# Patient Record
Sex: FEMALE | Race: WHITE | NOT HISPANIC OR LATINO | URBAN - METROPOLITAN AREA
[De-identification: names, ages, dates, MRNs, and addresses within clinical notes are randomized per-mention and may not be internally consistent; named-entity substitution may affect disease eponyms.]

---

## 2017-02-10 ENCOUNTER — TRANSCRIBE ORDERS (OUTPATIENT)
Dept: MRI IMAGING | Facility: CLINIC | Age: 67
End: 2017-02-10

## 2017-02-10 DIAGNOSIS — G35 MULTIPLE SCLEROSIS (HCC): Primary | ICD-10-CM

## 2017-02-21 ENCOUNTER — HOSPITAL ENCOUNTER (OUTPATIENT)
Dept: MRI IMAGING | Facility: CLINIC | Age: 67
Discharge: HOME | End: 2017-02-21
Payer: COMMERCIAL

## 2017-02-21 ENCOUNTER — APPOINTMENT (OUTPATIENT)
Dept: MRI IMAGING | Facility: CLINIC | Age: 67
End: 2017-02-21
Payer: COMMERCIAL

## 2017-02-21 DIAGNOSIS — G35 MULTIPLE SCLEROSIS (HCC): ICD-10-CM

## 2017-02-21 PROCEDURE — A9585 GADOBUTROL INJECTION: HCPCS | Performed by: DENTIST

## 2017-02-21 PROCEDURE — 70553 MRI BRAIN STEM W/O & W/DYE: CPT

## 2017-02-21 PROCEDURE — 72156 MRI NECK SPINE W/O & W/DYE: CPT

## 2017-02-21 RX ADMIN — GADOBUTROL 6 ML: 604.72 INJECTION INTRAVENOUS at 11:02

## 2017-03-20 ENCOUNTER — HOSPITAL ENCOUNTER (OUTPATIENT)
Dept: MRI IMAGING | Facility: CLINIC | Age: 67
Discharge: HOME | End: 2017-03-20
Payer: COMMERCIAL

## 2017-03-20 DIAGNOSIS — G35 MULTIPLE SCLEROSIS (HCC): ICD-10-CM

## 2017-03-20 PROCEDURE — A9585 GADOBUTROL INJECTION: HCPCS | Performed by: DENTIST

## 2017-03-20 PROCEDURE — 72157 MRI CHEST SPINE W/O & W/DYE: CPT

## 2017-03-20 RX ADMIN — GADOBUTROL 6 ML: 604.72 INJECTION INTRAVENOUS at 10:23

## 2018-02-28 LAB — HBA1C MFR BLD HPLC: 7.6 %

## 2019-01-25 ENCOUNTER — OFFICE VISIT (OUTPATIENT)
Dept: INTERNAL MEDICINE CLINIC | Facility: CLINIC | Age: 69
End: 2019-01-25
Payer: COMMERCIAL

## 2019-01-25 VITALS
OXYGEN SATURATION: 98 % | HEIGHT: 60 IN | BODY MASS INDEX: 26.31 KG/M2 | WEIGHT: 134 LBS | SYSTOLIC BLOOD PRESSURE: 178 MMHG | RESPIRATION RATE: 18 BRPM | DIASTOLIC BLOOD PRESSURE: 68 MMHG | HEART RATE: 88 BPM

## 2019-01-25 DIAGNOSIS — M81.0 AGE RELATED OSTEOPOROSIS, UNSPECIFIED PATHOLOGICAL FRACTURE PRESENCE: ICD-10-CM

## 2019-01-25 DIAGNOSIS — G35 MULTIPLE SCLEROSIS (HCC): ICD-10-CM

## 2019-01-25 DIAGNOSIS — E03.9 HYPOTHYROIDISM, ACQUIRED: Primary | ICD-10-CM

## 2019-01-25 DIAGNOSIS — E78.2 MIXED HYPERLIPIDEMIA: ICD-10-CM

## 2019-01-25 DIAGNOSIS — F41.9 ANXIETY: ICD-10-CM

## 2019-01-25 DIAGNOSIS — M65.341 TRIGGER RING FINGER OF RIGHT HAND: ICD-10-CM

## 2019-01-25 DIAGNOSIS — Z23 NEED FOR VACCINATION: ICD-10-CM

## 2019-01-25 DIAGNOSIS — Z11.59 NEED FOR HEPATITIS C SCREENING TEST: ICD-10-CM

## 2019-01-25 DIAGNOSIS — E11.9 TYPE 2 DIABETES MELLITUS WITHOUT COMPLICATION, WITHOUT LONG-TERM CURRENT USE OF INSULIN (HCC): ICD-10-CM

## 2019-01-25 PROCEDURE — 1101F PT FALLS ASSESS-DOCD LE1/YR: CPT | Performed by: INTERNAL MEDICINE

## 2019-01-25 PROCEDURE — 99204 OFFICE O/P NEW MOD 45 MIN: CPT | Performed by: INTERNAL MEDICINE

## 2019-01-25 PROCEDURE — 3725F SCREEN DEPRESSION PERFORMED: CPT | Performed by: INTERNAL MEDICINE

## 2019-01-25 PROCEDURE — G0008 ADMIN INFLUENZA VIRUS VAC: HCPCS | Performed by: INTERNAL MEDICINE

## 2019-01-25 PROCEDURE — 90662 IIV NO PRSV INCREASED AG IM: CPT | Performed by: INTERNAL MEDICINE

## 2019-01-25 RX ORDER — LEVOTHYROXINE SODIUM 0.03 MG/1
25 TABLET ORAL DAILY
Refills: 2 | COMMUNITY
Start: 2019-01-15 | End: 2019-04-15 | Stop reason: SDUPTHER

## 2019-01-25 RX ORDER — BUSPIRONE HYDROCHLORIDE 10 MG/1
10 TABLET ORAL 2 TIMES DAILY PRN
Qty: 60 TABLET | Refills: 3 | Status: SHIPPED | OUTPATIENT
Start: 2019-01-25 | End: 2020-08-31 | Stop reason: SDUPTHER

## 2019-01-25 RX ORDER — IBANDRONATE SODIUM 150 MG/1
TABLET, FILM COATED ORAL
COMMUNITY
Start: 2019-01-25 | End: 2019-03-08 | Stop reason: SDUPTHER

## 2019-01-25 RX ORDER — ROSUVASTATIN CALCIUM 10 MG/1
10 TABLET, COATED ORAL
Refills: 1 | COMMUNITY
Start: 2018-12-08 | End: 2019-07-17 | Stop reason: SDUPTHER

## 2019-01-25 RX ORDER — BUSPIRONE HYDROCHLORIDE 10 MG/1
TABLET ORAL
COMMUNITY
End: 2019-01-25 | Stop reason: SDUPTHER

## 2019-01-25 RX ORDER — METHOCARBAMOL 500 MG/1
500 TABLET, FILM COATED ORAL 3 TIMES DAILY PRN
COMMUNITY
End: 2021-09-22 | Stop reason: ALTCHOICE

## 2019-01-25 NOTE — PROGRESS NOTES
Assessment/Plan:     Chronic problems appear stable at this point  She does need blood work to make sure  Ordered those tests  For her trigger finger, we can refer her to Ortho  Continue follow-up with her other specialists  BMI Counseling: Body mass index is 26 17 kg/m²  Discussed the patient's BMI with her  The BMI is above average  BMI counseling and education was provided to the patient  Nutrition recommendations include moderation in carbohydrate intake  Return in about 2 weeks (around 2/8/2019)  No problem-specific Assessment & Plan notes found for this encounter  Diagnoses and all orders for this visit:    Hypothyroidism, acquired  -     TSH, 3rd generation; Future  -     T4, free; Future    Type 2 diabetes mellitus without complication, without long-term current use of insulin (HCC)  -     CBC and differential; Future  -     Comprehensive metabolic panel; Future  -     Hemoglobin A1C; Future  -     Lipid panel; Future  -     Microalbumin / creatinine urine ratio; Future    Mixed hyperlipidemia    Multiple sclerosis (HCC)    Age related osteoporosis, unspecified pathological fracture presence    Need for hepatitis C screening test  -     Hepatitis C antibody; Future    Trigger ring finger of right hand  -     Ambulatory referral to Orthopedic Surgery; Future    Anxiety  -     busPIRone (BUSPAR) 10 mg tablet; Take 1 tablet (10 mg total) by mouth 2 (two) times a day as needed (anxiety)    Need for vaccination  -     influenza vaccine, 3847-1305, high-dose, PF 0 5 mL, for patients 65 yr+ (FLUZONE HIGH-DOSE)    Other orders  -     metFORMIN (GLUCOPHAGE) 1000 MG tablet;   -     levothyroxine 25 mcg tablet; Take 25 mcg by mouth daily  -     ibandronate (BONIVA) 150 MG tablet;   -     rosuvastatin (CRESTOR) 10 MG tablet; Take 10 mg by mouth daily at bedtime  -     methocarbamol (ROBAXIN) 500 mg tablet;  Take 500 mg by mouth Three times daily as needed  -     Discontinue: busPIRone (BUSPAR) 10 mg tablet; Take by mouth  -     DOCOSAHEXAENOIC ACID PO; Take 1 tablet by mouth          Subjective:      Patient ID: Tiburcio Owens is a 79 y o  female  Patient is a 56-year-old white female who comes in today for 1st time visit  She used to see me several years ago at the old office  Then there were some insurance issues  She does have multiple medical problems which are fairly well controlled at this point  She does have multiple sclerosis but is not on any medicine at this point  She reports she had been on steroids for a long time for this  And because of that, she has osteoporosis and is on medication for that  She is diabetic and recently was started on metformin because diet was no longer controlling it  She brought in some labs which show fairly good control but she is due for repeat labs  She also has hypothyroidism and is on medication for that  She also has high cholesterol which appears to be controlled on the blood work  She is taking the medicines as directed  She also reports history of anxiety for which she will actually only take an occasional BuSpar  That has worked for her for many years but she does need a refill  More recently she has been having worsening pain and discomfort from trigger fingers, 1 in each hand  She tried conservative measures but it is not helping and she would like to see orthopedics at this point to consider a steroid injection          ALLERGIES:  Allergies   Allergen Reactions    Bee Venom Shortness Of Breath and Swelling    Latex Rash       CURRENT MEDICATIONS:    Current Outpatient Prescriptions:     busPIRone (BUSPAR) 10 mg tablet, Take 1 tablet (10 mg total) by mouth 2 (two) times a day as needed (anxiety), Disp: 60 tablet, Rfl: 3    ibandronate (BONIVA) 150 MG tablet, , Disp: , Rfl:     levothyroxine 25 mcg tablet, Take 25 mcg by mouth daily, Disp: , Rfl: 2    metFORMIN (GLUCOPHAGE) 1000 MG tablet, , Disp: , Rfl:     methocarbamol (ROBAXIN) 500 mg tablet, Take 500 mg by mouth Three times daily as needed, Disp: , Rfl:     rosuvastatin (CRESTOR) 10 MG tablet, Take 10 mg by mouth daily at bedtime, Disp: , Rfl: 1    DOCOSAHEXAENOIC ACID PO, Take 1 tablet by mouth, Disp: , Rfl:     ACTIVE PROBLEM LIST:  Patient Active Problem List   Diagnosis    Basal cell carcinoma of back    Multiple sclerosis ([address] )    Hypothyroidism, acquired    Mixed hyperlipidemia    Age related osteoporosis       PAST MEDICAL HISTORY:  No past medical history on file  PAST SURGICAL HISTORY:  Past Surgical History:   Procedure Laterality Date    MYRINGOTOMY W/ TUBES         FAMILY HISTORY:  Family History   Problem Relation Age of Onset    Thyroid cancer Mother     Prostate cancer Father        SOCIAL HISTORY:  Social History     Social History    Marital status: /Civil Union     Spouse name: N/A    Number of children: N/A    Years of education: N/A     Occupational History    Not on file  Social History Main Topics    Smoking status: Never Smoker    Smokeless tobacco: Never Used    Alcohol use Not on file    Drug use: Unknown    Sexual activity: Not on file     Other Topics Concern    Not on file     Social History Narrative    No narrative on file       Review of Systems   Constitutional: Negative for fever  HENT: Negative for congestion and sore throat  Eyes: Negative for visual disturbance  Respiratory: Negative for shortness of breath  Cardiovascular: Negative for chest pain  Gastrointestinal: Negative for abdominal pain  Genitourinary: Negative for difficulty urinating  Musculoskeletal: Negative for back pain  Skin: Negative for rash  Neurological: Negative for headaches  Hematological: Does not bruise/bleed easily  Psychiatric/Behavioral: Negative for dysphoric mood           Objective:  Vitals:    01/25/19 1434   BP: (!) 178/68   BP Location: Left arm   Patient Position: Sitting   Cuff Size: Adult   Pulse: 88   Resp: 18 SpO2: 98%   Weight: 60 8 kg (134 lb)   Height: 5' (1 524 m)     Body mass index is 26 17 kg/m²  Physical Exam   Constitutional: She is oriented to person, place, and time  She appears well-developed and well-nourished  HENT:   Right Ear: External ear normal    Left Ear: External ear normal    Nose: Nose normal    Mouth/Throat: Oropharynx is clear and moist    Eyes: Pupils are equal, round, and reactive to light  Conjunctivae and EOM are normal    Neck: Normal range of motion  Neck supple  Carotid bruit is not present  Cardiovascular: Normal rate, regular rhythm, normal heart sounds and intact distal pulses  Pulmonary/Chest: Effort normal and breath sounds normal    Abdominal: Soft  Bowel sounds are normal    Musculoskeletal: Normal range of motion  She exhibits no edema  Neurological: She is alert and oriented to person, place, and time  She has normal reflexes  Skin: Skin is warm and dry  No rash noted  Psychiatric: She has a normal mood and affect  Nursing note and vitals reviewed  RESULTS:    No results found for this or any previous visit (from the past 1008 hour(s))  This note was created with voice recognition software  Phonic, grammatical and spelling errors may be present within the note as a result

## 2019-01-28 LAB — HBA1C MFR BLD HPLC: 7.1 %

## 2019-02-22 ENCOUNTER — OFFICE VISIT (OUTPATIENT)
Dept: INTERNAL MEDICINE CLINIC | Facility: CLINIC | Age: 69
End: 2019-02-22
Payer: COMMERCIAL

## 2019-02-22 VITALS
HEART RATE: 84 BPM | OXYGEN SATURATION: 97 % | HEIGHT: 60 IN | SYSTOLIC BLOOD PRESSURE: 146 MMHG | BODY MASS INDEX: 26.5 KG/M2 | DIASTOLIC BLOOD PRESSURE: 70 MMHG | RESPIRATION RATE: 18 BRPM | WEIGHT: 135 LBS

## 2019-02-22 DIAGNOSIS — E03.9 HYPOTHYROIDISM, ACQUIRED: ICD-10-CM

## 2019-02-22 DIAGNOSIS — E11.9 TYPE 2 DIABETES MELLITUS WITHOUT COMPLICATION, WITHOUT LONG-TERM CURRENT USE OF INSULIN (HCC): Primary | ICD-10-CM

## 2019-02-22 DIAGNOSIS — Z12.11 SCREENING FOR MALIGNANT NEOPLASM OF COLON: ICD-10-CM

## 2019-02-22 DIAGNOSIS — E78.2 MIXED HYPERLIPIDEMIA: ICD-10-CM

## 2019-02-22 PROCEDURE — 3008F BODY MASS INDEX DOCD: CPT | Performed by: INTERNAL MEDICINE

## 2019-02-22 PROCEDURE — 1160F RVW MEDS BY RX/DR IN RCRD: CPT | Performed by: INTERNAL MEDICINE

## 2019-02-22 PROCEDURE — 99214 OFFICE O/P EST MOD 30 MIN: CPT | Performed by: INTERNAL MEDICINE

## 2019-02-22 PROCEDURE — 1036F TOBACCO NON-USER: CPT | Performed by: INTERNAL MEDICINE

## 2019-02-22 NOTE — PROGRESS NOTES
Assessment/Plan:     Chronic problems are stable  Continue present medications  Continue diet and exercise  Ordered labs for next visit  Return in about 4 months (around 6/22/2019)  No problem-specific Assessment & Plan notes found for this encounter  Diagnoses and all orders for this visit:    Type 2 diabetes mellitus without complication, without long-term current use of insulin (HCC)  -     Comprehensive metabolic panel; Future  -     CBC and differential; Future  -     Hemoglobin A1C; Future  -     Lipid panel; Future  -     Microalbumin / creatinine urine ratio; Future    Hypothyroidism, acquired  -     T4, free; Future  -     TSH, 3rd generation; Future    Mixed hyperlipidemia    Screening for malignant neoplasm of colon  -     Cologuard; Future          Subjective:      Patient ID: Sosa Palmer is a 79 y o  female  Patient comes in today for follow-up  Her blood work shows her sugars are better controlled  Slightly improved from the summer  Cholesterol is controlled  TSH is slightly high but appears better than prior readings recently  She has no changes in her medicines  Reports no new complaints        ALLERGIES:  Allergies   Allergen Reactions    Bee Venom Shortness Of Breath and Swelling    Latex Rash       CURRENT MEDICATIONS:    Current Outpatient Medications:     busPIRone (BUSPAR) 10 mg tablet, Take 1 tablet (10 mg total) by mouth 2 (two) times a day as needed (anxiety), Disp: 60 tablet, Rfl: 3    DOCOSAHEXAENOIC ACID PO, Take 1 tablet by mouth, Disp: , Rfl:     ibandronate (BONIVA) 150 MG tablet, , Disp: , Rfl:     levothyroxine 25 mcg tablet, Take 25 mcg by mouth daily, Disp: , Rfl: 2    metFORMIN (GLUCOPHAGE) 1000 MG tablet, , Disp: , Rfl:     methocarbamol (ROBAXIN) 500 mg tablet, Take 500 mg by mouth Three times daily as needed, Disp: , Rfl:     rosuvastatin (CRESTOR) 10 MG tablet, Take 10 mg by mouth daily at bedtime, Disp: , Rfl: 1    ACTIVE PROBLEM LIST:  Patient Active Problem List   Diagnosis    Basal cell carcinoma of back    Multiple sclerosis ([facility] [address]    Hypothyroidism, acquired    Mixed hyperlipidemia    Age related osteoporosis       PAST MEDICAL HISTORY:  History reviewed  No pertinent past medical history      PAST SURGICAL HISTORY:  Past Surgical History:   Procedure Laterality Date    MYRINGOTOMY W/ TUBES         FAMILY HISTORY:  Family History   Problem Relation Age of Onset    Thyroid cancer Mother     Prostate cancer Father        SOCIAL HISTORY:  Social History     Socioeconomic History    Marital status: /Civil Union     Spouse name: Not on file    Number of children: Not on file    Years of education: Not on file    Highest education level: Not on file   Occupational History    Not on file   Social Needs    Financial resource strain: Not on file    Food insecurity:     Worry: Not on file     Inability: Not on file    Transportation needs:     Medical: Not on file     Non-medical: Not on file   Tobacco Use    Smoking status: Never Smoker    Smokeless tobacco: Never Used   Substance and Sexual Activity    Alcohol use: Not on file    Drug use: Not on file    Sexual activity: Not on file   Lifestyle    Physical activity:     Days per week: Not on file     Minutes per session: Not on file    Stress: Not on file   Relationships    Social connections:     Talks on phone: Not on file     Gets together: Not on file     Attends Restoration service: Not on file     Active member of club or organization: Not on file     Attends meetings of clubs or organizations: Not on file     Relationship status: Not on file    Intimate partner violence:     Fear of current or ex partner: Not on file     Emotionally abused: Not on file     Physically abused: Not on file     Forced sexual activity: Not on file   Other Topics Concern    Not on file   Social History Narrative    Not on file       Review of Systems   Respiratory: Negative for shortness of breath  Cardiovascular: Negative for chest pain  Gastrointestinal: Negative for abdominal pain  Objective:  Vitals:    02/22/19 1606   BP: 146/70   BP Location: Left arm   Patient Position: Sitting   Cuff Size: Adult   Pulse: 84   Resp: 18   SpO2: 97%   Weight: 61 2 kg (135 lb)   Height: 5' (1 524 m)     Body mass index is 26 37 kg/m²  Physical Exam   Constitutional: She is oriented to person, place, and time  She appears well-developed and well-nourished  Cardiovascular: Normal rate, regular rhythm and normal heart sounds  Pulmonary/Chest: Effort normal and breath sounds normal    Abdominal: Soft  There is no tenderness  Neurological: She is alert and oriented to person, place, and time  Nursing note and vitals reviewed  RESULTS:    Recent Results (from the past 1008 hour(s))   Hemoglobin A1C    Collection Time: 01/28/19 12:00 AM   Result Value Ref Range    Hemoglobin A1C 7 1        This note was created with voice recognition software  Phonic, grammatical and spelling errors may be present within the note as a result

## 2019-02-25 DIAGNOSIS — E11.9 TYPE 2 DIABETES MELLITUS WITHOUT COMPLICATION, WITHOUT LONG-TERM CURRENT USE OF INSULIN (HCC): Primary | ICD-10-CM

## 2019-02-25 NOTE — TELEPHONE ENCOUNTER
I can but screening for Vitamin D is not covered by Medicare and test is expensive  This is why we just tell people to take 2000 units of Vitamin D3 OTC daily

## 2019-03-08 ENCOUNTER — TELEPHONE (OUTPATIENT)
Dept: INTERNAL MEDICINE CLINIC | Facility: CLINIC | Age: 69
End: 2019-03-08

## 2019-03-08 DIAGNOSIS — M81.0 AGE-RELATED OSTEOPOROSIS WITHOUT CURRENT PATHOLOGICAL FRACTURE: Primary | ICD-10-CM

## 2019-03-08 RX ORDER — IBANDRONATE SODIUM 150 MG/1
150 TABLET, FILM COATED ORAL
Qty: 4 TABLET | Refills: 3 | Status: SHIPPED | OUTPATIENT
Start: 2019-03-08 | End: 2020-01-20 | Stop reason: SDUPTHER

## 2019-03-08 NOTE — TELEPHONE ENCOUNTER
Ibandronate (BONIVA) 150 MG tablet    Per note from Dr Heather Walker this Medication has to be filled by patient's PCP or Rheumatologist     Please advise    Andrew Luz

## 2019-04-03 ENCOUNTER — TELEPHONE (OUTPATIENT)
Dept: INTERNAL MEDICINE CLINIC | Facility: CLINIC | Age: 69
End: 2019-04-03

## 2019-04-15 DIAGNOSIS — E03.9 HYPOTHYROIDISM, ACQUIRED: Primary | ICD-10-CM

## 2019-04-15 RX ORDER — LEVOTHYROXINE SODIUM 0.03 MG/1
25 TABLET ORAL DAILY
Qty: 30 TABLET | Refills: 5 | Status: SHIPPED | OUTPATIENT
Start: 2019-04-15 | End: 2019-10-03 | Stop reason: SDUPTHER

## 2019-04-22 DIAGNOSIS — E55.9 VITAMIN D DEFICIENCY: Primary | ICD-10-CM

## 2019-04-30 ENCOUNTER — TELEPHONE (OUTPATIENT)
Dept: INTERNAL MEDICINE CLINIC | Facility: CLINIC | Age: 69
End: 2019-04-30

## 2019-06-19 LAB
CREAT ?TM UR-SCNC: 59.1 UMOL/L
EXT MICROALBUMIN URINE RANDOM: 2.2
HBA1C MFR BLD HPLC: 7.3 %
MICROALBUMIN/CREAT UR: 37.2 MG/G{CREAT}

## 2019-06-19 PROCEDURE — 3060F POS MICROALBUMINURIA REV: CPT | Performed by: INTERNAL MEDICINE

## 2019-06-25 ENCOUNTER — TRANSCRIBE ORDERS (OUTPATIENT)
Dept: ADMINISTRATIVE | Facility: HOSPITAL | Age: 69
End: 2019-06-25

## 2019-06-25 DIAGNOSIS — G35 MS (MULTIPLE SCLEROSIS) (HCC): Primary | ICD-10-CM

## 2019-07-01 ENCOUNTER — OFFICE VISIT (OUTPATIENT)
Dept: INTERNAL MEDICINE CLINIC | Facility: CLINIC | Age: 69
End: 2019-07-01
Payer: COMMERCIAL

## 2019-07-01 VITALS
OXYGEN SATURATION: 98 % | BODY MASS INDEX: 28.07 KG/M2 | HEIGHT: 60 IN | WEIGHT: 143 LBS | RESPIRATION RATE: 16 BRPM | SYSTOLIC BLOOD PRESSURE: 158 MMHG | HEART RATE: 87 BPM | DIASTOLIC BLOOD PRESSURE: 80 MMHG

## 2019-07-01 DIAGNOSIS — E78.2 MIXED HYPERLIPIDEMIA: ICD-10-CM

## 2019-07-01 DIAGNOSIS — E11.9 TYPE 2 DIABETES MELLITUS WITHOUT COMPLICATION, WITHOUT LONG-TERM CURRENT USE OF INSULIN (HCC): Primary | ICD-10-CM

## 2019-07-01 DIAGNOSIS — Z23 NEED FOR PNEUMOCOCCAL VACCINATION: ICD-10-CM

## 2019-07-01 DIAGNOSIS — E03.9 HYPOTHYROIDISM, ACQUIRED: ICD-10-CM

## 2019-07-01 PROCEDURE — 4040F PNEUMOC VAC/ADMIN/RCVD: CPT | Performed by: INTERNAL MEDICINE

## 2019-07-01 PROCEDURE — 3008F BODY MASS INDEX DOCD: CPT | Performed by: INTERNAL MEDICINE

## 2019-07-01 PROCEDURE — 1036F TOBACCO NON-USER: CPT | Performed by: INTERNAL MEDICINE

## 2019-07-01 PROCEDURE — 90732 PPSV23 VACC 2 YRS+ SUBQ/IM: CPT | Performed by: INTERNAL MEDICINE

## 2019-07-01 PROCEDURE — G0009 ADMIN PNEUMOCOCCAL VACCINE: HCPCS | Performed by: INTERNAL MEDICINE

## 2019-07-01 PROCEDURE — 1160F RVW MEDS BY RX/DR IN RCRD: CPT | Performed by: INTERNAL MEDICINE

## 2019-07-01 PROCEDURE — 99214 OFFICE O/P EST MOD 30 MIN: CPT | Performed by: INTERNAL MEDICINE

## 2019-07-01 NOTE — PROGRESS NOTES
Assessment/Plan:     Chronic problems are stable  Suggested she start taking her cholesterol medicine in the morning because she keeps forgetting it  Reinforced diet and exercise because of the increase in sugars  No medication changes otherwise  Ordered blood work for next visit  BMI Counseling: Body mass index is 27 93 kg/m²  Discussed the patient's BMI with her  The BMI is above average  BMI counseling and education was provided to the patient  Nutrition recommendations include moderation in carbohydrate intake  Return in about 4 months (around 11/1/2019) for Regular visit and Medicare Wellness  No problem-specific Assessment & Plan notes found for this encounter  Diagnoses and all orders for this visit:    Type 2 diabetes mellitus without complication, without long-term current use of insulin (HCC)  -     Comprehensive metabolic panel; Future  -     CBC and differential; Future  -     Hemoglobin A1C; Future  -     Lipid panel; Future    Hypothyroidism, acquired  -     T4, free; Future  -     TSH, 3rd generation; Future    Mixed hyperlipidemia    Need for pneumococcal vaccination  -     PNEUMOCOCCAL POLYSACCHARIDE VACCINE 23-VALENT =>3YO SQ IM          Subjective:      Patient ID: Ned Mckenzie is a 79 y o  female  Patient comes in today for routine follow-up  Her blood work shows her sugars have gotten a little worse  She has been taking her medicine but admits she could do better with her diet  Thyroid levels are controlled  Blood pressure is controlled  Cholesterol was also markedly elevated  She admits she frequently forgets to take the medicine at night  Denies any new complaints today  No further additions to her history        ALLERGIES:  Allergies   Allergen Reactions    Bee Venom Shortness Of Breath and Swelling    Latex Rash       CURRENT MEDICATIONS:    Current Outpatient Medications:     busPIRone (BUSPAR) 10 mg tablet, Take 1 tablet (10 mg total) by mouth 2 (two) times a day as needed (anxiety), Disp: 60 tablet, Rfl: 3    DOCOSAHEXAENOIC ACID PO, Take 1 tablet by mouth, Disp: , Rfl:     ibandronate (BONIVA) 150 MG tablet, Take 1 tablet (150 mg total) by mouth every 30 (thirty) days, Disp: 4 tablet, Rfl: 3    levothyroxine 25 mcg tablet, Take 1 tablet (25 mcg total) by mouth daily, Disp: 30 tablet, Rfl: 5    metFORMIN (GLUCOPHAGE) 1000 MG tablet, Take 1 tablet (1,000 mg total) by mouth daily with breakfast, Disp: 30 tablet, Rfl: 5    methocarbamol (ROBAXIN) 500 mg tablet, Take 500 mg by mouth Three times daily as needed, Disp: , Rfl:     rosuvastatin (CRESTOR) 10 MG tablet, Take 10 mg by mouth daily at bedtime, Disp: , Rfl: 1    ACTIVE PROBLEM LIST:  Patient Active Problem List   Diagnosis    Basal cell carcinoma of back    Multiple sclerosis ([facility] [address]    Hypothyroidism, acquired    Mixed hyperlipidemia    Age related osteoporosis    Type 2 diabetes mellitus without complication, without long-term current use of insulin (HCC)       PAST MEDICAL HISTORY:  History reviewed  No pertinent past medical history      PAST SURGICAL HISTORY:  Past Surgical History:   Procedure Laterality Date    MYRINGOTOMY W/ TUBES         FAMILY HISTORY:  Family History   Problem Relation Age of Onset    Thyroid cancer Mother     Prostate cancer Father        SOCIAL HISTORY:  Social History     Socioeconomic History    Marital status: /Civil Union     Spouse name: Not on file    Number of children: Not on file    Years of education: Not on file    Highest education level: Not on file   Occupational History    Not on file   Social Needs    Financial resource strain: Not on file    Food insecurity:     Worry: Not on file     Inability: Not on file    Transportation needs:     Medical: Not on file     Non-medical: Not on file   Tobacco Use    Smoking status: Never Smoker    Smokeless tobacco: Never Used   Substance and Sexual Activity    Alcohol use: Not on file    Drug use: Not on file    Sexual activity: Not on file   Lifestyle    Physical activity:     Days per week: Not on file     Minutes per session: Not on file    Stress: Not on file   Relationships    Social connections:     Talks on phone: Not on file     Gets together: Not on file     Attends Yarsanism service: Not on file     Active member of club or organization: Not on file     Attends meetings of clubs or organizations: Not on file     Relationship status: Not on file    Intimate partner violence:     Fear of current or ex partner: Not on file     Emotionally abused: Not on file     Physically abused: Not on file     Forced sexual activity: Not on file   Other Topics Concern    Not on file   Social History Narrative    Not on file       Review of Systems   Respiratory: Negative for shortness of breath  Cardiovascular: Negative for chest pain  Gastrointestinal: Negative for abdominal pain  Objective:  Vitals:    07/01/19 1538   BP: 158/80   BP Location: Left arm   Patient Position: Sitting   Cuff Size: Standard   Pulse: 87   Resp: 16   SpO2: 98%   Weight: 64 9 kg (143 lb)   Height: 5' (1 524 m)     Body mass index is 27 93 kg/m²  Physical Exam   Constitutional: She is oriented to person, place, and time  She appears well-developed and well-nourished  Cardiovascular: Normal rate, regular rhythm and normal heart sounds  Pulmonary/Chest: Effort normal and breath sounds normal    Abdominal: Soft  There is no tenderness  Neurological: She is alert and oriented to person, place, and time  Nursing note and vitals reviewed  RESULTS:    Recent Results (from the past 1008 hour(s))   Hemoglobin A1C    Collection Time: 06/19/19 12:00 AM   Result Value Ref Range    Hemoglobin A1C 7 3    Microalbumin / creatinine urine ratio    Collection Time: 06/19/19 12:00 AM   Result Value Ref Range    EXT Creatinine Urine 59 1     Microalbum  ,U,Random 2 2     EXTERNAL Microalb/Creat Ratio 37 2        This note was created with voice recognition software  Phonic, grammatical and spelling errors may be present within the note as a result

## 2019-07-01 NOTE — PROGRESS NOTES
Patient's shoes and socks removed  Right Foot/Ankle   Right Foot Inspection  Skin Exam: skin normal and skin intact no dry skin, no warmth, no callus, no erythema, no maceration, no abnormal color, no pre-ulcer, no ulcer and no callus                          Toe Exam: ROM and strength within normal limits  Sensory   Vibration: intact  Proprioception: intact   Monofilament testing: intact  Vascular    The right DP pulse is 2+  Right Toe  - Comprehensive Exam  Ecchymosis: none  Arch: normal  Swelling: none   Tenderness: none         Left Foot/Ankle  Left Foot Inspection  Skin Exam: skin normal and skin intactno dry skin, no warmth, no erythema, no maceration, normal color, no pre-ulcer, no ulcer and no callus                         Toe Exam: ROM and strength within normal limits                   Sensory   Vibration: intact  Proprioception: intact  Monofilament: intact  Vascular    The left DP pulse is 2+  Left Toe  - Comprehensive Exam  Ecchymosis: none  Arch: normal  Swelling: none   Tenderness: none       Assign Risk Category:  No deformity present; No loss of protective sensation;  No weak pulses       Risk: 0

## 2019-07-17 DIAGNOSIS — E78.2 MIXED HYPERLIPIDEMIA: Primary | ICD-10-CM

## 2019-07-17 RX ORDER — ROSUVASTATIN CALCIUM 10 MG/1
10 TABLET, COATED ORAL
Qty: 30 TABLET | Refills: 5 | Status: SHIPPED | OUTPATIENT
Start: 2019-07-17 | End: 2020-01-20 | Stop reason: SDUPTHER

## 2019-08-19 DIAGNOSIS — E11.9 TYPE 2 DIABETES MELLITUS WITHOUT COMPLICATION, WITHOUT LONG-TERM CURRENT USE OF INSULIN (HCC): ICD-10-CM

## 2019-08-26 ENCOUNTER — APPOINTMENT (OUTPATIENT)
Dept: LAB | Facility: CLINIC | Age: 69
End: 2019-08-26
Payer: COMMERCIAL

## 2019-08-26 ENCOUNTER — TRANSCRIBE ORDERS (OUTPATIENT)
Dept: ADMINISTRATIVE | Facility: HOSPITAL | Age: 69
End: 2019-08-26

## 2019-08-26 DIAGNOSIS — G35 MULTIPLE SCLEROSIS (HCC): Primary | ICD-10-CM

## 2019-08-26 DIAGNOSIS — G35 MULTIPLE SCLEROSIS (HCC): ICD-10-CM

## 2019-08-26 LAB
BUN SERPL-MCNC: 13 MG/DL (ref 5–25)
CREAT SERPL-MCNC: 0.76 MG/DL (ref 0.6–1.3)
GFR SERPL CREATININE-BSD FRML MDRD: 82 ML/MIN/1.73SQ M

## 2019-08-26 PROCEDURE — 36415 COLL VENOUS BLD VENIPUNCTURE: CPT

## 2019-08-26 PROCEDURE — 82565 ASSAY OF CREATININE: CPT

## 2019-08-26 PROCEDURE — 84520 ASSAY OF UREA NITROGEN: CPT

## 2019-09-11 ENCOUNTER — HOSPITAL ENCOUNTER (OUTPATIENT)
Dept: MRI IMAGING | Facility: CLINIC | Age: 69
Discharge: HOME | End: 2019-09-11
Payer: COMMERCIAL

## 2019-09-11 ENCOUNTER — APPOINTMENT (OUTPATIENT)
Dept: MRI IMAGING | Facility: CLINIC | Age: 69
End: 2019-09-11
Payer: COMMERCIAL

## 2019-09-11 DIAGNOSIS — G35 MS (MULTIPLE SCLEROSIS) (HCC): ICD-10-CM

## 2019-09-11 PROCEDURE — 70553 MRI BRAIN STEM W/O & W/DYE: CPT

## 2019-09-11 PROCEDURE — A9585 GADOBUTROL INJECTION: HCPCS | Performed by: PSYCHIATRY & NEUROLOGY

## 2019-09-11 RX ADMIN — GADOBUTROL 7 ML: 604.72 INJECTION INTRAVENOUS at 10:32

## 2019-10-03 DIAGNOSIS — E03.9 HYPOTHYROIDISM, ACQUIRED: ICD-10-CM

## 2019-10-03 RX ORDER — LEVOTHYROXINE SODIUM 0.03 MG/1
TABLET ORAL
Qty: 30 TABLET | Refills: 2 | Status: SHIPPED | OUTPATIENT
Start: 2019-10-03 | End: 2019-12-28 | Stop reason: SDUPTHER

## 2019-10-15 ENCOUNTER — OFFICE VISIT (OUTPATIENT)
Dept: INTERNAL MEDICINE CLINIC | Facility: CLINIC | Age: 69
End: 2019-10-15
Payer: COMMERCIAL

## 2019-10-15 VITALS
SYSTOLIC BLOOD PRESSURE: 136 MMHG | DIASTOLIC BLOOD PRESSURE: 72 MMHG | HEIGHT: 60 IN | WEIGHT: 243 LBS | BODY MASS INDEX: 47.71 KG/M2 | OXYGEN SATURATION: 98 % | HEART RATE: 79 BPM

## 2019-10-15 DIAGNOSIS — H66.3X3 CHRONIC SUPPURATIVE OTITIS MEDIA OF BOTH EARS, UNSPECIFIED OTITIS MEDIA LOCATION: ICD-10-CM

## 2019-10-15 DIAGNOSIS — E11.9 TYPE 2 DIABETES MELLITUS WITHOUT COMPLICATION, WITHOUT LONG-TERM CURRENT USE OF INSULIN (HCC): ICD-10-CM

## 2019-10-15 DIAGNOSIS — Z01.818 PRE-OP EXAMINATION: Primary | ICD-10-CM

## 2019-10-15 PROCEDURE — 3008F BODY MASS INDEX DOCD: CPT | Performed by: INTERNAL MEDICINE

## 2019-10-15 PROCEDURE — 99214 OFFICE O/P EST MOD 30 MIN: CPT | Performed by: INTERNAL MEDICINE

## 2019-10-15 PROCEDURE — 93000 ELECTROCARDIOGRAM COMPLETE: CPT | Performed by: INTERNAL MEDICINE

## 2019-10-15 PROCEDURE — 1101F PT FALLS ASSESS-DOCD LE1/YR: CPT | Performed by: INTERNAL MEDICINE

## 2019-10-15 RX ORDER — FLUTICASONE PROPIONATE 50 MCG
SPRAY, SUSPENSION (ML) NASAL
COMMUNITY
Start: 2019-10-08

## 2019-10-15 RX ORDER — MELATONIN
1000 DAILY
COMMUNITY

## 2019-10-15 RX ORDER — MELOXICAM 5 MG/1
5 CAPSULE ORAL
COMMUNITY
End: 2021-05-20 | Stop reason: SDUPTHER

## 2019-10-15 NOTE — PROGRESS NOTES
Assessment/Plan:     No significant abnormalities noted  Patient is considered cleared for the planned procedure with no further workup indicated  Her EKG was within acceptable limits  She will take her medications as per anesthesia protocol  Encouraged her to maintain tight control of her sugars perioperatively to reduce the risk of infection  Quality Measures:       Return for Next scheduled follow up, please add wellness to that appt  No problem-specific Assessment & Plan notes found for this encounter  Diagnoses and all orders for this visit:    Pre-op examination  -     POCT ECG    Chronic suppurative otitis media of both ears, unspecified otitis media location    Type 2 diabetes mellitus without complication, without long-term current use of insulin ([facility])  -     Cancel: POCT hemoglobin A1c    Other orders  -     fluticasone (FLONASE) 50 mcg/act nasal spray  -     Meloxicam 5 MG CAPS; Take 5 mg by mouth  -     cholecalciferol (VITAMIN D3) 1,000 units tablet; Take 1,000 Units by mouth daily          Subjective:      Patient ID: Brianna Rdz is a 79 y o  female  Patient comes in today for preop evaluation for placement of myringotomy tubes for recurrence chronic otitis media  She reports she noticed ear symptoms again and decreased hearing that just would not improve  Her chronic medical problems are stable  Sugars have been under control  She has no history of heart disease  No lung problems        ALLERGIES:  Allergies   Allergen Reactions    Bee Venom Shortness Of Breath and Swelling    Latex Rash       CURRENT MEDICATIONS:    Current Outpatient Medications:     busPIRone (BUSPAR) 10 mg tablet, Take 1 tablet (10 mg total) by mouth 2 (two) times a day as needed (anxiety), Disp: 60 tablet, Rfl: 3    cholecalciferol (VITAMIN D3) 1,000 units tablet, Take 1,000 Units by mouth daily, Disp: , Rfl:     fluticasone (FLONASE) 50 mcg/act nasal spray, , Disp: , Rfl:     ibandronate (BONIVA) 150 MG tablet, Take 1 tablet (150 mg total) by mouth every 30 (thirty) days, Disp: 4 tablet, Rfl: 3    levothyroxine 25 mcg tablet, TAKE 1 TABLET BY MOUTH EVERY DAY, Disp: 30 tablet, Rfl: 2    Meloxicam 5 MG CAPS, Take 5 mg by mouth, Disp: , Rfl:     metFORMIN (GLUCOPHAGE) 1000 MG tablet, TAKE 1 TABLET BY MOUTH EVERY DAY WITH BREAKFAST, Disp: 30 tablet, Rfl: 5    rosuvastatin (CRESTOR) 10 MG tablet, Take 1 tablet (10 mg total) by mouth daily at bedtime, Disp: 30 tablet, Rfl: 5    DOCOSAHEXAENOIC ACID PO, Take 1 tablet by mouth, Disp: , Rfl:     methocarbamol (ROBAXIN) 500 mg tablet, Take 500 mg by mouth Three times daily as needed, Disp: , Rfl:     ACTIVE PROBLEM LIST:  Patient Active Problem List   Diagnosis    Basal cell carcinoma of back    Multiple sclerosis (HCC)    Hypothyroidism, acquired    Mixed hyperlipidemia    Age related osteoporosis    Type 2 diabetes mellitus without complication, without long-term current use of insulin (HCC)       PAST MEDICAL HISTORY:  No past medical history on file      PAST SURGICAL HISTORY:  Past Surgical History:   Procedure Laterality Date    MYRINGOTOMY W/ TUBES         FAMILY HISTORY:  Family History   Problem Relation Age of Onset    Thyroid cancer Mother     Prostate cancer Father        SOCIAL HISTORY:  Social History     Socioeconomic History    Marital status: /Civil Union     Spouse name: Not on file    Number of children: Not on file    Years of education: Not on file    Highest education level: Not on file   Occupational History    Not on file   Social Needs    Financial resource strain: Not on file    Food insecurity:     Worry: Not on file     Inability: Not on file    Transportation needs:     Medical: Not on file     Non-medical: Not on file   Tobacco Use    Smoking status: Never Smoker    Smokeless tobacco: Never Used   Substance and Sexual Activity    Alcohol use: Not on file    Drug use: Not on file    Sexual activity: Not on file   Lifestyle    Physical activity:     Days per week: Not on file     Minutes per session: Not on file    Stress: Not on file   Relationships    Social connections:     Talks on phone: Not on file     Gets together: Not on file     Attends Caodaism service: Not on file     Active member of club or organization: Not on file     Attends meetings of clubs or organizations: Not on file     Relationship status: Not on file    Intimate partner violence:     Fear of current or ex partner: Not on file     Emotionally abused: Not on file     Physically abused: Not on file     Forced sexual activity: Not on file   Other Topics Concern    Not on file   Social History Narrative    Not on file       Review of Systems   Constitutional: Negative for fever  Respiratory: Negative for cough, shortness of breath and wheezing  Cardiovascular: Negative for chest pain and palpitations  Gastrointestinal: Negative for abdominal pain  Skin: Negative for rash  Hematological: Does not bruise/bleed easily  Objective:  Vitals:    10/15/19 0927   BP: 136/72   BP Location: Left arm   Patient Position: Sitting   Cuff Size: Adult   Pulse: 79   SpO2: 98%   Weight: 110 kg (243 lb)   Height: 5' (1 524 m)     Body mass index is 47 46 kg/m²  Physical Exam   Constitutional: She is oriented to person, place, and time  She appears well-developed and well-nourished  HENT:   Right Ear: External ear normal    Left Ear: External ear normal    Nose: Nose normal    Mouth/Throat: Oropharynx is clear and moist    Eyes: Pupils are equal, round, and reactive to light  Conjunctivae and EOM are normal    Neck: Neck supple  Carotid bruit is not present  Cardiovascular: Normal rate, regular rhythm and normal heart sounds  Pulmonary/Chest: Effort normal and breath sounds normal    Abdominal: Soft  Bowel sounds are normal    Musculoskeletal: She exhibits no edema  Neurological: She is alert and oriented to person, place, and time  Skin: No rash noted  Nursing note and vitals reviewed  RESULTS:    No results found for this or any previous visit (from the past 1008 hour(s))  This note was created with voice recognition software  Phonic, grammatical and spelling errors may be present within the note as a result

## 2019-10-30 ENCOUNTER — APPOINTMENT (OUTPATIENT)
Dept: LAB | Facility: CLINIC | Age: 69
End: 2019-10-30
Payer: COMMERCIAL

## 2019-10-30 DIAGNOSIS — E11.9 TYPE 2 DIABETES MELLITUS WITHOUT COMPLICATION, WITHOUT LONG-TERM CURRENT USE OF INSULIN (HCC): ICD-10-CM

## 2019-10-30 DIAGNOSIS — E03.9 HYPOTHYROIDISM, ACQUIRED: ICD-10-CM

## 2019-10-30 DIAGNOSIS — E55.9 VITAMIN D DEFICIENCY: ICD-10-CM

## 2019-10-30 LAB
25(OH)D3 SERPL-MCNC: 35.5 NG/ML (ref 30–100)
ALBUMIN SERPL BCP-MCNC: 4.7 G/DL (ref 3.5–5)
ALP SERPL-CCNC: 90 U/L (ref 46–116)
ALT SERPL W P-5'-P-CCNC: 50 U/L (ref 12–78)
ANION GAP SERPL CALCULATED.3IONS-SCNC: 6 MMOL/L (ref 4–13)
AST SERPL W P-5'-P-CCNC: 24 U/L (ref 5–45)
BASOPHILS # BLD AUTO: 0.04 THOUSANDS/ΜL (ref 0–0.1)
BASOPHILS NFR BLD AUTO: 1 % (ref 0–1)
BILIRUB SERPL-MCNC: 0.42 MG/DL (ref 0.2–1)
BUN SERPL-MCNC: 13 MG/DL (ref 5–25)
CALCIUM SERPL-MCNC: 9.7 MG/DL (ref 8.3–10.1)
CHLORIDE SERPL-SCNC: 105 MMOL/L (ref 100–108)
CHOLEST SERPL-MCNC: 154 MG/DL (ref 50–200)
CO2 SERPL-SCNC: 27 MMOL/L (ref 21–32)
CREAT SERPL-MCNC: 0.77 MG/DL (ref 0.6–1.3)
CREAT UR-MCNC: 227 MG/DL
EOSINOPHIL # BLD AUTO: 0.14 THOUSAND/ΜL (ref 0–0.61)
EOSINOPHIL NFR BLD AUTO: 2 % (ref 0–6)
ERYTHROCYTE [DISTWIDTH] IN BLOOD BY AUTOMATED COUNT: 13.7 % (ref 11.6–15.1)
EST. AVERAGE GLUCOSE BLD GHB EST-MCNC: 174 MG/DL
GFR SERPL CREATININE-BSD FRML MDRD: 81 ML/MIN/1.73SQ M
GLUCOSE P FAST SERPL-MCNC: 175 MG/DL (ref 65–99)
HBA1C MFR BLD: 7.7 % (ref 4.2–6.3)
HCT VFR BLD AUTO: 41.6 % (ref 34.8–46.1)
HDLC SERPL-MCNC: 41 MG/DL
HGB BLD-MCNC: 12.9 G/DL (ref 11.5–15.4)
IMM GRANULOCYTES # BLD AUTO: 0.02 THOUSAND/UL (ref 0–0.2)
IMM GRANULOCYTES NFR BLD AUTO: 0 % (ref 0–2)
LDLC SERPL CALC-MCNC: 76 MG/DL (ref 0–100)
LYMPHOCYTES # BLD AUTO: 2.07 THOUSANDS/ΜL (ref 0.6–4.47)
LYMPHOCYTES NFR BLD AUTO: 31 % (ref 14–44)
MCH RBC QN AUTO: 27.9 PG (ref 26.8–34.3)
MCHC RBC AUTO-ENTMCNC: 31 G/DL (ref 31.4–37.4)
MCV RBC AUTO: 90 FL (ref 82–98)
MICROALBUMIN UR-MCNC: 82.5 MG/L (ref 0–20)
MICROALBUMIN/CREAT 24H UR: 36 MG/G CREATININE (ref 0–30)
MONOCYTES # BLD AUTO: 0.54 THOUSAND/ΜL (ref 0.17–1.22)
MONOCYTES NFR BLD AUTO: 8 % (ref 4–12)
NEUTROPHILS # BLD AUTO: 3.94 THOUSANDS/ΜL (ref 1.85–7.62)
NEUTS SEG NFR BLD AUTO: 58 % (ref 43–75)
NONHDLC SERPL-MCNC: 113 MG/DL
NRBC BLD AUTO-RTO: 0 /100 WBCS
PLATELET # BLD AUTO: 252 THOUSANDS/UL (ref 149–390)
PMV BLD AUTO: 11.6 FL (ref 8.9–12.7)
POTASSIUM SERPL-SCNC: 3.9 MMOL/L (ref 3.5–5.3)
PROT SERPL-MCNC: 8.2 G/DL (ref 6.4–8.2)
RBC # BLD AUTO: 4.63 MILLION/UL (ref 3.81–5.12)
SODIUM SERPL-SCNC: 138 MMOL/L (ref 136–145)
T4 FREE SERPL-MCNC: 1.02 NG/DL (ref 0.76–1.46)
TRIGL SERPL-MCNC: 183 MG/DL
TSH SERPL DL<=0.05 MIU/L-ACNC: 2.6 UIU/ML (ref 0.36–3.74)
WBC # BLD AUTO: 6.75 THOUSAND/UL (ref 4.31–10.16)

## 2019-10-30 PROCEDURE — 36415 COLL VENOUS BLD VENIPUNCTURE: CPT

## 2019-10-30 PROCEDURE — 82043 UR ALBUMIN QUANTITATIVE: CPT

## 2019-10-30 PROCEDURE — 85025 COMPLETE CBC W/AUTO DIFF WBC: CPT

## 2019-10-30 PROCEDURE — 84443 ASSAY THYROID STIM HORMONE: CPT

## 2019-10-30 PROCEDURE — 80061 LIPID PANEL: CPT

## 2019-10-30 PROCEDURE — 82306 VITAMIN D 25 HYDROXY: CPT

## 2019-10-30 PROCEDURE — 83036 HEMOGLOBIN GLYCOSYLATED A1C: CPT

## 2019-10-30 PROCEDURE — 80053 COMPREHEN METABOLIC PANEL: CPT

## 2019-10-30 PROCEDURE — 84439 ASSAY OF FREE THYROXINE: CPT

## 2019-10-30 PROCEDURE — 82570 ASSAY OF URINE CREATININE: CPT

## 2019-11-13 ENCOUNTER — OFFICE VISIT (OUTPATIENT)
Dept: INTERNAL MEDICINE CLINIC | Facility: CLINIC | Age: 69
End: 2019-11-13
Payer: COMMERCIAL

## 2019-11-13 VITALS
RESPIRATION RATE: 16 BRPM | WEIGHT: 142 LBS | OXYGEN SATURATION: 99 % | SYSTOLIC BLOOD PRESSURE: 128 MMHG | DIASTOLIC BLOOD PRESSURE: 80 MMHG | HEART RATE: 100 BPM | HEIGHT: 60 IN | BODY MASS INDEX: 27.88 KG/M2

## 2019-11-13 DIAGNOSIS — E03.9 HYPOTHYROIDISM, ACQUIRED: ICD-10-CM

## 2019-11-13 DIAGNOSIS — Z00.00 MEDICARE ANNUAL WELLNESS VISIT, INITIAL: Primary | ICD-10-CM

## 2019-11-13 DIAGNOSIS — Z23 NEED FOR INFLUENZA VACCINATION: ICD-10-CM

## 2019-11-13 DIAGNOSIS — E11.9 TYPE 2 DIABETES MELLITUS WITHOUT COMPLICATION, WITHOUT LONG-TERM CURRENT USE OF INSULIN (HCC): ICD-10-CM

## 2019-11-13 DIAGNOSIS — Z12.39 SCREENING FOR BREAST CANCER: ICD-10-CM

## 2019-11-13 DIAGNOSIS — E78.2 MIXED HYPERLIPIDEMIA: ICD-10-CM

## 2019-11-13 PROCEDURE — G0008 ADMIN INFLUENZA VIRUS VAC: HCPCS | Performed by: INTERNAL MEDICINE

## 2019-11-13 PROCEDURE — 99214 OFFICE O/P EST MOD 30 MIN: CPT | Performed by: INTERNAL MEDICINE

## 2019-11-13 PROCEDURE — 90662 IIV NO PRSV INCREASED AG IM: CPT | Performed by: INTERNAL MEDICINE

## 2019-11-13 PROCEDURE — 1160F RVW MEDS BY RX/DR IN RCRD: CPT | Performed by: INTERNAL MEDICINE

## 2019-11-13 PROCEDURE — 3725F SCREEN DEPRESSION PERFORMED: CPT | Performed by: INTERNAL MEDICINE

## 2019-11-13 PROCEDURE — 1036F TOBACCO NON-USER: CPT | Performed by: INTERNAL MEDICINE

## 2019-11-13 PROCEDURE — G0438 PPPS, INITIAL VISIT: HCPCS | Performed by: INTERNAL MEDICINE

## 2019-11-13 NOTE — PROGRESS NOTES
Assessment/Plan:     Because of the increasing A1c, will double her metformin  Strongly encouraged diet and exercise  Continue other medications  Ordered labs for next visit  Quality Measures:       Return in about 4 months (around 3/13/2020)  No problem-specific Assessment & Plan notes found for this encounter  Diagnoses and all orders for this visit:    Medicare annual wellness visit, initial    Type 2 diabetes mellitus without complication, without long-term current use of insulin ([address] )  -     metFORMIN (GLUCOPHAGE) 1000 MG tablet; Take 1 tablet (1,000 mg total) by mouth 2 (two) times a day with meals  -     Comprehensive metabolic panel; Future  -     CBC and differential; Future  -     Hemoglobin A1C; Future  -     Lipid panel; Future  -     Microalbumin / creatinine urine ratio; Future    Hypothyroidism, acquired  -     T4, free; Future  -     TSH, 3rd generation; Future    Mixed hyperlipidemia    Need for influenza vaccination  -     influenza vaccine, [id number], high-dose, PF 0 5 mL (FLUZONE HIGH-DOSE)    Screening for breast cancer  -     Mammo screening bilateral w 3d & cad; Future          Subjective:      Patient ID: Alcon Christian is a 79 y o  female  Patient comes in today for routine follow-up and Medicare wellness  She states she is doing okay  She did have her ear surgery and had tubes placed  However, she states now her hearing is worse  She is still following with ENT  Her blood work shows her sugars have come up again  She is taking the medicine as directed  She knows she could exercise some more  Could do better with the diet  Thyroid levels are controlled  Cholesterol is doing okay  Vitamin-D is doing better  Other than her ears, she reports no other complaints  No further additions to her history        ALLERGIES:  Allergies   Allergen Reactions    Bee Venom Shortness Of Breath and Swelling    Latex Rash       CURRENT MEDICATIONS:    Current Outpatient Medications:     busPIRone (BUSPAR) 10 mg tablet, Take 1 tablet (10 mg total) by mouth 2 (two) times a day as needed (anxiety), Disp: 60 tablet, Rfl: 3    cholecalciferol (VITAMIN D3) 1,000 units tablet, Take 1,000 Units by mouth daily, Disp: , Rfl:     DOCOSAHEXAENOIC ACID PO, Take 1 tablet by mouth, Disp: , Rfl:     fluticasone (FLONASE) 50 mcg/act nasal spray, , Disp: , Rfl:     ibandronate (BONIVA) 150 MG tablet, Take 1 tablet (150 mg total) by mouth every 30 (thirty) days, Disp: 4 tablet, Rfl: 3    levothyroxine 25 mcg tablet, TAKE 1 TABLET BY MOUTH EVERY DAY, Disp: 30 tablet, Rfl: 2    Meloxicam 5 MG CAPS, Take 5 mg by mouth, Disp: , Rfl:     metFORMIN (GLUCOPHAGE) 1000 MG tablet, Take 1 tablet (1,000 mg total) by mouth 2 (two) times a day with meals, Disp: 60 tablet, Rfl: 5    methocarbamol (ROBAXIN) 500 mg tablet, Take 500 mg by mouth Three times daily as needed, Disp: , Rfl:     rosuvastatin (CRESTOR) 10 MG tablet, Take 1 tablet (10 mg total) by mouth daily at bedtime, Disp: 30 tablet, Rfl: 5    ACTIVE PROBLEM LIST:  Patient Active Problem List   Diagnosis    Basal cell carcinoma of back    Multiple sclerosis (HCC)    Hypothyroidism, acquired    Mixed hyperlipidemia    Age related osteoporosis    Type 2 diabetes mellitus without complication, without long-term current use of insulin (HCC)       PAST MEDICAL HISTORY:  No past medical history on file      PAST SURGICAL HISTORY:  Past Surgical History:   Procedure Laterality Date    MYRINGOTOMY W/ TUBES         FAMILY HISTORY:  Family History   Problem Relation Age of Onset    Thyroid cancer Mother     Prostate cancer Father        SOCIAL HISTORY:  Social History     Socioeconomic History    Marital status: /Civil Union     Spouse name: Not on file    Number of children: Not on file    Years of education: Not on file    Highest education level: Not on file   Occupational History    Not on file   Social Needs    Financial resource strain: Not on file    Food insecurity:     Worry: Not on file     Inability: Not on file    Transportation needs:     Medical: Not on file     Non-medical: Not on file   Tobacco Use    Smoking status: Never Smoker    Smokeless tobacco: Never Used   Substance and Sexual Activity    Alcohol use: Not on file    Drug use: Not on file    Sexual activity: Not on file   Lifestyle    Physical activity:     Days per week: Not on file     Minutes per session: Not on file    Stress: Not on file   Relationships    Social connections:     Talks on phone: Not on file     Gets together: Not on file     Attends [employer] service: Not on file     Active member of club or organization: Not on file     Attends meetings of clubs or organizations: Not on file     Relationship status: Not on file    Intimate partner violence:     Fear of current or ex partner: Not on file     Emotionally abused: Not on file     Physically abused: Not on file     Forced sexual activity: Not on file   Other Topics Concern    Not on file   Social History Narrative    Not on file       Review of Systems   Respiratory: Negative for shortness of breath  Cardiovascular: Negative for chest pain  Gastrointestinal: Negative for abdominal pain  Objective:  Vitals:    11/13/19 1108   BP: 128/80   BP Location: Left arm   Patient Position: Sitting   Cuff Size: Standard   Pulse: 100   Resp: 16   SpO2: 99%   Weight: 64 4 kg (142 lb)   Height: 5' (1 524 m)     Body mass index is 27 73 kg/m²  Physical Exam   Constitutional: She is oriented to person, place, and time  She appears well-developed and well-nourished  Cardiovascular: Normal rate, regular rhythm and normal heart sounds  Pulmonary/Chest: Effort normal and breath sounds normal    Abdominal: Soft  There is no tenderness  Neurological: She is alert and oriented to person, place, and time  Nursing note and vitals reviewed          RESULTS:    Recent Results (from the past 1008 hour(s))   CBC and differential    Collection Time: 10/30/19  7:49 AM   Result Value Ref Range    WBC 6 75 4 31 - 10 16 Thousand/uL    RBC 4 63 3 81 - 5 12 Million/uL    Hemoglobin 12 9 11 5 - 15 4 g/dL    Hematocrit 41 6 34 8 - 46 1 %    MCV 90 82 - 98 fL    MCH 27 9 26 8 - 34 3 pg    MCHC 31 0 (L) 31 4 - 37 4 g/dL    RDW 13 7 11 6 - 15 1 %    MPV 11 6 8 9 - 12 7 fL    Platelets 775 036 - 568 Thousands/uL    nRBC 0 /100 WBCs    Neutrophils Relative 58 43 - 75 %    Immat GRANS % 0 0 - 2 %    Lymphocytes Relative 31 14 - 44 %    Monocytes Relative 8 4 - 12 %    Eosinophils Relative 2 0 - 6 %    Basophils Relative 1 0 - 1 %    Neutrophils Absolute 3 94 1 85 - 7 62 Thousands/µL    Immature Grans Absolute 0 02 0 00 - 0 20 Thousand/uL    Lymphocytes Absolute 2 07 0 60 - 4 47 Thousands/µL    Monocytes Absolute 0 54 0 17 - 1 22 Thousand/µL    Eosinophils Absolute 0 14 0 00 - 0 61 Thousand/µL    Basophils Absolute 0 04 0 00 - 0 10 Thousands/µL   Comprehensive metabolic panel    Collection Time: 10/30/19  7:49 AM   Result Value Ref Range    Sodium 138 136 - 145 mmol/L    Potassium 3 9 3 5 - 5 3 mmol/L    Chloride 105 100 - 108 mmol/L    CO2 27 21 - 32 mmol/L    ANION GAP 6 4 - 13 mmol/L    BUN 13 5 - 25 mg/dL    Creatinine 0 77 0 60 - 1 30 mg/dL    Glucose, Fasting 175 (H) 65 - 99 mg/dL    Calcium 9 7 8 3 - 10 1 mg/dL    AST 24 5 - 45 U/L    ALT 50 12 - 78 U/L    Alkaline Phosphatase 90 46 - 116 U/L    Total Protein 8 2 6 4 - 8 2 g/dL    Albumin 4 7 3 5 - 5 0 g/dL    Total Bilirubin 0 42 0 20 - 1 00 mg/dL    eGFR 81 ml/min/1 73sq m   Hemoglobin A1C    Collection Time: 10/30/19  7:49 AM   Result Value Ref Range    Hemoglobin A1C 7 7 (H) 4 2 - 6 3 %     mg/dl   Lipid panel    Collection Time: 10/30/19  7:49 AM   Result Value Ref Range    Cholesterol 154 50 - 200 mg/dL    Triglycerides 183 (H) <=150 mg/dL    HDL, Direct 41 >=40 mg/dL    LDL Calculated 76 0 - 100 mg/dL    Non-HDL-Chol (CHOL-HDL) 113 mg/dl   Microalbumin / creatinine urine ratio    Collection Time: 10/30/19  7:49 AM   Result Value Ref Range    Creatinine, Ur 227 0 mg/dL    Microalbum  ,U,Random 82 5 (H) 0 0 - 20 0 mg/L    Microalb Creat Ratio 36 (H) 0 - 30 mg/g creatinine   TSH, 3rd generation    Collection Time: 10/30/19  7:49 AM   Result Value Ref Range    TSH 3RD GENERATON 2 600 0 358 - 3 740 uIU/mL   T4, free    Collection Time: 10/30/19  7:49 AM   Result Value Ref Range    Free T4 1 02 0 76 - 1 46 ng/dL   Vitamin D 25 hydroxy    Collection Time: 10/30/19  7:49 AM   Result Value Ref Range    Vit D, 25-Hydroxy 35 5 30 0 - 100 0 ng/mL       This note was created with voice recognition software  Phonic, grammatical and spelling errors may be present within the note as a result

## 2019-11-13 NOTE — PROGRESS NOTES
Assessment and Plan:     Problem List Items Addressed This Visit     None           Preventive health issues were discussed with patient, and age appropriate screening tests were ordered as noted in patient's After Visit Summary  Personalized health advice and appropriate referrals for health education or preventive services given if needed, as noted in patient's After Visit Summary  History of Present Illness:     Patient presents for Welcome to Medicare visit  Patient Care Team:  Clara Dick MD as PCP - General (Internal Medicine)  Dimitri Workman MD     Review of Systems:     Review of Systems   Respiratory: Negative for shortness of breath  Cardiovascular: Negative for chest pain  Gastrointestinal: Negative for abdominal pain  Problem List:     Patient Active Problem List   Diagnosis    Basal cell carcinoma of back    Multiple sclerosis ([facility] )    Hypothyroidism, acquired    Mixed hyperlipidemia    Age related osteoporosis    Type 2 diabetes mellitus without complication, without long-term current use of insulin ([facility] )      Past Medical and Surgical History:     No past medical history on file    Past Surgical History:   Procedure Laterality Date    MYRINGOTOMY W/ TUBES        Family History:     Family History   Problem Relation Age of Onset    Thyroid cancer Mother     Prostate cancer Father       Social History:     Social History     Socioeconomic History    Marital status: /Civil Union     Spouse name: Not on file    Number of children: Not on file    Years of education: Not on file    Highest education level: Not on file   Occupational History    Not on file   Social Needs    Financial resource strain: Not on file    Food insecurity:     Worry: Not on file     Inability: Not on file    Transportation needs:     Medical: Not on file     Non-medical: Not on file   Tobacco Use    Smoking status: Never Smoker    Smokeless tobacco: Never Used   Substance and Sexual Activity    Alcohol use: Not on file    Drug use: Not on file    Sexual activity: Not on file   Lifestyle    Physical activity:     Days per week: Not on file     Minutes per session: Not on file    Stress: Not on file   Relationships    Social connections:     Talks on phone: Not on file     Gets together: Not on file     Attends Hinduism service: Not on file     Active member of club or organization: Not on file     Attends meetings of clubs or organizations: Not on file     Relationship status: Not on file    Intimate partner violence:     Fear of current or ex partner: Not on file     Emotionally abused: Not on file     Physically abused: Not on file     Forced sexual activity: Not on file   Other Topics Concern    Not on file   Social History Narrative    Not on file      Medications and Allergies:     Current Outpatient Medications   Medication Sig Dispense Refill    busPIRone (BUSPAR) 10 mg tablet Take 1 tablet (10 mg total) by mouth 2 (two) times a day as needed (anxiety) 60 tablet 3    cholecalciferol (VITAMIN D3) 1,000 units tablet Take 1,000 Units by mouth daily      DOCOSAHEXAENOIC ACID PO Take 1 tablet by mouth      fluticasone (FLONASE) 50 mcg/act nasal spray       ibandronate (BONIVA) 150 MG tablet Take 1 tablet (150 mg total) by mouth every 30 (thirty) days 4 tablet 3    levothyroxine 25 mcg tablet TAKE 1 TABLET BY MOUTH EVERY DAY 30 tablet 2    Meloxicam 5 MG CAPS Take 5 mg by mouth      metFORMIN (GLUCOPHAGE) 1000 MG tablet TAKE 1 TABLET BY MOUTH EVERY DAY WITH BREAKFAST 30 tablet 5    methocarbamol (ROBAXIN) 500 mg tablet Take 500 mg by mouth Three times daily as needed      rosuvastatin (CRESTOR) 10 MG tablet Take 1 tablet (10 mg total) by mouth daily at bedtime 30 tablet 5     No current facility-administered medications for this visit        Allergies   Allergen Reactions    Bee Venom Shortness Of Breath and Swelling    Latex Rash      Immunizations:     Immunization History   Administered Date(s) Administered    INFLUENZA 09/20/2016, 01/21/2018    Influenza, high dose seasonal 0 5 mL 01/25/2019    Pneumococcal Conjugate 13-Valent 12/11/2015, 06/27/2018    Pneumococcal Polysaccharide PPV23 07/01/2019    Tdap 02/02/2018    Tuberculin Skin Test-PPD Intradermal 05/17/2017      Health Maintenance:         Topic Date Due    CRC Screening: Colonoscopy  1950    MAMMOGRAM  10/20/2019    CRC Screening: Cologuard  04/10/2022    Hepatitis C Screening  Completed         Topic Date Due    HEPATITIS B VACCINES (1 of 3 - Risk 3-dose series) 11/14/1971    INFLUENZA VACCINE  07/01/2019      Medicare Screening Tests and Risk Assessments:     Bety is here for her Initial Wellness visit  Health Risk Assessment:   Patient rates overall health as good  Patient feels that their physical health rating is same  Eyesight was rated as slightly worse  Hearing was rated as much worse  Patient feels that their emotional and mental health rating is same  Pain experienced in the last 7 days has been some  Patient's pain rating has been 5/10  Patient states that she has experienced no weight loss or gain in last 6 months  Depression Screening:   PHQ-2 Score: 0      Fall Risk Screening: In the past year, patient has experienced: no history of falling in past year      Urinary Incontinence Screening:   Patient has leaked urine accidently in the last six months  Home Safety:  Patient does not have trouble with stairs inside or outside of their home  Patient has working smoke alarms and has working carbon monoxide detector  Home safety hazards include: none  Nutrition:   Current diet is Diabetic and Low Carb  Medications:   Patient is currently taking over-the-counter supplements  OTC medications include: see medication list  Patient is able to manage medications       Activities of Daily Living (ADLs)/Instrumental Activities of Daily Living (IADLs):   Walk and transfer into and out of bed and chair?: Yes  Dress and groom yourself?: Yes    Bathe or shower yourself?: Yes    Feed yourself? Yes  Do your laundry/housekeeping?: Yes  Manage your money, pay your bills and track your expenses?: Yes  Make your own meals?: Yes    Do your own shopping?: Yes    Previous Hospitalizations:   Any hospitalizations or ED visits within the last 12 months?: Yes    How many hospitalizations have you had in the last year?: 1-2    Advance Care Planning:   Living will: Yes    Durable POA for healthcare: Yes    Advanced directive: Yes    Advanced directive counseling given: Yes      Cognitive Screening:   Provider or family/friend/caregiver concerned regarding cognition?: No    PREVENTIVE SCREENINGS      Cardiovascular Screening:    General: Screening Not Indicated and History Lipid Disorder      Diabetes Screening:     General: Screening Not Indicated and History Diabetes      Colorectal Cancer Screening:     General: Screening Current      Breast Cancer Screening:     General: Screening Current      Cervical Cancer Screening:    General: Screening Not Indicated      Osteoporosis Screening:    General: Screening Not Indicated and History Osteoporosis      Abdominal Aortic Aneurysm (AAA) Screening:        General: Screening Not Indicated      Lung Cancer Screening:     General: Screening Not Indicated      Hepatitis C Screening:    General: Screening Current    No exam data present     Physical Exam:     There were no vitals taken for this visit  Physical Exam   Psychiatric: She has a normal mood and affect   Her behavior is normal  Judgment and thought content normal        Yonatan Hair MD

## 2019-11-13 NOTE — PATIENT INSTRUCTIONS
Medicare Preventive Visit Patient Instructions  Thank you for completing your Welcome to Medicare Visit or Medicare Annual Wellness Visit today  Your next wellness visit will be due in one year (11/13/2020)  The screening/preventive services that you may require over the next 5-10 years are detailed below  Some tests may not apply to you based off risk factors and/or age  Screening tests ordered at today's visit but not completed yet may show as past due  Also, please note that scanned in results may not display below  Preventive Screenings:  Service Recommendations Previous Testing/Comments   Colorectal Cancer Screening  * Colonoscopy    * Fecal Occult Blood Test (FOBT)/Fecal Immunochemical Test (FIT)  * Fecal DNA/Cologuard Test  * Flexible Sigmoidoscopy Age: 54-65 years old   Colonoscopy: every 10 years (may be performed more frequently if at higher risk)  OR  FOBT/FIT: every 1 year  OR  Cologuard: every 3 years  OR  Sigmoidoscopy: every 5 years  Screening may be recommended earlier than age 48 if at higher risk for colorectal cancer  Also, an individualized decision between you and your healthcare provider will decide whether screening between the ages of 74-80 would be appropriate  Colonoscopy: Not on file  FOBT/FIT: Not on file  Cologuard: 04/10/2019  Sigmoidoscopy: Not on file    Screening Current     Breast Cancer Screening Age: 36 years old  Frequency: every 1-2 years  Not required if history of left and right mastectomy Mammogram: 10/20/2018    Screening Current   Cervical Cancer Screening Between the ages of 21-29, pap smear recommended once every 3 years  Between the ages of 33-67, can perform pap smear with HPV co-testing every 5 years     Recommendations may differ for women with a history of total hysterectomy, cervical cancer, or abnormal pap smears in past  Pap Smear: Not on file    Screening Not Indicated   Hepatitis C Screening Once for adults born between 1945 and 1965  More frequently in patients at high risk for Hepatitis C Hep C Antibody: 01/28/2019    Screening Current   Diabetes Screening 1-2 times per year if you're at risk for diabetes or have pre-diabetes Fasting glucose: 175 mg/dL   A1C: 7 7 %    Screening Not Indicated  History Diabetes   Cholesterol Screening Once every 5 years if you don't have a lipid disorder  May order more often based on risk factors  Lipid panel: 10/30/2019    Screening Not Indicated  History Lipid Disorder     Other Preventive Screenings Covered by Medicare:  1  Abdominal Aortic Aneurysm (AAA) Screening: covered once if your at risk  You're considered to be at risk if you have a family history of AAA  2  Lung Cancer Screening: covers low dose CT scan once per year if you meet all of the following conditions: (1) Age 50-69; (2) No signs or symptoms of lung cancer; (3) Current smoker or have quit smoking within the last 15 years; (4) You have a tobacco smoking history of at least 30 pack years (packs per day multiplied by number of years you smoked); (5) You get a written order from a healthcare provider  3  Glaucoma Screening: covered annually if you're considered high risk: (1) You have diabetes OR (2) Family history of glaucoma OR (3)  aged 48 and older OR (3)  American aged 72 and older  3  Osteoporosis Screening: covered every 2 years if you meet one of the following conditions: (1) You're estrogen deficient and at risk for osteoporosis based off medical history and other findings; (2) Have a vertebral abnormality; (3) On glucocorticoid therapy for more than 3 months; (4) Have primary hyperparathyroidism; (5) On osteoporosis medications and need to assess response to drug therapy  · Last bone density test (DXA Scan): 12/10/2018  5  HIV Screening: covered annually if you're between the age of 12-76  Also covered annually if you are younger than 13 and older than 72 with risk factors for HIV infection   For pregnant patients, it is covered up to 3 times per pregnancy  Immunizations:  Immunization Recommendations   Influenza Vaccine Annual influenza vaccination during flu season is recommended for all persons aged >= 6 months who do not have contraindications   Pneumococcal Vaccine (Prevnar and Pneumovax)  * Prevnar = PCV13  * Pneumovax = PPSV23   Adults 25-60 years old: 1-3 doses may be recommended based on certain risk factors  Adults 72 years old: Prevnar (PCV13) vaccine recommended followed by Pneumovax (PPSV23) vaccine  If already received PPSV23 since turning 65, then PCV13 recommended at least one year after PPSV23 dose  Hepatitis B Vaccine 3 dose series if at intermediate or high risk (ex: diabetes, end stage renal disease, liver disease)   Tetanus (Td) Vaccine - COST NOT COVERED BY MEDICARE PART B Following completion of primary series, a booster dose should be given every 10 years to maintain immunity against tetanus  Td may also be given as tetanus wound prophylaxis  Tdap Vaccine - COST NOT COVERED BY MEDICARE PART B Recommended at least once for all adults  For pregnant patients, recommended with each pregnancy  Shingles Vaccine (Shingrix) - COST NOT COVERED BY MEDICARE PART B  2 shot series recommended in those aged 48 and above     Health Maintenance Due:      Topic Date Due    CRC Screening: Colonoscopy  1950    MAMMOGRAM  10/20/2019    CRC Screening: Cologuard  04/10/2022    Hepatitis C Screening  Completed     Immunizations Due:      Topic Date Due    HEPATITIS B VACCINES (1 of 3 - Risk 3-dose series) 11/14/1971    INFLUENZA VACCINE  07/01/2019     Advance Directives   What are advance directives? Advance directives are legal documents that state your wishes and plans for medical care  These plans are made ahead of time in case you lose your ability to make decisions for yourself  Advance directives can apply to any medical decision, such as the treatments you want, and if you want to donate organs  What are the types of advance directives? There are many types of advance directives, and each state has rules about how to use them  You may choose a combination of any of the following:  · Living will: This is a written record of the treatment you want  You can also choose which treatments you do not want, which to limit, and which to stop at a certain time  This includes surgery, medicine, IV fluid, and tube feedings  · Durable power of  for healthcare [facility]): This is a written record that states who you want to make healthcare choices for you when you are unable to make them for yourself  This person, called a proxy, is usually a family member or a friend  You may choose more than 1 proxy  · Do not resuscitate (DNR) order:  A DNR order is used in case your heart stops beating or you stop breathing  It is a request not to have certain forms of treatment, such as CPR  A DNR order may be included in other types of advance directives  · Medical directive: This covers the care that you want if you are in a coma, near death, or unable to make decisions for yourself  You can list the treatments you want for each condition  Treatment may include pain medicine, surgery, blood transfusions, dialysis, IV or tube feedings, and a ventilator (breathing machine)  · Values history: This document has questions about your views, beliefs, and how you feel and think about life  This information can help others choose the care that you would choose  Why are advance directives important? An advance directive helps you control your care  Although spoken wishes may be used, it is better to have your wishes written down  Spoken wishes can be misunderstood, or not followed  Treatments may be given even if you do not want them  An advance directive may make it easier for your family to make difficult choices about your care     Urinary Incontinence   Urinary incontinence (UI)  is when you lose control of your bladder  UI develops because your bladder cannot store or empty urine properly  The 3 most common types of UI are stress incontinence, urge incontinence, or both  Medicines:   · May be given to help strengthen your bladder control  Report any side effects of medication to your healthcare provider  Do pelvic muscle exercises often:  Your pelvic muscles help you stop urinating  Squeeze these muscles tight for 5 seconds, then relax for 5 seconds  Gradually work up to squeezing for 10 seconds  Do 3 sets of 15 repetitions a day, or as directed  This will help strengthen your pelvic muscles and improve bladder control  Train your bladder:  Go to the bathroom at set times, such as every 2 hours, even if you do not feel the urge to go  You can also try to hold your urine when you feel the urge to go  For example, hold your urine for 5 minutes when you feel the urge to go  As that becomes easier, hold your urine for 10 minutes  Self-care:   · Keep a UI record  Write down how often you leak urine and how much you leak  Make a note of what you were doing when you leaked urine  · Drink liquids as directed  You may need to limit the amount of liquid you drink to help control your urine leakage  Do not drink any liquid right before you go to bed  Limit or do not have drinks that contain caffeine or alcohol  · Prevent constipation  Eat a variety of high-fiber foods  Good examples are high-fiber cereals, beans, vegetables, and whole-grain breads  Walking is the best way to trigger your intestines to have a bowel movement  · Exercise regularly and maintain a healthy weight  Weight loss and exercise will decrease pressure on your bladder and help you control your leakage  · Use a catheter as directed  to help empty your bladder  A catheter is a tiny, plastic tube that is put into your bladder to drain your urine  · Go to behavior therapy as directed    Behavior therapy may be used to help you learn to control your urge to urinate  Weight Management   Why it is important to manage your weight:  Being overweight increases your risk of health conditions such as heart disease, high blood pressure, type 2 diabetes, and certain types of cancer  It can also increase your risk for osteoarthritis, sleep apnea, and other respiratory problems  Aim for a slow, steady weight loss  Even a small amount of weight loss can lower your risk of health problems  How to lose weight safely:  A safe and healthy way to lose weight is to eat fewer calories and get regular exercise  You can lose up about 1 pound a week by decreasing the number of calories you eat by 500 calories each day  Healthy meal plan for weight management:  A healthy meal plan includes a variety of foods, contains fewer calories, and helps you stay healthy  A healthy meal plan includes the following:  · Eat whole-grain foods more often  A healthy meal plan should contain fiber  Fiber is the part of grains, fruits, and vegetables that is not broken down by your body  Whole-grain foods are healthy and provide extra fiber in your diet  Some examples of whole-grain foods are whole-wheat breads and pastas, oatmeal, brown rice, and bulgur  · Eat a variety of vegetables every day  Include dark, leafy greens such as spinach, kale, keli greens, and mustard greens  Eat yellow and orange vegetables such as carrots, sweet potatoes, and winter squash  · Eat a variety of fruits every day  Choose fresh or canned fruit (canned in its own juice or light syrup) instead of juice  Fruit juice has very little or no fiber  · Eat low-fat dairy foods  Drink fat-free (skim) milk or 1% milk  Eat fat-free yogurt and low-fat cottage cheese  Try low-fat cheeses such as mozzarella and other reduced-fat cheeses  · Choose meat and other protein foods that are low in fat  Choose beans or other legumes such as split peas or lentils   Choose fish, skinless poultry (chicken or turkey), or lean cuts of red meat (beef or pork)  Before you cook meat or poultry, cut off any visible fat  · Use less fat and oil  Try baking foods instead of frying them  Add less fat, such as margarine, sour cream, regular salad dressing and mayonnaise to foods  Eat fewer high-fat foods  Some examples of high-fat foods include french fries, doughnuts, ice cream, and cakes  · Eat fewer sweets  Limit foods and drinks that are high in sugar  This includes candy, cookies, regular soda, and sweetened drinks  Exercise:  Exercise at least 30 minutes per day on most days of the week  Some examples of exercise include walking, biking, dancing, and swimming  You can also fit in more physical activity by taking the stairs instead of the elevator or parking farther away from stores  Ask your healthcare provider about the best exercise plan for you  © Copyright [employer] 2018 Information is for End User's use only and may not be sold, redistributed or otherwise used for commercial purposes  All illustrations and images included in CareNotes® are the copyrighted property of [employer]  or [facility] Preventive Visit Patient Instructions  Thank you for completing your Welcome to Medicare Visit or Medicare Annual Wellness Visit today  Your next wellness visit will be due in one year (11/13/2020)  The screening/preventive services that you may require over the next 5-10 years are detailed below  Some tests may not apply to you based off risk factors and/or age  Screening tests ordered at today's visit but not completed yet may show as past due  Also, please note that scanned in results may not display below    Preventive Screenings:  Service Recommendations Previous Testing/Comments   Colorectal Cancer Screening  * Colonoscopy    * Fecal Occult Blood Test (FOBT)/Fecal Immunochemical Test (FIT)  * Fecal DNA/Cologuard Test  * Flexible Sigmoidoscopy Age: 54-65 years old   Colonoscopy: every 10 years (may be performed more frequently if at higher risk)  OR  FOBT/FIT: every 1 year  OR  Cologuard: every 3 years  OR  Sigmoidoscopy: every 5 years  Screening may be recommended earlier than age 48 if at higher risk for colorectal cancer  Also, an individualized decision between you and your healthcare provider will decide whether screening between the ages of 74-80 would be appropriate  Colonoscopy: Not on file  FOBT/FIT: Not on file  Cologuard: 04/10/2019  Sigmoidoscopy: Not on file    Screening Current     Breast Cancer Screening Age: 36 years old  Frequency: every 1-2 years  Not required if history of left and right mastectomy Mammogram: 10/20/2018    Screening Current   Cervical Cancer Screening Between the ages of 21-29, pap smear recommended once every 3 years  Between the ages of 33-67, can perform pap smear with HPV co-testing every 5 years  Recommendations may differ for women with a history of total hysterectomy, cervical cancer, or abnormal pap smears in past  Pap Smear: Not on file    Screening Not Indicated   Hepatitis C Screening Once for adults born between 1945 and 1965  More frequently in patients at high risk for Hepatitis C Hep C Antibody: 01/28/2019    Screening Current   Diabetes Screening 1-2 times per year if you're at risk for diabetes or have pre-diabetes Fasting glucose: 175 mg/dL   A1C: 7 7 %    Screening Not Indicated  History Diabetes   Cholesterol Screening Once every 5 years if you don't have a lipid disorder  May order more often based on risk factors  Lipid panel: 10/30/2019    Screening Not Indicated  History Lipid Disorder     Other Preventive Screenings Covered by Medicare:  6  Abdominal Aortic Aneurysm (AAA) Screening: covered once if your at risk  You're considered to be at risk if you have a family history of AAA    7  Lung Cancer Screening: covers low dose CT scan once per year if you meet all of the following conditions: (1) Age 50-69; (2) No signs or symptoms of lung cancer; (3) Current smoker or have quit smoking within the last 15 years; (4) You have a tobacco smoking history of at least 30 pack years (packs per day multiplied by number of years you smoked); (5) You get a written order from a healthcare provider  8  Glaucoma Screening: covered annually if you're considered high risk: (1) You have diabetes OR (2) Family history of glaucoma OR (3)  aged 48 and older OR (3)  American aged 72 and older  5  Osteoporosis Screening: covered every 2 years if you meet one of the following conditions: (1) You're estrogen deficient and at risk for osteoporosis based off medical history and other findings; (2) Have a vertebral abnormality; (3) On glucocorticoid therapy for more than 3 months; (4) Have primary hyperparathyroidism; (5) On osteoporosis medications and need to assess response to drug therapy  · Last bone density test (DXA Scan): 12/10/2018  10  HIV Screening: covered annually if you're between the age of 12-76  Also covered annually if you are younger than 13 and older than 72 with risk factors for HIV infection  For pregnant patients, it is covered up to 3 times per pregnancy  Immunizations:  Immunization Recommendations   Influenza Vaccine Annual influenza vaccination during flu season is recommended for all persons aged >= 6 months who do not have contraindications   Pneumococcal Vaccine (Prevnar and Pneumovax)  * Prevnar = PCV13  * Pneumovax = PPSV23   Adults 25-60 years old: 1-3 doses may be recommended based on certain risk factors  Adults 72 years old: Prevnar (PCV13) vaccine recommended followed by Pneumovax (PPSV23) vaccine  If already received PPSV23 since turning 65, then PCV13 recommended at least one year after PPSV23 dose     Hepatitis B Vaccine 3 dose series if at intermediate or high risk (ex: diabetes, end stage renal disease, liver disease)   Tetanus (Td) Vaccine - COST NOT COVERED BY MEDICARE PART B Following completion of primary series, a booster dose should be given every 10 years to maintain immunity against tetanus  Td may also be given as tetanus wound prophylaxis  Tdap Vaccine - COST NOT COVERED BY MEDICARE PART B Recommended at least once for all adults  For pregnant patients, recommended with each pregnancy  Shingles Vaccine (Shingrix) - COST NOT COVERED BY MEDICARE PART B  2 shot series recommended in those aged 48 and above     Health Maintenance Due:      Topic Date Due    CRC Screening: Colonoscopy  1950    MAMMOGRAM  10/20/2019    CRC Screening: Cologuard  04/10/2022    Hepatitis C Screening  Completed     Immunizations Due:      Topic Date Due    HEPATITIS B VACCINES (1 of 3 - Risk 3-dose series) 11/14/1971    INFLUENZA VACCINE  07/01/2019     Advance Directives   What are advance directives? Advance directives are legal documents that state your wishes and plans for medical care  These plans are made ahead of time in case you lose your ability to make decisions for yourself  Advance directives can apply to any medical decision, such as the treatments you want, and if you want to donate organs  What are the types of advance directives? There are many types of advance directives, and each state has rules about how to use them  You may choose a combination of any of the following:  · Living will: This is a written record of the treatment you want  You can also choose which treatments you do not want, which to limit, and which to stop at a certain time  This includes surgery, medicine, IV fluid, and tube feedings  · Durable power of  for healthcare [facility]): This is a written record that states who you want to make healthcare choices for you when you are unable to make them for yourself  This person, called a proxy, is usually a family member or a friend  You may choose more than 1 proxy  · Do not resuscitate (DNR) order:  A DNR order is used in case your heart stops beating or you stop breathing   It is a request not to have certain forms of treatment, such as CPR  A DNR order may be included in other types of advance directives  · Medical directive: This covers the care that you want if you are in a coma, near death, or unable to make decisions for yourself  You can list the treatments you want for each condition  Treatment may include pain medicine, surgery, blood transfusions, dialysis, IV or tube feedings, and a ventilator (breathing machine)  · Values history: This document has questions about your views, beliefs, and how you feel and think about life  This information can help others choose the care that you would choose  Why are advance directives important? An advance directive helps you control your care  Although spoken wishes may be used, it is better to have your wishes written down  Spoken wishes can be misunderstood, or not followed  Treatments may be given even if you do not want them  An advance directive may make it easier for your family to make difficult choices about your care  Urinary Incontinence   Urinary incontinence (UI)  is when you lose control of your bladder  UI develops because your bladder cannot store or empty urine properly  The 3 most common types of UI are stress incontinence, urge incontinence, or both  Medicines:   · May be given to help strengthen your bladder control  Report any side effects of medication to your healthcare provider  Do pelvic muscle exercises often:  Your pelvic muscles help you stop urinating  Squeeze these muscles tight for 5 seconds, then relax for 5 seconds  Gradually work up to squeezing for 10 seconds  Do 3 sets of 15 repetitions a day, or as directed  This will help strengthen your pelvic muscles and improve bladder control  Train your bladder:  Go to the bathroom at set times, such as every 2 hours, even if you do not feel the urge to go  You can also try to hold your urine when you feel the urge to go   For example, hold your urine for 5 minutes when you feel the urge to go  As that becomes easier, hold your urine for 10 minutes  Self-care:   · Keep a UI record  Write down how often you leak urine and how much you leak  Make a note of what you were doing when you leaked urine  · Drink liquids as directed  You may need to limit the amount of liquid you drink to help control your urine leakage  Do not drink any liquid right before you go to bed  Limit or do not have drinks that contain caffeine or alcohol  · Prevent constipation  Eat a variety of high-fiber foods  Good examples are high-fiber cereals, beans, vegetables, and whole-grain breads  Walking is the best way to trigger your intestines to have a bowel movement  · Exercise regularly and maintain a healthy weight  Weight loss and exercise will decrease pressure on your bladder and help you control your leakage  · Use a catheter as directed  to help empty your bladder  A catheter is a tiny, plastic tube that is put into your bladder to drain your urine  · Go to behavior therapy as directed  Behavior therapy may be used to help you learn to control your urge to urinate  Weight Management   Why it is important to manage your weight:  Being overweight increases your risk of health conditions such as heart disease, high blood pressure, type 2 diabetes, and certain types of cancer  It can also increase your risk for osteoarthritis, sleep apnea, and other respiratory problems  Aim for a slow, steady weight loss  Even a small amount of weight loss can lower your risk of health problems  How to lose weight safely:  A safe and healthy way to lose weight is to eat fewer calories and get regular exercise  You can lose up about 1 pound a week by decreasing the number of calories you eat by 500 calories each day  Healthy meal plan for weight management:  A healthy meal plan includes a variety of foods, contains fewer calories, and helps you stay healthy   A healthy meal plan includes the following:  · Eat whole-grain foods more often  A healthy meal plan should contain fiber  Fiber is the part of grains, fruits, and vegetables that is not broken down by your body  Whole-grain foods are healthy and provide extra fiber in your diet  Some examples of whole-grain foods are whole-wheat breads and pastas, oatmeal, brown rice, and bulgur  · Eat a variety of vegetables every day  Include dark, leafy greens such as spinach, kale, keli greens, and mustard greens  Eat yellow and orange vegetables such as carrots, sweet potatoes, and winter squash  · Eat a variety of fruits every day  Choose fresh or canned fruit (canned in its own juice or light syrup) instead of juice  Fruit juice has very little or no fiber  · Eat low-fat dairy foods  Drink fat-free (skim) milk or 1% milk  Eat fat-free yogurt and low-fat cottage cheese  Try low-fat cheeses such as mozzarella and other reduced-fat cheeses  · Choose meat and other protein foods that are low in fat  Choose beans or other legumes such as split peas or lentils  Choose fish, skinless poultry (chicken or turkey), or lean cuts of red meat (beef or pork)  Before you cook meat or poultry, cut off any visible fat  · Use less fat and oil  Try baking foods instead of frying them  Add less fat, such as margarine, sour cream, regular salad dressing and mayonnaise to foods  Eat fewer high-fat foods  Some examples of high-fat foods include french fries, doughnuts, ice cream, and cakes  · Eat fewer sweets  Limit foods and drinks that are high in sugar  This includes candy, cookies, regular soda, and sweetened drinks  Exercise:  Exercise at least 30 minutes per day on most days of the week  Some examples of exercise include walking, biking, dancing, and swimming  You can also fit in more physical activity by taking the stairs instead of the elevator or parking farther away from stores  Ask your healthcare provider about the best exercise plan for you        © Copyright [employer] 2018 Information is for [employer] use only and may not be sold, redistributed or otherwise used for commercial purposes   All illustrations and images included in CareNotes® are the copyrighted property of A D A M , Inc  or Formerly named [facility] Emery Simon

## 2019-12-04 NOTE — PROGRESS NOTES
Chart updated per office request  Discrete reportable data documented  Updated Mammogram DOS 11-27-19

## 2019-12-28 DIAGNOSIS — E03.9 HYPOTHYROIDISM, ACQUIRED: ICD-10-CM

## 2019-12-30 RX ORDER — LEVOTHYROXINE SODIUM 0.03 MG/1
TABLET ORAL
Qty: 30 TABLET | Refills: 5 | Status: SHIPPED | OUTPATIENT
Start: 2019-12-30 | End: 2020-01-20 | Stop reason: SDUPTHER

## 2020-01-20 ENCOUNTER — TELEPHONE (OUTPATIENT)
Dept: INTERNAL MEDICINE CLINIC | Facility: CLINIC | Age: 70
End: 2020-01-20

## 2020-01-20 DIAGNOSIS — E03.9 HYPOTHYROIDISM, ACQUIRED: ICD-10-CM

## 2020-01-20 DIAGNOSIS — E78.2 MIXED HYPERLIPIDEMIA: ICD-10-CM

## 2020-01-20 DIAGNOSIS — M81.0 AGE-RELATED OSTEOPOROSIS WITHOUT CURRENT PATHOLOGICAL FRACTURE: ICD-10-CM

## 2020-01-20 DIAGNOSIS — E11.9 TYPE 2 DIABETES MELLITUS WITHOUT COMPLICATION, WITHOUT LONG-TERM CURRENT USE OF INSULIN (HCC): ICD-10-CM

## 2020-01-20 RX ORDER — ROSUVASTATIN CALCIUM 10 MG/1
10 TABLET, COATED ORAL
Qty: 90 TABLET | Refills: 2 | Status: SHIPPED | OUTPATIENT
Start: 2020-01-20 | End: 2020-01-27 | Stop reason: SDUPTHER

## 2020-01-20 RX ORDER — LEVOTHYROXINE SODIUM 0.03 MG/1
25 TABLET ORAL DAILY
Qty: 90 TABLET | Refills: 2 | Status: SHIPPED | OUTPATIENT
Start: 2020-01-20 | End: 2020-01-27 | Stop reason: SDUPTHER

## 2020-01-20 RX ORDER — IBANDRONATE SODIUM 150 MG/1
150 TABLET, FILM COATED ORAL
Qty: 3 TABLET | Refills: 2 | Status: SHIPPED | OUTPATIENT
Start: 2020-01-20 | End: 2020-01-27 | Stop reason: SDUPTHER

## 2020-01-20 NOTE — TELEPHONE ENCOUNTER
FYI: Patient called to let you know that her insurance has changed from [facility] to [facility]  Patient said that [facility] would be reaching out to order patient's medication

## 2020-01-27 ENCOUNTER — TELEPHONE (OUTPATIENT)
Dept: INTERNAL MEDICINE CLINIC | Facility: CLINIC | Age: 70
End: 2020-01-27

## 2020-01-27 DIAGNOSIS — M81.0 AGE-RELATED OSTEOPOROSIS WITHOUT CURRENT PATHOLOGICAL FRACTURE: ICD-10-CM

## 2020-01-27 DIAGNOSIS — E03.9 HYPOTHYROIDISM, ACQUIRED: ICD-10-CM

## 2020-01-27 DIAGNOSIS — E11.9 TYPE 2 DIABETES MELLITUS WITHOUT COMPLICATION, WITHOUT LONG-TERM CURRENT USE OF INSULIN (HCC): ICD-10-CM

## 2020-01-27 DIAGNOSIS — E78.2 MIXED HYPERLIPIDEMIA: ICD-10-CM

## 2020-01-27 RX ORDER — IBANDRONATE SODIUM 150 MG/1
150 TABLET, FILM COATED ORAL
Qty: 3 TABLET | Refills: 1 | Status: SHIPPED | OUTPATIENT
Start: 2020-01-27 | End: 2020-06-16 | Stop reason: SDUPTHER

## 2020-01-27 RX ORDER — ROSUVASTATIN CALCIUM 10 MG/1
10 TABLET, COATED ORAL
Qty: 90 TABLET | Refills: 1 | Status: SHIPPED | OUTPATIENT
Start: 2020-01-27 | End: 2020-07-13

## 2020-01-27 RX ORDER — LEVOTHYROXINE SODIUM 0.03 MG/1
25 TABLET ORAL DAILY
Qty: 90 TABLET | Refills: 1 | Status: SHIPPED | OUTPATIENT
Start: 2020-01-27 | End: 2020-07-13

## 2020-01-27 NOTE — TELEPHONE ENCOUNTER
[facility] prescription dept faxed a form for patients medication to be switched to mail order  The medication is levothyroxine 25 mcg tablet, take 1 tablet by mouth daily; rosuvastatin 10 mg tablet, take 1 tablet by mouth daily at bedtime; ibandronate 150 mg tablet, take 1 tablet by mouth every 30 days; metformin 1000 mg tablet, take 1 tablet by  Mouth 2 times a day with meals    90 day supply    Any questions call back Jannette Chu

## 2020-01-30 DIAGNOSIS — E78.2 MIXED HYPERLIPIDEMIA: ICD-10-CM

## 2020-01-30 RX ORDER — ROSUVASTATIN CALCIUM 10 MG/1
10 TABLET, COATED ORAL
Qty: 30 TABLET | Refills: 0 | OUTPATIENT
Start: 2020-01-30

## 2020-03-05 ENCOUNTER — APPOINTMENT (OUTPATIENT)
Dept: LAB | Facility: CLINIC | Age: 70
End: 2020-03-05
Payer: COMMERCIAL

## 2020-03-05 DIAGNOSIS — E03.9 HYPOTHYROIDISM, ACQUIRED: ICD-10-CM

## 2020-03-05 DIAGNOSIS — E11.9 TYPE 2 DIABETES MELLITUS WITHOUT COMPLICATION, WITHOUT LONG-TERM CURRENT USE OF INSULIN (HCC): ICD-10-CM

## 2020-03-05 LAB
ALBUMIN SERPL BCP-MCNC: 4.7 G/DL (ref 3.5–5)
ALP SERPL-CCNC: 81 U/L (ref 46–116)
ALT SERPL W P-5'-P-CCNC: 70 U/L (ref 12–78)
ANION GAP SERPL CALCULATED.3IONS-SCNC: 9 MMOL/L (ref 4–13)
AST SERPL W P-5'-P-CCNC: 38 U/L (ref 5–45)
BASOPHILS # BLD AUTO: 0.05 THOUSANDS/ΜL (ref 0–0.1)
BASOPHILS NFR BLD AUTO: 1 % (ref 0–1)
BILIRUB SERPL-MCNC: 0.41 MG/DL (ref 0.2–1)
BUN SERPL-MCNC: 15 MG/DL (ref 5–25)
CALCIUM SERPL-MCNC: 9.7 MG/DL (ref 8.3–10.1)
CHLORIDE SERPL-SCNC: 104 MMOL/L (ref 100–108)
CHOLEST SERPL-MCNC: 143 MG/DL (ref 50–200)
CO2 SERPL-SCNC: 26 MMOL/L (ref 21–32)
CREAT SERPL-MCNC: 0.77 MG/DL (ref 0.6–1.3)
CREAT UR-MCNC: 187 MG/DL
EOSINOPHIL # BLD AUTO: 0.27 THOUSAND/ΜL (ref 0–0.61)
EOSINOPHIL NFR BLD AUTO: 4 % (ref 0–6)
ERYTHROCYTE [DISTWIDTH] IN BLOOD BY AUTOMATED COUNT: 13.8 % (ref 11.6–15.1)
EST. AVERAGE GLUCOSE BLD GHB EST-MCNC: 166 MG/DL
GFR SERPL CREATININE-BSD FRML MDRD: 80 ML/MIN/1.73SQ M
GLUCOSE P FAST SERPL-MCNC: 172 MG/DL (ref 65–99)
HBA1C MFR BLD: 7.4 %
HCT VFR BLD AUTO: 40.7 % (ref 34.8–46.1)
HDLC SERPL-MCNC: 43 MG/DL
HGB BLD-MCNC: 12.5 G/DL (ref 11.5–15.4)
IMM GRANULOCYTES # BLD AUTO: 0.03 THOUSAND/UL (ref 0–0.2)
IMM GRANULOCYTES NFR BLD AUTO: 0 % (ref 0–2)
LDLC SERPL CALC-MCNC: 67 MG/DL (ref 0–100)
LYMPHOCYTES # BLD AUTO: 2.14 THOUSANDS/ΜL (ref 0.6–4.47)
LYMPHOCYTES NFR BLD AUTO: 29 % (ref 14–44)
MCH RBC QN AUTO: 27.9 PG (ref 26.8–34.3)
MCHC RBC AUTO-ENTMCNC: 30.7 G/DL (ref 31.4–37.4)
MCV RBC AUTO: 91 FL (ref 82–98)
MICROALBUMIN UR-MCNC: 64.5 MG/L (ref 0–20)
MICROALBUMIN/CREAT 24H UR: 34 MG/G CREATININE (ref 0–30)
MONOCYTES # BLD AUTO: 0.49 THOUSAND/ΜL (ref 0.17–1.22)
MONOCYTES NFR BLD AUTO: 7 % (ref 4–12)
NEUTROPHILS # BLD AUTO: 4.53 THOUSANDS/ΜL (ref 1.85–7.62)
NEUTS SEG NFR BLD AUTO: 59 % (ref 43–75)
NONHDLC SERPL-MCNC: 100 MG/DL
NRBC BLD AUTO-RTO: 0 /100 WBCS
PLATELET # BLD AUTO: 254 THOUSANDS/UL (ref 149–390)
PMV BLD AUTO: 11.9 FL (ref 8.9–12.7)
POTASSIUM SERPL-SCNC: 4.1 MMOL/L (ref 3.5–5.3)
PROT SERPL-MCNC: 8 G/DL (ref 6.4–8.2)
RBC # BLD AUTO: 4.48 MILLION/UL (ref 3.81–5.12)
SODIUM SERPL-SCNC: 139 MMOL/L (ref 136–145)
T4 FREE SERPL-MCNC: 0.9 NG/DL (ref 0.76–1.46)
TRIGL SERPL-MCNC: 165 MG/DL
TSH SERPL DL<=0.05 MIU/L-ACNC: 4.49 UIU/ML (ref 0.36–3.74)
WBC # BLD AUTO: 7.51 THOUSAND/UL (ref 4.31–10.16)

## 2020-03-05 PROCEDURE — 84439 ASSAY OF FREE THYROXINE: CPT

## 2020-03-05 PROCEDURE — 36415 COLL VENOUS BLD VENIPUNCTURE: CPT

## 2020-03-05 PROCEDURE — 84443 ASSAY THYROID STIM HORMONE: CPT

## 2020-03-05 PROCEDURE — 82043 UR ALBUMIN QUANTITATIVE: CPT

## 2020-03-05 PROCEDURE — 85025 COMPLETE CBC W/AUTO DIFF WBC: CPT

## 2020-03-05 PROCEDURE — 80061 LIPID PANEL: CPT

## 2020-03-05 PROCEDURE — 82570 ASSAY OF URINE CREATININE: CPT

## 2020-03-05 PROCEDURE — 80053 COMPREHEN METABOLIC PANEL: CPT

## 2020-03-05 PROCEDURE — 83036 HEMOGLOBIN GLYCOSYLATED A1C: CPT

## 2020-03-12 ENCOUNTER — TELEPHONE (OUTPATIENT)
Dept: INTERNAL MEDICINE CLINIC | Facility: CLINIC | Age: 70
End: 2020-03-12

## 2020-03-12 NOTE — TELEPHONE ENCOUNTER
She can do her labs if she wants and she can get a simple message that labs are OK or she needs appt to make adjustments

## 2020-03-12 NOTE — TELEPHONE ENCOUNTER
Patient has a 4 month fup w/labs scheduled for 3/18/20 @ 9:00 am and really didn't want to come to the doctor's office if she isn't  Sick and wanted to know if blood work results could be discussed over the phone? Please advise        Best contact # [phone]

## 2020-03-12 NOTE — TELEPHONE ENCOUNTER
Can these labs be discussed over the phone with this patient?   Or should she come in for her follow up

## 2020-06-16 ENCOUNTER — TELEPHONE (OUTPATIENT)
Dept: INTERNAL MEDICINE CLINIC | Facility: CLINIC | Age: 70
End: 2020-06-16

## 2020-06-16 DIAGNOSIS — M81.0 AGE-RELATED OSTEOPOROSIS WITHOUT CURRENT PATHOLOGICAL FRACTURE: ICD-10-CM

## 2020-06-16 RX ORDER — IBANDRONATE SODIUM 150 MG/1
150 TABLET, FILM COATED ORAL
Qty: 3 TABLET | Refills: 1 | Status: SHIPPED | OUTPATIENT
Start: 2020-06-16 | End: 2020-06-17 | Stop reason: SDUPTHER

## 2020-06-17 DIAGNOSIS — M81.0 AGE-RELATED OSTEOPOROSIS WITHOUT CURRENT PATHOLOGICAL FRACTURE: ICD-10-CM

## 2020-06-17 RX ORDER — IBANDRONATE SODIUM 150 MG/1
150 TABLET, FILM COATED ORAL
Qty: 3 TABLET | Refills: 1 | Status: SHIPPED | OUTPATIENT
Start: 2020-06-17 | End: 2020-11-22

## 2020-07-13 DIAGNOSIS — E78.2 MIXED HYPERLIPIDEMIA: ICD-10-CM

## 2020-07-13 DIAGNOSIS — E11.9 TYPE 2 DIABETES MELLITUS WITHOUT COMPLICATION, WITHOUT LONG-TERM CURRENT USE OF INSULIN (HCC): ICD-10-CM

## 2020-07-13 DIAGNOSIS — E03.9 HYPOTHYROIDISM, ACQUIRED: ICD-10-CM

## 2020-07-13 RX ORDER — LEVOTHYROXINE SODIUM 0.03 MG/1
25 TABLET ORAL DAILY
Qty: 90 TABLET | Refills: 0 | Status: SHIPPED | OUTPATIENT
Start: 2020-07-13 | End: 2020-10-15

## 2020-07-13 RX ORDER — ROSUVASTATIN CALCIUM 10 MG/1
10 TABLET, COATED ORAL
Qty: 90 TABLET | Refills: 0 | Status: SHIPPED | OUTPATIENT
Start: 2020-07-13 | End: 2020-10-15

## 2020-07-16 ENCOUNTER — TELEPHONE (OUTPATIENT)
Dept: INTERNAL MEDICINE CLINIC | Facility: CLINIC | Age: 70
End: 2020-07-16

## 2020-07-16 DIAGNOSIS — E11.9 TYPE 2 DIABETES MELLITUS WITHOUT COMPLICATION, WITHOUT LONG-TERM CURRENT USE OF INSULIN (HCC): ICD-10-CM

## 2020-07-16 DIAGNOSIS — E03.9 HYPOTHYROIDISM, ACQUIRED: Primary | ICD-10-CM

## 2020-07-16 NOTE — TELEPHONE ENCOUNTER
Patient has an appt on 08/31 and would like to know if you want any labs done for this visit?     Call back #[phone]  Patient would like them mailed to her

## 2020-07-21 ENCOUNTER — TELEPHONE (OUTPATIENT)
Dept: OTHER | Facility: OTHER | Age: 70
End: 2020-07-21

## 2020-07-21 NOTE — TELEPHONE ENCOUNTER
Pharmacy called saying they sent over a request for all the patients diabetic supplies  They are re-faxing a request tonight

## 2020-07-24 ENCOUNTER — APPOINTMENT (OUTPATIENT)
Dept: LAB | Facility: CLINIC | Age: 70
End: 2020-07-24
Payer: COMMERCIAL

## 2020-07-24 DIAGNOSIS — E03.9 HYPOTHYROIDISM, ACQUIRED: ICD-10-CM

## 2020-07-24 DIAGNOSIS — E11.9 TYPE 2 DIABETES MELLITUS WITHOUT COMPLICATION, WITHOUT LONG-TERM CURRENT USE OF INSULIN (HCC): ICD-10-CM

## 2020-07-24 LAB
ALBUMIN SERPL BCP-MCNC: 4.6 G/DL (ref 3.5–5)
ALP SERPL-CCNC: 71 U/L (ref 46–116)
ALT SERPL W P-5'-P-CCNC: 58 U/L (ref 12–78)
ANION GAP SERPL CALCULATED.3IONS-SCNC: 7 MMOL/L (ref 4–13)
AST SERPL W P-5'-P-CCNC: 34 U/L (ref 5–45)
BASOPHILS # BLD AUTO: 0.06 THOUSANDS/ΜL (ref 0–0.1)
BASOPHILS NFR BLD AUTO: 1 % (ref 0–1)
BILIRUB SERPL-MCNC: 0.39 MG/DL (ref 0.2–1)
BUN SERPL-MCNC: 16 MG/DL (ref 5–25)
CALCIUM SERPL-MCNC: 9.2 MG/DL (ref 8.3–10.1)
CHLORIDE SERPL-SCNC: 105 MMOL/L (ref 100–108)
CHOLEST SERPL-MCNC: 125 MG/DL (ref 50–200)
CO2 SERPL-SCNC: 28 MMOL/L (ref 21–32)
CREAT SERPL-MCNC: 0.7 MG/DL (ref 0.6–1.3)
CREAT UR-MCNC: 92.1 MG/DL
EOSINOPHIL # BLD AUTO: 0.17 THOUSAND/ΜL (ref 0–0.61)
EOSINOPHIL NFR BLD AUTO: 3 % (ref 0–6)
ERYTHROCYTE [DISTWIDTH] IN BLOOD BY AUTOMATED COUNT: 14 % (ref 11.6–15.1)
EST. AVERAGE GLUCOSE BLD GHB EST-MCNC: 160 MG/DL
GFR SERPL CREATININE-BSD FRML MDRD: 90 ML/MIN/1.73SQ M
GLUCOSE P FAST SERPL-MCNC: 123 MG/DL (ref 65–99)
HBA1C MFR BLD: 7.2 %
HCT VFR BLD AUTO: 40.3 % (ref 34.8–46.1)
HDLC SERPL-MCNC: 44 MG/DL
HGB BLD-MCNC: 12.4 G/DL (ref 11.5–15.4)
IMM GRANULOCYTES # BLD AUTO: 0.01 THOUSAND/UL (ref 0–0.2)
IMM GRANULOCYTES NFR BLD AUTO: 0 % (ref 0–2)
LDLC SERPL CALC-MCNC: 63 MG/DL (ref 0–100)
LYMPHOCYTES # BLD AUTO: 1.9 THOUSANDS/ΜL (ref 0.6–4.47)
LYMPHOCYTES NFR BLD AUTO: 33 % (ref 14–44)
MCH RBC QN AUTO: 28.1 PG (ref 26.8–34.3)
MCHC RBC AUTO-ENTMCNC: 30.8 G/DL (ref 31.4–37.4)
MCV RBC AUTO: 91 FL (ref 82–98)
MICROALBUMIN UR-MCNC: 96.2 MG/L (ref 0–20)
MICROALBUMIN/CREAT 24H UR: 104 MG/G CREATININE (ref 0–30)
MONOCYTES # BLD AUTO: 0.42 THOUSAND/ΜL (ref 0.17–1.22)
MONOCYTES NFR BLD AUTO: 7 % (ref 4–12)
NEUTROPHILS # BLD AUTO: 3.16 THOUSANDS/ΜL (ref 1.85–7.62)
NEUTS SEG NFR BLD AUTO: 56 % (ref 43–75)
NONHDLC SERPL-MCNC: 81 MG/DL
NRBC BLD AUTO-RTO: 0 /100 WBCS
PLATELET # BLD AUTO: 252 THOUSANDS/UL (ref 149–390)
PMV BLD AUTO: 12.8 FL (ref 8.9–12.7)
POTASSIUM SERPL-SCNC: 4 MMOL/L (ref 3.5–5.3)
PROT SERPL-MCNC: 7.9 G/DL (ref 6.4–8.2)
RBC # BLD AUTO: 4.42 MILLION/UL (ref 3.81–5.12)
SODIUM SERPL-SCNC: 140 MMOL/L (ref 136–145)
T4 FREE SERPL-MCNC: 0.95 NG/DL (ref 0.76–1.46)
TRIGL SERPL-MCNC: 91 MG/DL
TSH SERPL DL<=0.05 MIU/L-ACNC: 3.68 UIU/ML (ref 0.36–3.74)
WBC # BLD AUTO: 5.72 THOUSAND/UL (ref 4.31–10.16)

## 2020-07-24 PROCEDURE — 80053 COMPREHEN METABOLIC PANEL: CPT

## 2020-07-24 PROCEDURE — 82043 UR ALBUMIN QUANTITATIVE: CPT

## 2020-07-24 PROCEDURE — 85025 COMPLETE CBC W/AUTO DIFF WBC: CPT

## 2020-07-24 PROCEDURE — 84443 ASSAY THYROID STIM HORMONE: CPT

## 2020-07-24 PROCEDURE — 80061 LIPID PANEL: CPT

## 2020-07-24 PROCEDURE — 83036 HEMOGLOBIN GLYCOSYLATED A1C: CPT

## 2020-07-24 PROCEDURE — 84439 ASSAY OF FREE THYROXINE: CPT

## 2020-07-24 PROCEDURE — 36415 COLL VENOUS BLD VENIPUNCTURE: CPT

## 2020-07-24 PROCEDURE — 82570 ASSAY OF URINE CREATININE: CPT

## 2020-08-31 ENCOUNTER — OFFICE VISIT (OUTPATIENT)
Dept: INTERNAL MEDICINE CLINIC | Facility: CLINIC | Age: 70
End: 2020-08-31
Payer: COMMERCIAL

## 2020-08-31 VITALS
OXYGEN SATURATION: 98 % | SYSTOLIC BLOOD PRESSURE: 122 MMHG | HEIGHT: 60 IN | DIASTOLIC BLOOD PRESSURE: 80 MMHG | TEMPERATURE: 98.2 F | BODY MASS INDEX: 27.88 KG/M2 | HEART RATE: 77 BPM | WEIGHT: 142 LBS | RESPIRATION RATE: 16 BRPM

## 2020-08-31 DIAGNOSIS — E78.2 MIXED HYPERLIPIDEMIA: ICD-10-CM

## 2020-08-31 DIAGNOSIS — E11.9 TYPE 2 DIABETES MELLITUS WITHOUT COMPLICATION, WITHOUT LONG-TERM CURRENT USE OF INSULIN (HCC): Primary | ICD-10-CM

## 2020-08-31 DIAGNOSIS — F41.9 ANXIETY: ICD-10-CM

## 2020-08-31 DIAGNOSIS — E03.9 HYPOTHYROIDISM, ACQUIRED: ICD-10-CM

## 2020-08-31 PROCEDURE — 3008F BODY MASS INDEX DOCD: CPT | Performed by: INTERNAL MEDICINE

## 2020-08-31 PROCEDURE — 3060F POS MICROALBUMINURIA REV: CPT | Performed by: INTERNAL MEDICINE

## 2020-08-31 PROCEDURE — 3051F HG A1C>EQUAL 7.0%<8.0%: CPT | Performed by: INTERNAL MEDICINE

## 2020-08-31 PROCEDURE — 1160F RVW MEDS BY RX/DR IN RCRD: CPT | Performed by: INTERNAL MEDICINE

## 2020-08-31 PROCEDURE — 1036F TOBACCO NON-USER: CPT | Performed by: INTERNAL MEDICINE

## 2020-08-31 PROCEDURE — 4040F PNEUMOC VAC/ADMIN/RCVD: CPT | Performed by: INTERNAL MEDICINE

## 2020-08-31 PROCEDURE — 99214 OFFICE O/P EST MOD 30 MIN: CPT | Performed by: INTERNAL MEDICINE

## 2020-08-31 RX ORDER — BUSPIRONE HYDROCHLORIDE 10 MG/1
10 TABLET ORAL 2 TIMES DAILY PRN
Qty: 60 TABLET | Refills: 3 | Status: SHIPPED | OUTPATIENT
Start: 2020-08-31 | End: 2021-01-04 | Stop reason: SDUPTHER

## 2020-08-31 NOTE — PROGRESS NOTES
Assessment/Plan:     Chronic problems are stable  Continue present medications  Continue diet and exercise  Ordered labs for next visit  Quality Measures: BMI Counseling: Body mass index is 27 73 kg/m²  The BMI is above normal  Nutrition recommendations include encouraging healthy choices of fruits and vegetables  Return in about 4 months (around 12/31/2020) for Regular visit and Medicare Wellness  No problem-specific Assessment & Plan notes found for this encounter  Diagnoses and all orders for this visit:    Type 2 diabetes mellitus without complication, without long-term current use of insulin (HCC)  -     Comprehensive metabolic panel; Future  -     CBC and differential; Future  -     Hemoglobin A1C; Future  -     Lipid panel; Future    Hypothyroidism, acquired  -     T4, free; Future  -     TSH, 3rd generation; Future    Mixed hyperlipidemia    Anxiety  -     busPIRone (BUSPAR) 10 mg tablet; Take 1 tablet (10 mg total) by mouth 2 (two) times a day as needed (anxiety)          Subjective:      Patient ID: Devin Herrera is a 81 y o  female  Patient comes in today for routine follow-up  She states she has been doing well during the pandemic  She is not working  Chose not to because of her medical problems  Overall, her blood work has improved during the pandemic  Her thyroid levels are now controlled  Sugar is down  Cholesterol is down  Her weight is down  She is trying to work on her diet further  Taking her medicines as directed  Denies any new complaints today  No further additions to her history        ALLERGIES:  Allergies   Allergen Reactions    Bee Venom Shortness Of Breath and Swelling    Latex Rash       CURRENT MEDICATIONS:    Current Outpatient Medications:     busPIRone (BUSPAR) 10 mg tablet, Take 1 tablet (10 mg total) by mouth 2 (two) times a day as needed (anxiety), Disp: 60 tablet, Rfl: 3    cholecalciferol (VITAMIN D3) 1,000 units tablet, Take 1,000 Units by mouth daily, Disp: , Rfl:     fluticasone (FLONASE) 50 mcg/act nasal spray, , Disp: , Rfl:     ibandronate (BONIVA) 150 MG tablet, Take 1 tablet (150 mg total) by mouth every 30 (thirty) days, Disp: 3 tablet, Rfl: 1    levothyroxine 25 mcg tablet, TAKE 1 TABLET (25 MCG TOTAL) BY MOUTH DAILY, Disp: 90 tablet, Rfl: 0    Meloxicam 5 MG CAPS, Take 5 mg by mouth, Disp: , Rfl:     metFORMIN (GLUCOPHAGE) 1000 MG tablet, TAKE 1 TABLET TWICE DAILY WITH MEALS, Disp: 180 tablet, Rfl: 0    methocarbamol (ROBAXIN) 500 mg tablet, Take 500 mg by mouth Three times daily as needed, Disp: , Rfl:     rosuvastatin (CRESTOR) 10 MG tablet, TAKE 1 TABLET (10 MG TOTAL) BY MOUTH DAILY AT BEDTIME, Disp: 90 tablet, Rfl: 0    DOCOSAHEXAENOIC ACID PO, Take 1 tablet by mouth, Disp: , Rfl:     ACTIVE PROBLEM LIST:  Patient Active Problem List   Diagnosis    Basal cell carcinoma of back    Multiple sclerosis (HCC)    Hypothyroidism, acquired    Mixed hyperlipidemia    Age related osteoporosis    Type 2 diabetes mellitus without complication, without long-term current use of insulin (HCC)       PAST MEDICAL HISTORY:  No past medical history on file      PAST SURGICAL HISTORY:  Past Surgical History:   Procedure Laterality Date    MYRINGOTOMY W/ TUBES         FAMILY HISTORY:  Family History   Problem Relation Age of Onset    Thyroid cancer Mother     Prostate cancer Father        SOCIAL HISTORY:  Social History     Socioeconomic History    Marital status: /Civil Union     Spouse name: Not on file    Number of children: Not on file    Years of education: Not on file    Highest education level: Not on file   Occupational History    Not on file   Social Needs    Financial resource strain: Not on file    Food insecurity     Worry: Not on file     Inability: Not on file    Transportation needs     Medical: Not on file     Non-medical: Not on file   Tobacco Use    Smoking status: Never Smoker    Smokeless tobacco: Never Used   Substance and Sexual Activity    Alcohol use: Not on file    Drug use: Not on file    Sexual activity: Not on file   Lifestyle    Physical activity     Days per week: Not on file     Minutes per session: Not on file    Stress: Not on file   Relationships    Social connections     Talks on phone: Not on file     Gets together: Not on file     Attends Religion service: Not on file     Active member of club or organization: Not on file     Attends meetings of clubs or organizations: Not on file     Relationship status: Not on file    Intimate partner violence     Fear of current or ex partner: Not on file     Emotionally abused: Not on file     Physically abused: Not on file     Forced sexual activity: Not on file   Other Topics Concern    Not on file   Social History Narrative    Not on file       Review of Systems   Respiratory: Negative for shortness of breath  Cardiovascular: Negative for chest pain  Gastrointestinal: Negative for abdominal pain  Objective:  Vitals:    08/31/20 0843   BP: 122/80   BP Location: Left arm   Patient Position: Sitting   Cuff Size: Standard   Pulse: 77   Resp: 16   Temp: 98 2 °F (36 8 °C)   TempSrc: Tympanic   SpO2: 98%   Weight: 64 4 kg (142 lb)   Height: 5' (1 524 m)     Body mass index is 27 73 kg/m²  Physical Exam  Vitals signs and nursing note reviewed  Constitutional:       Appearance: She is well-developed  Cardiovascular:      Rate and Rhythm: Normal rate and regular rhythm  Heart sounds: Normal heart sounds  Pulmonary:      Effort: Pulmonary effort is normal       Breath sounds: Normal breath sounds  Abdominal:      Palpations: Abdomen is soft  Tenderness: There is no abdominal tenderness  Neurological:      Mental Status: She is alert and oriented to person, place, and time             RESULTS:    Recent Results (from the past 1008 hour(s))   Comprehensive metabolic panel    Collection Time: 07/24/20  8:24 AM   Result Value Ref Range    Sodium 140 136 - 145 mmol/L    Potassium 4 0 3 5 - 5 3 mmol/L    Chloride 105 100 - 108 mmol/L    CO2 28 21 - 32 mmol/L    ANION GAP 7 4 - 13 mmol/L    BUN 16 5 - 25 mg/dL    Creatinine 0 70 0 60 - 1 30 mg/dL    Glucose, Fasting 123 (H) 65 - 99 mg/dL    Calcium 9 2 8 3 - 10 1 mg/dL    AST 34 5 - 45 U/L    ALT 58 12 - 78 U/L    Alkaline Phosphatase 71 46 - 116 U/L    Total Protein 7 9 6 4 - 8 2 g/dL    Albumin 4 6 3 5 - 5 0 g/dL    Total Bilirubin 0 39 0 20 - 1 00 mg/dL    eGFR 90 ml/min/1 73sq m   CBC and differential    Collection Time: 07/24/20  8:24 AM   Result Value Ref Range    WBC 5 72 4 31 - 10 16 Thousand/uL    RBC 4 42 3 81 - 5 12 Million/uL    Hemoglobin 12 4 11 5 - 15 4 g/dL    Hematocrit 40 3 34 8 - 46 1 %    MCV 91 82 - 98 fL    MCH 28 1 26 8 - 34 3 pg    MCHC 30 8 (L) 31 4 - 37 4 g/dL    RDW 14 0 11 6 - 15 1 %    MPV 12 8 (H) 8 9 - 12 7 fL    Platelets 994 937 - 097 Thousands/uL    nRBC 0 /100 WBCs    Neutrophils Relative 56 43 - 75 %    Immat GRANS % 0 0 - 2 %    Lymphocytes Relative 33 14 - 44 %    Monocytes Relative 7 4 - 12 %    Eosinophils Relative 3 0 - 6 %    Basophils Relative 1 0 - 1 %    Neutrophils Absolute 3 16 1 85 - 7 62 Thousands/µL    Immature Grans Absolute 0 01 0 00 - 0 20 Thousand/uL    Lymphocytes Absolute 1 90 0 60 - 4 47 Thousands/µL    Monocytes Absolute 0 42 0 17 - 1 22 Thousand/µL    Eosinophils Absolute 0 17 0 00 - 0 61 Thousand/µL    Basophils Absolute 0 06 0 00 - 0 10 Thousands/µL   Hemoglobin A1C    Collection Time: 07/24/20  8:24 AM   Result Value Ref Range    Hemoglobin A1C 7 2 (H) Normal 3 8-5 6%; PreDiabetic 5 7-6 4%;  Diabetic >=6 5%; Glycemic control for adults with diabetes <7 0% %     mg/dl   Lipid panel    Collection Time: 07/24/20  8:24 AM   Result Value Ref Range    Cholesterol 125 50 - 200 mg/dL    Triglycerides 91 <=150 mg/dL    HDL, Direct 44 >=40 mg/dL    LDL Calculated 63 0 - 100 mg/dL    Non-HDL-Chol (CHOL-HDL) 81 mg/dl Microalbumin / creatinine urine ratio    Collection Time: 07/24/20  8:24 AM   Result Value Ref Range    Creatinine, Ur 92 1 mg/dL    Microalbum  ,U,Random 96 2 (H) 0 0 - 20 0 mg/L    Microalb Creat Ratio 104 (H) 0 - 30 mg/g creatinine   T4, free    Collection Time: 07/24/20  8:24 AM   Result Value Ref Range    Free T4 0 95 0 76 - 1 46 ng/dL   TSH, 3rd generation    Collection Time: 07/24/20  8:24 AM   Result Value Ref Range    TSH 3RD GENERATON 3 680 0 358 - 3 740 uIU/mL       This note was created with voice recognition software  Phonic, grammatical and spelling errors may be present within the note as a result

## 2020-08-31 NOTE — PROGRESS NOTES
Patient's shoes and socks removed  Right Foot/Ankle   Right Foot Inspection  Skin Exam: skin normal and skin intact no dry skin, no warmth, no callus, no erythema, no maceration, no abnormal color, no pre-ulcer, no ulcer and no callus                          Toe Exam: ROM and strength within normal limits  Sensory   Vibration: intact  Proprioception: intact   Monofilament testing: intact  Vascular    The right DP pulse is 2+  Right Toe  - Comprehensive Exam  Swelling: none   Tenderness: none         Left Foot/Ankle  Left Foot Inspection  Skin Exam: skin normal and skin intactno dry skin, no warmth, no erythema, no maceration, normal color, no pre-ulcer, no ulcer and no callus                         Toe Exam: ROM and strength within normal limits                   Sensory   Vibration: intact  Proprioception: intact  Monofilament: intact  Vascular    The left DP pulse is 2+  Left Toe  - Comprehensive Exam  Ecchymosis: none  Swelling: none   Tenderness: none       Assign Risk Category:  No deformity present; No loss of protective sensation;  No weak pulses       Risk: 0

## 2020-09-01 DIAGNOSIS — E11.9 TYPE 2 DIABETES MELLITUS WITHOUT COMPLICATION, WITHOUT LONG-TERM CURRENT USE OF INSULIN (HCC): Primary | ICD-10-CM

## 2020-09-17 ENCOUNTER — CLINICAL SUPPORT (OUTPATIENT)
Dept: INTERNAL MEDICINE CLINIC | Facility: CLINIC | Age: 70
End: 2020-09-17
Payer: COMMERCIAL

## 2020-09-17 DIAGNOSIS — Z23 NEED FOR INFLUENZA VACCINATION: Primary | ICD-10-CM

## 2020-09-17 PROCEDURE — 90662 IIV NO PRSV INCREASED AG IM: CPT

## 2020-09-17 PROCEDURE — G0008 ADMIN INFLUENZA VIRUS VAC: HCPCS

## 2020-10-15 DIAGNOSIS — E11.9 TYPE 2 DIABETES MELLITUS WITHOUT COMPLICATION, WITHOUT LONG-TERM CURRENT USE OF INSULIN (HCC): ICD-10-CM

## 2020-10-15 DIAGNOSIS — E03.9 HYPOTHYROIDISM, ACQUIRED: ICD-10-CM

## 2020-10-15 DIAGNOSIS — E78.2 MIXED HYPERLIPIDEMIA: ICD-10-CM

## 2020-10-15 RX ORDER — ROSUVASTATIN CALCIUM 10 MG/1
10 TABLET, COATED ORAL
Qty: 90 TABLET | Refills: 1 | Status: SHIPPED | OUTPATIENT
Start: 2020-10-15 | End: 2021-03-10

## 2020-10-15 RX ORDER — LEVOTHYROXINE SODIUM 0.03 MG/1
25 TABLET ORAL DAILY
Qty: 90 TABLET | Refills: 1 | Status: SHIPPED | OUTPATIENT
Start: 2020-10-15 | End: 2021-01-04 | Stop reason: SDUPTHER

## 2020-11-21 DIAGNOSIS — M81.0 AGE-RELATED OSTEOPOROSIS WITHOUT CURRENT PATHOLOGICAL FRACTURE: ICD-10-CM

## 2020-11-22 RX ORDER — IBANDRONATE SODIUM 150 MG/1
TABLET, FILM COATED ORAL
Qty: 3 TABLET | Refills: 1 | Status: SHIPPED | OUTPATIENT
Start: 2020-11-22 | End: 2020-12-07 | Stop reason: SDUPTHER

## 2020-12-07 ENCOUNTER — TRANSCRIBE ORDERS (OUTPATIENT)
Dept: INTERNAL MEDICINE CLINIC | Facility: CLINIC | Age: 70
End: 2020-12-07

## 2020-12-07 DIAGNOSIS — M81.0 AGE-RELATED OSTEOPOROSIS WITHOUT CURRENT PATHOLOGICAL FRACTURE: ICD-10-CM

## 2020-12-08 RX ORDER — IBANDRONATE SODIUM 150 MG/1
150 TABLET, FILM COATED ORAL
Qty: 3 TABLET | Refills: 3 | Status: SHIPPED | OUTPATIENT
Start: 2020-12-08

## 2020-12-21 ENCOUNTER — LAB (OUTPATIENT)
Dept: LAB | Facility: CLINIC | Age: 70
End: 2020-12-21
Payer: COMMERCIAL

## 2020-12-21 DIAGNOSIS — E03.9 HYPOTHYROIDISM, ACQUIRED: ICD-10-CM

## 2020-12-21 DIAGNOSIS — E11.9 TYPE 2 DIABETES MELLITUS WITHOUT COMPLICATION, WITHOUT LONG-TERM CURRENT USE OF INSULIN (HCC): ICD-10-CM

## 2020-12-21 LAB
ALBUMIN SERPL BCP-MCNC: 4.7 G/DL (ref 3.5–5)
ALP SERPL-CCNC: 76 U/L (ref 46–116)
ALT SERPL W P-5'-P-CCNC: 85 U/L (ref 12–78)
ANION GAP SERPL CALCULATED.3IONS-SCNC: 8 MMOL/L (ref 4–13)
AST SERPL W P-5'-P-CCNC: 54 U/L (ref 5–45)
BASOPHILS # BLD AUTO: 0.07 THOUSANDS/ΜL (ref 0–0.1)
BASOPHILS NFR BLD AUTO: 1 % (ref 0–1)
BILIRUB SERPL-MCNC: 0.5 MG/DL (ref 0.2–1)
BUN SERPL-MCNC: 13 MG/DL (ref 5–25)
CALCIUM SERPL-MCNC: 9.6 MG/DL (ref 8.3–10.1)
CHLORIDE SERPL-SCNC: 108 MMOL/L (ref 100–108)
CHOLEST SERPL-MCNC: 132 MG/DL (ref 50–200)
CO2 SERPL-SCNC: 27 MMOL/L (ref 21–32)
CREAT SERPL-MCNC: 0.76 MG/DL (ref 0.6–1.3)
EOSINOPHIL # BLD AUTO: 0.18 THOUSAND/ΜL (ref 0–0.61)
EOSINOPHIL NFR BLD AUTO: 3 % (ref 0–6)
ERYTHROCYTE [DISTWIDTH] IN BLOOD BY AUTOMATED COUNT: 13.9 % (ref 11.6–15.1)
EST. AVERAGE GLUCOSE BLD GHB EST-MCNC: 151 MG/DL
GFR SERPL CREATININE-BSD FRML MDRD: 81 ML/MIN/1.73SQ M
GLUCOSE P FAST SERPL-MCNC: 129 MG/DL (ref 65–99)
HBA1C MFR BLD: 6.9 %
HCT VFR BLD AUTO: 39.8 % (ref 34.8–46.1)
HDLC SERPL-MCNC: 41 MG/DL
HGB BLD-MCNC: 12.6 G/DL (ref 11.5–15.4)
IMM GRANULOCYTES # BLD AUTO: 0.02 THOUSAND/UL (ref 0–0.2)
IMM GRANULOCYTES NFR BLD AUTO: 0 % (ref 0–2)
LDLC SERPL CALC-MCNC: 65 MG/DL (ref 0–100)
LYMPHOCYTES # BLD AUTO: 2.2 THOUSANDS/ΜL (ref 0.6–4.47)
LYMPHOCYTES NFR BLD AUTO: 32 % (ref 14–44)
MCH RBC QN AUTO: 28.3 PG (ref 26.8–34.3)
MCHC RBC AUTO-ENTMCNC: 31.7 G/DL (ref 31.4–37.4)
MCV RBC AUTO: 89 FL (ref 82–98)
MONOCYTES # BLD AUTO: 0.52 THOUSAND/ΜL (ref 0.17–1.22)
MONOCYTES NFR BLD AUTO: 7 % (ref 4–12)
NEUTROPHILS # BLD AUTO: 4 THOUSANDS/ΜL (ref 1.85–7.62)
NEUTS SEG NFR BLD AUTO: 57 % (ref 43–75)
NONHDLC SERPL-MCNC: 91 MG/DL
NRBC BLD AUTO-RTO: 0 /100 WBCS
PLATELET # BLD AUTO: 215 THOUSANDS/UL (ref 149–390)
PMV BLD AUTO: 12.7 FL (ref 8.9–12.7)
POTASSIUM SERPL-SCNC: 4 MMOL/L (ref 3.5–5.3)
PROT SERPL-MCNC: 8.1 G/DL (ref 6.4–8.2)
RBC # BLD AUTO: 4.46 MILLION/UL (ref 3.81–5.12)
SODIUM SERPL-SCNC: 143 MMOL/L (ref 136–145)
T4 FREE SERPL-MCNC: 1.01 NG/DL (ref 0.76–1.46)
TRIGL SERPL-MCNC: 129 MG/DL
TSH SERPL DL<=0.05 MIU/L-ACNC: 4.45 UIU/ML (ref 0.36–3.74)
WBC # BLD AUTO: 6.99 THOUSAND/UL (ref 4.31–10.16)

## 2020-12-21 PROCEDURE — 80061 LIPID PANEL: CPT

## 2020-12-21 PROCEDURE — 36415 COLL VENOUS BLD VENIPUNCTURE: CPT

## 2020-12-21 PROCEDURE — 80053 COMPREHEN METABOLIC PANEL: CPT

## 2020-12-21 PROCEDURE — 85025 COMPLETE CBC W/AUTO DIFF WBC: CPT

## 2020-12-21 PROCEDURE — 83036 HEMOGLOBIN GLYCOSYLATED A1C: CPT

## 2020-12-21 PROCEDURE — 84443 ASSAY THYROID STIM HORMONE: CPT

## 2020-12-21 PROCEDURE — 84439 ASSAY OF FREE THYROXINE: CPT

## 2020-12-22 ENCOUNTER — TELEPHONE (OUTPATIENT)
Dept: INTERNAL MEDICINE CLINIC | Facility: CLINIC | Age: 70
End: 2020-12-22

## 2020-12-22 DIAGNOSIS — R74.8 ABNORMAL LIVER ENZYMES: Primary | ICD-10-CM

## 2020-12-23 ENCOUNTER — TELEPHONE (OUTPATIENT)
Dept: INTERNAL MEDICINE CLINIC | Facility: CLINIC | Age: 70
End: 2020-12-23

## 2020-12-28 ENCOUNTER — TELEPHONE (OUTPATIENT)
Dept: INTERNAL MEDICINE CLINIC | Facility: CLINIC | Age: 70
End: 2020-12-28

## 2020-12-29 ENCOUNTER — LAB (OUTPATIENT)
Dept: LAB | Facility: CLINIC | Age: 70
End: 2020-12-29
Payer: COMMERCIAL

## 2020-12-29 DIAGNOSIS — R74.8 ABNORMAL LIVER ENZYMES: ICD-10-CM

## 2020-12-29 LAB
ALT SERPL W P-5'-P-CCNC: 67 U/L (ref 12–78)
AST SERPL W P-5'-P-CCNC: 36 U/L (ref 5–45)

## 2020-12-29 PROCEDURE — 36415 COLL VENOUS BLD VENIPUNCTURE: CPT

## 2020-12-29 PROCEDURE — 84460 ALANINE AMINO (ALT) (SGPT): CPT

## 2020-12-29 PROCEDURE — 84450 TRANSFERASE (AST) (SGOT): CPT

## 2021-01-04 ENCOUNTER — OFFICE VISIT (OUTPATIENT)
Dept: INTERNAL MEDICINE CLINIC | Facility: CLINIC | Age: 71
End: 2021-01-04
Payer: COMMERCIAL

## 2021-01-04 VITALS
DIASTOLIC BLOOD PRESSURE: 78 MMHG | SYSTOLIC BLOOD PRESSURE: 120 MMHG | TEMPERATURE: 97.8 F | WEIGHT: 137 LBS | HEART RATE: 101 BPM | HEIGHT: 60 IN | OXYGEN SATURATION: 97 % | BODY MASS INDEX: 26.9 KG/M2

## 2021-01-04 DIAGNOSIS — E11.9 TYPE 2 DIABETES MELLITUS WITHOUT COMPLICATION, WITHOUT LONG-TERM CURRENT USE OF INSULIN (HCC): ICD-10-CM

## 2021-01-04 DIAGNOSIS — E78.2 MIXED HYPERLIPIDEMIA: ICD-10-CM

## 2021-01-04 DIAGNOSIS — Z00.00 MEDICARE ANNUAL WELLNESS VISIT, SUBSEQUENT: Primary | ICD-10-CM

## 2021-01-04 DIAGNOSIS — G35 MULTIPLE SCLEROSIS (HCC): ICD-10-CM

## 2021-01-04 DIAGNOSIS — E03.9 HYPOTHYROIDISM, ACQUIRED: ICD-10-CM

## 2021-01-04 DIAGNOSIS — F41.9 ANXIETY: ICD-10-CM

## 2021-01-04 PROCEDURE — 1160F RVW MEDS BY RX/DR IN RCRD: CPT | Performed by: INTERNAL MEDICINE

## 2021-01-04 PROCEDURE — 3725F SCREEN DEPRESSION PERFORMED: CPT | Performed by: INTERNAL MEDICINE

## 2021-01-04 PROCEDURE — 1125F AMNT PAIN NOTED PAIN PRSNT: CPT | Performed by: INTERNAL MEDICINE

## 2021-01-04 PROCEDURE — 1036F TOBACCO NON-USER: CPT | Performed by: INTERNAL MEDICINE

## 2021-01-04 PROCEDURE — 99214 OFFICE O/P EST MOD 30 MIN: CPT | Performed by: INTERNAL MEDICINE

## 2021-01-04 PROCEDURE — 1170F FXNL STATUS ASSESSED: CPT | Performed by: INTERNAL MEDICINE

## 2021-01-04 PROCEDURE — 3008F BODY MASS INDEX DOCD: CPT | Performed by: INTERNAL MEDICINE

## 2021-01-04 PROCEDURE — 3288F FALL RISK ASSESSMENT DOCD: CPT | Performed by: INTERNAL MEDICINE

## 2021-01-04 PROCEDURE — G0439 PPPS, SUBSEQ VISIT: HCPCS | Performed by: INTERNAL MEDICINE

## 2021-01-04 RX ORDER — BUSPIRONE HYDROCHLORIDE 10 MG/1
10 TABLET ORAL 2 TIMES DAILY PRN
Qty: 60 TABLET | Refills: 3 | Status: SHIPPED | OUTPATIENT
Start: 2021-01-04 | End: 2022-06-14 | Stop reason: SDUPTHER

## 2021-01-04 RX ORDER — LEVOTHYROXINE SODIUM 0.05 MG/1
50 TABLET ORAL DAILY
Qty: 90 TABLET | Refills: 1 | Status: SHIPPED | OUTPATIENT
Start: 2021-01-04 | End: 2021-01-15 | Stop reason: SDUPTHER

## 2021-01-04 NOTE — PROGRESS NOTES
Assessment and Plan:     Problem List Items Addressed This Visit     None           Preventive health issues were discussed with patient, and age appropriate screening tests were ordered as noted in patient's After Visit Summary  Personalized health advice and appropriate referrals for health education or preventive services given if needed, as noted in patient's After Visit Summary  History of Present Illness:     Patient presents for Welcome to Medicare visit  Patient Care Team:  Clara Dick MD as PCP - General (Internal Medicine)  Dimitri Workman MD     Review of Systems:     Review of Systems   Problem List:     Patient Active Problem List   Diagnosis    Basal cell carcinoma of back    Multiple sclerosis ([facility] [address] )    Hypothyroidism, acquired    Mixed hyperlipidemia    Age related osteoporosis    Type 2 diabetes mellitus without complication, without long-term current use of insulin ([facility] )      Past Medical and Surgical History:     No past medical history on file    Past Surgical History:   Procedure Laterality Date    MYRINGOTOMY W/ TUBES        Family History:     Family History   Problem Relation Age of Onset    Thyroid cancer Mother     Prostate cancer Father       Social History:        Social History     Socioeconomic History    Marital status: /Civil Union     Spouse name: Not on file    Number of children: Not on file    Years of education: Not on file    Highest education level: Not on file   Occupational History    Not on file   Social Needs    Financial resource strain: Not on file    Food insecurity     Worry: Not on file     Inability: Not on file   [employer] needs     Medical: Not on file     Non-medical: Not on file   Tobacco Use    Smoking status: Never Smoker    Smokeless tobacco: Never Used   Substance and Sexual Activity    Alcohol use: Not on file    Drug use: Not on file    Sexual activity: Not on file   Lifestyle    Physical activity     Days per week: Not on file     Minutes per session: Not on file    Stress: Not on file   Relationships    Social connections     Talks on phone: Not on file     Gets together: Not on file     Attends Evangelical service: Not on file     Active member of club or organization: Not on file     Attends meetings of clubs or organizations: Not on file     Relationship status: Not on file    Intimate partner violence     Fear of current or ex partner: Not on file     Emotionally abused: Not on file     Physically abused: Not on file     Forced sexual activity: Not on file   Other Topics Concern    Not on file   Social History Narrative    Not on file      Medications and Allergies:     Current Outpatient Medications   Medication Sig Dispense Refill    busPIRone (BUSPAR) 10 mg tablet Take 1 tablet (10 mg total) by mouth 2 (two) times a day as needed (anxiety) 60 tablet 3    cholecalciferol (VITAMIN D3) 1,000 units tablet Take 1,000 Units by mouth daily      DOCOSAHEXAENOIC ACID PO Take 1 tablet by mouth      fluticasone (FLONASE) 50 mcg/act nasal spray       ibandronate (BONIVA) 150 MG tablet Take 1 tablet (150 mg total) by mouth every 30 (thirty) days 3 tablet 3    levothyroxine 25 mcg tablet TAKE 1 TABLET (25 MCG TOTAL) BY MOUTH DAILY 90 tablet 1    Meloxicam 5 MG CAPS Take 5 mg by mouth      metFORMIN (GLUCOPHAGE) 1000 MG tablet TAKE 1 TABLET TWICE DAILY WITH MEALS 180 tablet 1    methocarbamol (ROBAXIN) 500 mg tablet Take 500 mg by mouth Three times daily as needed      rosuvastatin (CRESTOR) 10 MG tablet TAKE 1 TABLET (10 MG TOTAL) BY MOUTH DAILY AT BEDTIME 90 tablet 1     No current facility-administered medications for this visit        Allergies   Allergen Reactions    Bee Venom Shortness Of Breath and Swelling    Latex Rash      Immunizations:     Immunization History   Administered Date(s) Administered    INFLUENZA 09/20/2016, 01/21/2018    Influenza, high dose seasonal 0 7 mL 01/25/2019, 11/13/2019, 09/17/2020    Pneumococcal Conjugate 13-Valent 12/11/2015, 06/27/2018    Pneumococcal Polysaccharide PPV23 07/01/2019    Tdap 02/02/2018    Tuberculin Skin Test-PPD Intradermal 05/17/2017      Health Maintenance:         Topic Date Due    MAMMOGRAM  11/27/2020    Colorectal Cancer Screening  04/10/2022    Hepatitis C Screening  Completed     There are no preventive care reminders to display for this patient  Medicare Screening Tests and Risk Assessments:     Bety is here for her Subsequent Wellness visit  Health Risk Assessment:   Patient rates overall health as very good  Patient feels that their physical health rating is much better  Eyesight was rated as same  Hearing was rated as slightly worse  Patient feels that their emotional and mental health rating is much better  Pain experienced in the last 7 days has been some  Patient's pain rating has been 4/10  Patient states that she has experienced no weight loss or gain in last 6 months  Depression Screening:   PHQ-2 Score: 0      Fall Risk Screening: In the past year, patient has experienced: no history of falling in past year      Urinary Incontinence Screening:   Patient has not leaked urine accidently in the last six months  Home Safety:  Patient does not have trouble with stairs inside or outside of their home  Patient has working smoke alarms and has working carbon monoxide detector  Home safety hazards include: none  Nutrition:   Current diet is Diabetic and No Added Salt  Medications:   Patient is currently taking over-the-counter supplements  OTC medications include: see medication list  Patient is able to manage medications  Activities of Daily Living (ADLs)/Instrumental Activities of Daily Living (IADLs):   Walk and transfer into and out of bed and chair?: Yes  Dress and groom yourself?: Yes    Bathe or shower yourself?: Yes    Feed yourself?  Yes  Do your laundry/housekeeping?: Yes  Manage your money, pay your bills and track your expenses?: Yes  Make your own meals?: Yes    Do your own shopping?: Yes    Previous Hospitalizations:   Any hospitalizations or ED visits within the last 12 months?: No      Advance Care Planning:   Living will: Yes    Durable POA for healthcare: Yes    Advanced directive: No      PREVENTIVE SCREENINGS      Cardiovascular Screening:    General: Screening Not Indicated and History Lipid Disorder      Diabetes Screening:     General: Screening Not Indicated and History Diabetes      Colorectal Cancer Screening:     General: Screening Current      Breast Cancer Screening:     General: Screening Current      Cervical Cancer Screening:    General: Screening Not Indicated      Osteoporosis Screening:    General: Screening Not Indicated and History Osteoporosis      Lung Cancer Screening:     General: Screening Not Indicated      Hepatitis C Screening:    General: Screening Current    No exam data present     Physical Exam:     There were no vitals taken for this visit      Physical Exam     Santos Savage MD

## 2021-01-04 NOTE — PROGRESS NOTES
Assessment and Plan:     Problem List Items Addressed This Visit     None        BMI Counseling: Body mass index is 26 76 kg/m²  The BMI is above normal  Nutrition recommendations include encouraging healthy choices of fruits and vegetables  Preventive health issues were discussed with patient, and age appropriate screening tests were ordered as noted in patient's After Visit Summary  Personalized health advice and appropriate referrals for health education or preventive services given if needed, as noted in patient's After Visit Summary  History of Present Illness:     Patient presents for [place] to Medicare visit  Patient Care Team:  Alannah Berger MD as PCP - General (Internal Medicine)  Serafin Kumari MD     Review of Systems:     Review of Systems   Respiratory: Negative for shortness of breath  Cardiovascular: Negative for chest pain  Gastrointestinal: Negative for abdominal pain  Problem List:     Patient Active Problem List   Diagnosis    Basal cell carcinoma of back    Multiple sclerosis ([facility] [address] )    Hypothyroidism, acquired    Mixed hyperlipidemia    Age related osteoporosis    Type 2 diabetes mellitus without complication, without long-term [facility]      Past Medical and Surgical History:     No past medical history on file    Past Surgical History:   Procedure Laterality Date    MYRINGOTOMY W/ TUBES        Family History:     Family History   Problem Relation Age of Onset    Thyroid cancer Mother     Prostate cancer Father       Social History:        Social History     Socioeconomic History    Marital status: /Civil Union     Spouse name: None    Number of children: None    Years of education: None    Highest education level: None   Occupational History    None   Social Needs    Financial resource strain: None    Food insecurity     Worry: None     Inability: None    Transportation needs     Medical: None     Non-medical: None   Tobacco Use    Smoking status: Never Smoker    Smokeless tobacco: Never Used   Substance and Sexual Activity    Alcohol use: None    Drug use: None    Sexual activity: None   Lifestyle    Physical activity     Days per week: None     Minutes per session: None    Stress: None   Relationships    Social connections     Talks on phone: None     Gets together: None     Attends Druze service: None     Active member of club or organization: None     Attends meetings of clubs or organizations: None     Relationship status: None    Intimate partner violence     Fear of current or ex partner: None     Emotionally abused: None     Physically abused: None     Forced sexual activity: None   Other Topics Concern    None   Social History Narrative    None      Medications and Allergies:     Current Outpatient Medications   Medication Sig Dispense Refill    busPIRone (BUSPAR) 10 mg tablet Take 1 tablet (10 mg total) by mouth 2 (two) times a day as needed (anxiety) 60 tablet 3    cholecalciferol (VITAMIN D3) 1,000 units tablet Take 1,000 Units by mouth daily      DOCOSAHEXAENOIC ACID PO Take 1 tablet by mouth      fluticasone (FLONASE) 50 mcg/act nasal spray       ibandronate (BONIVA) 150 MG tablet Take 1 tablet (150 mg total) by mouth every 30 (thirty) days 3 tablet 3    levothyroxine 25 mcg tablet TAKE 1 TABLET (25 MCG TOTAL) BY MOUTH DAILY 90 tablet 1    Meloxicam 5 MG CAPS Take 5 mg by mouth      metFORMIN (GLUCOPHAGE) 1000 MG tablet TAKE 1 TABLET TWICE DAILY WITH MEALS 180 tablet 1    methocarbamol (ROBAXIN) 500 mg tablet Take 500 mg by mouth Three times daily as needed      rosuvastatin (CRESTOR) 10 MG tablet TAKE 1 TABLET (10 MG TOTAL) BY MOUTH DAILY AT BEDTIME 90 tablet 1     No current facility-administered medications for this visit        Allergies   Allergen Reactions    Bee Venom Shortness Of Breath and Swelling    Latex Rash      Immunizations:     Immunization History   Administered Date(s) Administered    INFLUENZA 09/20/2016, 01/21/2018    Influenza, high dose seasonal 0 7 mL 01/25/2019, 11/13/2019, 09/17/2020    Pneumococcal Conjugate 13-Valent 12/11/2015, 06/27/2018    Pneumococcal Polysaccharide PPV23 07/01/2019    Tdap 02/02/2018    Tuberculin Skin Test-PPD Intradermal 05/17/2017      Health Maintenance:         Topic Date Due    MAMMOGRAM  11/27/2020    Colorectal Cancer Screening  04/10/2022    Hepatitis C Screening  Completed     There are no preventive care reminders to display for this patient  Medicare Screening Tests and Risk Assessments:     Bety is here for her Subsequent Wellness visit  Health Risk Assessment:   Patient rates overall health as very good  Patient feels that their physical health rating is same  Eyesight was rated as same  Hearing was rated as same  Patient feels that their emotional and mental health rating is much better  Pain experienced in the last 7 days has been some  Patient's pain rating has been 5/10  Patient states that she has experienced no weight loss or gain in last 6 months  Depression Screening:   PHQ-2 Score: 0      Fall Risk Screening: In the past year, patient has experienced: no history of falling in past year      Urinary Incontinence Screening:   Patient has not leaked urine accidently in the last six months  Home Safety:  Patient does not have trouble with stairs inside or outside of their home  Patient has working smoke alarms and has working carbon monoxide detector  Home safety hazards include: none  Nutrition:   Current diet is Regular and Low Saturated Fat  Medications:   Patient is currently taking over-the-counter supplements  OTC medications include: see medication list  Patient is able to manage medications       Activities of Daily Living (ADLs)/Instrumental Activities of Daily Living (IADLs):   Walk and transfer into and out of bed and chair?: Yes  Dress and groom yourself?: Yes    Bathe or shower yourself?: Yes    Feed yourself? Yes  Do your laundry/housekeeping?: Yes  Manage your money, pay your bills and track your expenses?: Yes  Make your own meals?: Yes    Do your own shopping?: Yes    Previous Hospitalizations:   Any hospitalizations or ED visits within the last 12 months?: No      Advance Care Planning:   Living will: Yes    Durable POA for healthcare: Yes    Advanced directive: No    Advanced directive counseling given: Yes      Cognitive Screening:   Provider or family/friend/caregiver concerned regarding cognition?: No    PREVENTIVE SCREENINGS      Cardiovascular Screening:    General: Screening Not Indicated and History Lipid Disorder      Diabetes Screening:     General: Screening Not Indicated and History Diabetes      Colorectal Cancer Screening:     General: Screening Current      Breast Cancer Screening:     General: Screening Current      Cervical Cancer Screening:    General: Screening Not Indicated      Osteoporosis Screening:    General: Screening Not Indicated and History Osteoporosis      Abdominal Aortic Aneurysm (AAA) Screening:        General: Screening Not Indicated      Lung Cancer Screening:     General: Screening Not Indicated      Hepatitis C Screening:    General: Screening Current    No exam data present     Physical Exam:     /78 (BP Location: Left arm, Patient Position: Sitting, Cuff Size: Adult)   Pulse 101   Temp 97 8 °F (36 6 °C) (Temporal)   Ht 5' (1 524 m)   Wt 62 1 kg (137 lb)   SpO2 97%   BMI 26 76 kg/m²     Physical Exam  Vitals signs and nursing note reviewed  Constitutional:       Appearance: She is well-developed  Neurological:      Mental Status: She is alert and oriented to person, place, and time  Psychiatric:         Behavior: Behavior normal          Thought Content:  Thought content normal          Judgment: Judgment normal           Clara Dick MD

## 2021-01-04 NOTE — PATIENT INSTRUCTIONS
Medicare Preventive Visit Patient Instructions  Thank you for completing your Welcome to Medicare Visit or Medicare Annual Wellness Visit today  Your next wellness visit will be due in one year (1/4/2022)  The screening/preventive services that you may require over the next 5-10 years are detailed below  Some tests may not apply to you based off risk factors and/or age  Screening tests ordered at today's visit but not completed yet may show as past due  Also, please note that scanned in results may not display below  Preventive Screenings:  Service Recommendations Previous Testing/Comments   Colorectal Cancer Screening  * Colonoscopy    * Fecal Occult Blood Test (FOBT)/Fecal Immunochemical Test (FIT)  * Fecal DNA/Cologuard Test  * Flexible Sigmoidoscopy Age: 54-65 years old   Colonoscopy: every 10 years (may be performed more frequently if at higher risk)  OR  FOBT/FIT: every 1 year  OR  Cologuard: every 3 years  OR  Sigmoidoscopy: every 5 years  Screening may be recommended earlier than age 48 if at higher risk for colorectal cancer  Also, an individualized decision between you and your healthcare provider will decide whether screening between the ages of 74-80 would be appropriate  Colonoscopy: Not on file  FOBT/FIT: Not on file  Cologuard: 04/10/2019  Sigmoidoscopy: Not on file    Screening Current     Breast Cancer Screening Age: 36 years old  Frequency: every 1-2 years  Not required if history of left and right mastectomy Mammogram: 11/27/2019    Screening Current   Cervical Cancer Screening Between the ages of 21-29, pap smear recommended once every 3 years  Between the ages of 33-67, can perform pap smear with HPV co-testing every 5 years     Recommendations may differ for women with a history of total hysterectomy, cervical cancer, or abnormal pap smears in past  Pap Smear: Not on file    Screening Not Indicated   Hepatitis C Screening Once for adults born between 1945 and 1965  More frequently in patients at high risk for Hepatitis C Hep C Antibody: 01/28/2019    Screening Current   Diabetes Screening 1-2 times per year if you're at risk for diabetes or have pre-diabetes Fasting glucose: 129 mg/dL   A1C: 6 9 %    Screening Not Indicated  History Diabetes   Cholesterol Screening Once every 5 years if you don't have a lipid disorder  May order more often based on risk factors  Lipid panel: 12/21/2020    Screening Not Indicated  History Lipid Disorder     Other Preventive Screenings Covered by Medicare:  1  Abdominal Aortic Aneurysm (AAA) Screening: covered once if your at risk  You're considered to be at risk if you have a family history of AAA  2  Lung Cancer Screening: covers low dose CT scan once per year if you meet all of the following conditions: (1) Age 50-69; (2) No signs or symptoms of lung cancer; (3) Current smoker or have quit smoking within the last 15 years; (4) You have a tobacco smoking history of at least 30 pack years (packs per day multiplied by number of years you smoked); (5) You get a written order from a healthcare provider  3  Glaucoma Screening: covered annually if you're considered high risk: (1) You have diabetes OR (2) Family history of glaucoma OR (3)  aged 48 and older OR (3)  American aged 72 and older  3  Osteoporosis Screening: covered every 2 years if you meet one of the following conditions: (1) You're estrogen deficient and at risk for osteoporosis based off medical history and other findings; (2) Have a vertebral abnormality; (3) On glucocorticoid therapy for more than 3 months; (4) Have primary hyperparathyroidism; (5) On osteoporosis medications and need to assess response to drug therapy  · Last bone density test (DXA Scan): 12/10/2018  5  HIV Screening: covered annually if you're between the age of 12-76  Also covered annually if you are younger than 13 and older than 72 with risk factors for HIV infection   For pregnant patients, it is covered up to 3 times per pregnancy  Immunizations:  Immunization Recommendations   Influenza Vaccine Annual influenza vaccination during flu season is recommended for all persons aged >= 6 months who do not have contraindications   Pneumococcal Vaccine (Prevnar and Pneumovax)  * Prevnar = PCV13  * Pneumovax = PPSV23   Adults 25-60 years old: 1-3 doses may be recommended based on certain risk factors  Adults 72 years old: Prevnar (PCV13) vaccine recommended followed by Pneumovax (PPSV23) vaccine  If already received PPSV23 since turning 65, then PCV13 recommended at least one year after PPSV23 dose  Hepatitis B Vaccine 3 dose series if at intermediate or high risk (ex: diabetes, end stage renal disease, liver disease)   Tetanus (Td) Vaccine - COST NOT COVERED BY MEDICARE PART B Following completion of primary series, a booster dose should be given every 10 years to maintain immunity against tetanus  Td may also be given as tetanus wound prophylaxis  Tdap Vaccine - COST NOT COVERED BY MEDICARE PART B Recommended at least once for all adults  For pregnant patients, recommended with each pregnancy  Shingles Vaccine (Shingrix) - COST NOT COVERED BY MEDICARE PART B  2 shot series recommended in those aged 48 and above     Health Maintenance Due:      Topic Date Due    MAMMOGRAM  11/27/2020    Colorectal Cancer Screening  04/10/2022    Hepatitis C Screening  Completed     Immunizations Due:  There are no preventive care reminders to display for this patient  Advance Directives   What are advance directives? Advance directives are legal documents that state your wishes and plans for medical care  These plans are made ahead of time in case you lose your ability to make decisions for yourself  Advance directives can apply to any medical decision, such as the treatments you want, and if you want to donate organs  What are the types of advance directives?   There are many types of advance directives, and each state has rules about how to use them  You may choose a combination of any of the following:  · Living will: This is a written record of the treatment you want  You can also choose which treatments you do not want, which to limit, and which to stop at a certain time  This includes surgery, medicine, IV fluid, and tube feedings  · Durable power of  for healthcare [place] [facility]): This is a written record that states who you want to make healthcare choices for you when you are unable to make them for yourself  This person, called a proxy, is usually a family member or a friend  You may choose more than 1 proxy  · Do not resuscitate (DNR) order:  A DNR order is used in case your heart stops beating or you stop breathing  It is a request not to have certain forms of treatment, such as CPR  A DNR order may be included in other types of advance directives  · Medical directive: This covers the care that you want if you are in a coma, near death, or unable to make decisions for yourself  You can list the treatments you want for each condition  Treatment may include pain medicine, surgery, blood transfusions, dialysis, IV or tube feedings, and a ventilator (breathing machine)  · Values history: This document has questions about your views, beliefs, and how you feel and think about life  This information can help others choose the care that you would choose  Why are advance directives important? An advance directive helps you control your care  Although spoken wishes may be used, it is better to have your wishes written down  Spoken wishes can be misunderstood, or not followed  Treatments may be given even if you do not want them  An advance directive may make it easier for your family to make difficult choices about your care     Weight Management   Why it is important to manage your weight:  Being overweight increases your risk of health conditions such as heart disease, high blood pressure, type 2 diabetes, and certain types of cancer  It can also increase your risk for osteoarthritis, sleep apnea, and other respiratory problems  Aim for a slow, steady weight loss  Even a small amount of weight loss can lower your risk of health problems  How to lose weight safely:  A safe and healthy way to lose weight is to eat fewer calories and get regular exercise  You can lose up about 1 pound a week by decreasing the number of calories you eat by 500 calories each day  Healthy meal plan for weight management:  A healthy meal plan includes a variety of foods, contains fewer calories, and helps you stay healthy  A healthy meal plan includes the following:  · Eat whole-grain foods more often  A healthy meal plan should contain fiber  Fiber is the part of grains, fruits, and vegetables that is not broken down by your body  Whole-grain foods are healthy and provide extra fiber in your diet  Some examples of whole-grain foods are whole-wheat breads and pastas, oatmeal, brown rice, and bulgur  · Eat a variety of vegetables every day  Include dark, leafy greens such as spinach, kale, keli greens, and mustard greens  Eat yellow and orange vegetables such as carrots, sweet potatoes, and winter squash  · Eat a variety of fruits every day  Choose fresh or canned fruit (canned in its own juice or light syrup) instead of juice  Fruit juice has very little or no fiber  · Eat low-fat dairy foods  Drink fat-free (skim) milk or 1% milk  Eat fat-free yogurt and low-fat cottage cheese  Try low-fat cheeses such as mozzarella and other reduced-fat cheeses  · Choose meat and other protein foods that are low in fat  Choose beans or other legumes such as split peas or lentils  Choose fish, skinless poultry (chicken or turkey), or lean cuts of red meat (beef or pork)  Before you cook meat or poultry, cut off any visible fat  · Use less fat and oil  Try baking foods instead of frying them   Add less fat, such as margarine, sour cream, regular salad dressing and mayonnaise to foods  Eat fewer high-fat foods  Some examples of high-fat foods include french fries, doughnuts, ice cream, and cakes  · Eat fewer sweets  Limit foods and drinks that are high in sugar  This includes candy, cookies, regular soda, and sweetened drinks  Exercise:  Exercise at least 30 minutes per day on most days of the week  Some examples of exercise include walking, biking, dancing, and swimming  You can also fit in more physical activity by taking the stairs instead of the elevator or parking farther away from stores  Ask your healthcare provider about the best exercise plan for you  © Copyright [employer] 2018 Information is for End User's use only and may not be sold, redistributed or otherwise used for commercial purposes  All illustrations and images included in CareNotes® are the copyrighted property of [employer]  or [facility] Preventive Visit Patient Instructions  Thank you for completing your Welcome to Medicare Visit or Medicare Annual Wellness Visit today  Your next wellness visit will be due in one year (1/4/2022)  The screening/preventive services that you may require over the next 5-10 years are detailed below  Some tests may not apply to you based off risk factors and/or age  Screening tests ordered at today's visit but not completed yet may show as past due  Also, please note that scanned in results may not display below    Preventive Screenings:  Service Recommendations Previous Testing/Comments   Colorectal Cancer Screening  * Colonoscopy    * Fecal Occult Blood Test (FOBT)/Fecal Immunochemical Test (FIT)  * Fecal DNA/Cologuard Test  * Flexible Sigmoidoscopy Age: 54-65 years old   Colonoscopy: every 10 years (may be performed more frequently if at higher risk)  OR  FOBT/FIT: every 1 year  OR  Cologuard: every 3 years  OR  Sigmoidoscopy: every 5 years  Screening may be recommended earlier than age 48 if at higher risk for colorectal cancer  Also, an individualized decision between you and your healthcare provider will decide whether screening between the ages of 74-80 would be appropriate  Colonoscopy: Not on file  FOBT/FIT: Not on file  Cologuard: 04/10/2019  Sigmoidoscopy: Not on file    Screening Current     Breast Cancer Screening Age: 36 years old  Frequency: every 1-2 years  Not required if history of left and right mastectomy Mammogram: 11/27/2019    Screening Current   Cervical Cancer Screening Between the ages of 21-29, pap smear recommended once every 3 years  Between the ages of 33-67, can perform pap smear with HPV co-testing every 5 years  Recommendations may differ for women with a history of total hysterectomy, cervical cancer, or abnormal pap smears in past  Pap Smear: Not on file    Screening Not Indicated   Hepatitis C Screening Once for adults born between 1945 and 1965  More frequently in patients at high risk for Hepatitis C Hep C Antibody: 01/28/2019    Screening Current   Diabetes Screening 1-2 times per year if you're at risk for diabetes or have pre-diabetes Fasting glucose: 129 mg/dL   A1C: 6 9 %    Screening Not Indicated  History Diabetes   Cholesterol Screening Once every 5 years if you don't have a lipid disorder  May order more often based on risk factors  Lipid panel: 12/21/2020    Screening Not Indicated  History Lipid Disorder     Other Preventive Screenings Covered by Medicare:  6  Abdominal Aortic Aneurysm (AAA) Screening: covered once if your at risk  You're considered to be at risk if you have a family history of AAA    7  Lung Cancer Screening: covers low dose CT scan once per year if you meet all of the following conditions: (1) Age 50-69; (2) No signs or symptoms of lung cancer; (3) Current smoker or have quit smoking within the last 15 years; (4) You have a tobacco smoking history of at least 30 pack years (packs per day multiplied by number of years you smoked); (5) You get a written order from a healthcare provider  8  Glaucoma Screening: covered annually if you're considered high risk: (1) You have diabetes OR (2) Family history of glaucoma OR (3)  aged 48 and older OR (3)  American aged 72 and older  5  Osteoporosis Screening: covered every 2 years if you meet one of the following conditions: (1) You're estrogen deficient and at risk for osteoporosis based off medical history and other findings; (2) Have a vertebral abnormality; (3) On glucocorticoid therapy for more than 3 months; (4) Have primary hyperparathyroidism; (5) On osteoporosis medications and need to assess response to drug therapy  · Last bone density test (DXA Scan): 12/10/2018  10  HIV Screening: covered annually if you're between the age of 12-76  Also covered annually if you are younger than 13 and older than 72 with risk factors for HIV infection  For pregnant patients, it is covered up to 3 times per pregnancy  Immunizations:  Immunization Recommendations   Influenza Vaccine Annual influenza vaccination during flu season is recommended for all persons aged >= 6 months who do not have contraindications   Pneumococcal Vaccine (Prevnar and Pneumovax)  * Prevnar = PCV13  * Pneumovax = PPSV23   Adults 25-60 years old: 1-3 doses may be recommended based on certain risk factors  Adults 72 years old: Prevnar (PCV13) vaccine recommended followed by Pneumovax (PPSV23) vaccine  If already received PPSV23 since turning 65, then PCV13 recommended at least one year after PPSV23 dose  Hepatitis B Vaccine 3 dose series if at intermediate or high risk (ex: diabetes, end stage renal disease, liver disease)   Tetanus (Td) Vaccine - COST NOT COVERED BY MEDICARE PART B Following completion of primary series, a booster dose should be given every 10 years to maintain immunity against tetanus  Td may also be given as tetanus wound prophylaxis     Tdap Vaccine - COST NOT COVERED BY MEDICARE PART B Recommended at least once for all adults  For pregnant patients, recommended with each pregnancy  Shingles Vaccine (Shingrix) - COST NOT COVERED BY MEDICARE PART B  2 shot series recommended in those aged 48 and above     Health Maintenance Due:      Topic Date Due    MAMMOGRAM  11/27/2020    Colorectal Cancer Screening  04/10/2022    Hepatitis C Screening  Completed     Immunizations Due:  There are no preventive care reminders to display for this patient  Advance Directives   What are advance directives? Advance directives are legal documents that state your wishes and plans for medical care  These plans are made ahead of time in case you lose your ability to make decisions for yourself  Advance directives can apply to any medical decision, such as the treatments you want, and if you want to donate organs  What are the types of advance directives? There are many types of advance directives, and each state has rules about how to use them  You may choose a combination of any of the following:  · Living will: This is a written record of the treatment you want  You can also choose which treatments you do not want, which to limit, and which to stop at a certain time  This includes surgery, medicine, IV fluid, and tube feedings  · Durable power of  for healthcare [place] [facility]): This is a written record that states who you want to make healthcare choices for you when you are unable to make them for yourself  This person, called a proxy, is usually a family member or a friend  You may choose more than 1 proxy  · Do not resuscitate (DNR) order:  A DNR order is used in case your heart stops beating or you stop breathing  It is a request not to have certain forms of treatment, such as CPR  A DNR order may be included in other types of advance directives  · Medical directive: This covers the care that you want if you are in a coma, near death, or unable to make decisions for yourself   You can list the treatments you want for each condition  Treatment may include pain medicine, surgery, blood transfusions, dialysis, IV or tube feedings, and a ventilator (breathing machine)  · Values history: This document has questions about your views, beliefs, and how you feel and think about life  This information can help others choose the care that you would choose  Why are advance directives important? An advance directive helps you control your care  Although spoken wishes may be used, it is better to have your wishes written down  Spoken wishes can be misunderstood, or not followed  Treatments may be given even if you do not want them  An advance directive may make it easier for your family to make difficult choices about your care  Weight Management   Why it is important to manage your weight:  Being overweight increases your risk of health conditions such as heart disease, high blood pressure, type 2 diabetes, and certain types of cancer  It can also increase your risk for osteoarthritis, sleep apnea, and other respiratory problems  Aim for a slow, steady weight loss  Even a small amount of weight loss can lower your risk of health problems  How to lose weight safely:  A safe and healthy way to lose weight is to eat fewer calories and get regular exercise  You can lose up about 1 pound a week by decreasing the number of calories you eat by 500 calories each day  Healthy meal plan for weight management:  A healthy meal plan includes a variety of foods, contains fewer calories, and helps you stay healthy  A healthy meal plan includes the following:  · Eat whole-grain foods more often  A healthy meal plan should contain fiber  Fiber is the part of grains, fruits, and vegetables that is not broken down by your body  Whole-grain foods are healthy and provide extra fiber in your diet  Some examples of whole-grain foods are whole-wheat breads and pastas, oatmeal, brown rice, and bulgur  · Eat a variety of vegetables every day    Include dark, leafy greens such as spinach, kale, keli greens, and mustard greens  Eat yellow and orange vegetables such as carrots, sweet potatoes, and winter squash  · Eat a variety of fruits every day  Choose fresh or canned fruit (canned in its own juice or light syrup) instead of juice  Fruit juice has very little or no fiber  · Eat low-fat dairy foods  Drink fat-free (skim) milk or 1% milk  Eat fat-free yogurt and low-fat cottage cheese  Try low-fat cheeses such as mozzarella and other reduced-fat cheeses  · Choose meat and other protein foods that are low in fat  Choose beans or other legumes such as split peas or lentils  Choose fish, skinless poultry (chicken or turkey), or lean cuts of red meat (beef or pork)  Before you cook meat or poultry, cut off any visible fat  · Use less fat and oil  Try baking foods instead of frying them  Add less fat, such as margarine, sour cream, regular salad dressing and mayonnaise to foods  Eat fewer high-fat foods  Some examples of high-fat foods include french fries, doughnuts, ice cream, and cakes  · Eat fewer sweets  Limit foods and drinks that are high in sugar  This includes candy, cookies, regular soda, and sweetened drinks  Exercise:  Exercise at least 30 minutes per day on most days of the week  Some examples of exercise include walking, biking, dancing, and swimming  You can also fit in more physical activity by taking the stairs instead of the elevator or parking farther away from stores  Ask your healthcare provider about the best exercise plan for you  © Copyright [employer] 2018 Information is for End User's use only and may not be sold, redistributed or otherwise used for commercial purposes   All illustrations and images included in CareNotes® are the copyrighted property of [employer]  or [facility] Preventive Visit Patient Instructions  Thank you for completing your Welcome to Medicare Visit or Medicare Annual Wellness Visit today  Your next wellness visit will be due in one year (1/4/2022)  The screening/preventive services that you may require over the next 5-10 years are detailed below  Some tests may not apply to you based off risk factors and/or age  Screening tests ordered at today's visit but not completed yet may show as past due  Also, please note that scanned in results may not display below  Preventive Screenings:  Service Recommendations Previous Testing/Comments   Colorectal Cancer Screening  * Colonoscopy    * Fecal Occult Blood Test (FOBT)/Fecal Immunochemical Test (FIT)  * Fecal DNA/Cologuard Test  * Flexible Sigmoidoscopy Age: 54-65 years old   Colonoscopy: every 10 years (may be performed more frequently if at higher risk)  OR  FOBT/FIT: every 1 year  OR  Cologuard: every 3 years  OR  Sigmoidoscopy: every 5 years  Screening may be recommended earlier than age 48 if at higher risk for colorectal cancer  Also, an individualized decision between you and your healthcare provider will decide whether screening between the ages of 74-80 would be appropriate  Colonoscopy: Not on file  FOBT/FIT: Not on file  Cologuard: 04/10/2019  Sigmoidoscopy: Not on file    Screening Current     Breast Cancer Screening Age: 36 years old  Frequency: every 1-2 years  Not required if history of left and right mastectomy Mammogram: 11/27/2019    Screening Current   Cervical Cancer Screening Between the ages of 21-29, pap smear recommended once every 3 years  Between the ages of 33-67, can perform pap smear with HPV co-testing every 5 years     Recommendations may differ for women with a history of total hysterectomy, cervical cancer, or abnormal pap smears in past  Pap Smear: Not on file    Screening Not Indicated   Hepatitis C Screening Once for adults born between 1945 and 1965  More frequently in patients at high risk for Hepatitis C Hep C Antibody: 01/28/2019    Screening Current   Diabetes Screening 1-2 times per year if you're at risk for diabetes or have pre-diabetes Fasting glucose: 129 mg/dL   A1C: 6 9 %    Screening Not Indicated  History Diabetes   Cholesterol Screening Once every 5 years if you don't have a lipid disorder  May order more often based on risk factors  Lipid panel: 12/21/2020    Screening Not Indicated  History Lipid Disorder     Other Preventive Screenings Covered by Medicare:  11  Abdominal Aortic Aneurysm (AAA) Screening: covered once if your at risk  You're considered to be at risk if you have a family history of AAA  12  Lung Cancer Screening: covers low dose CT scan once per year if you meet all of the following conditions: (1) Age 50-69; (2) No signs or symptoms of lung cancer; (3) Current smoker or have quit smoking within the last 15 years; (4) You have a tobacco smoking history of at least 30 pack years (packs per day multiplied by number of years you smoked); (5) You get a written order from a healthcare provider  15  Glaucoma Screening: covered annually if you're considered high risk: (1) You have diabetes OR (2) Family history of glaucoma OR (3)  aged 48 and older OR (3)  American aged 72 and older  15  Osteoporosis Screening: covered every 2 years if you meet one of the following conditions: (1) You're estrogen deficient and at risk for osteoporosis based off medical history and other findings; (2) Have a vertebral abnormality; (3) On glucocorticoid therapy for more than 3 months; (4) Have primary hyperparathyroidism; (5) On osteoporosis medications and need to assess response to drug therapy  · Last bone density test (DXA Scan): 12/10/2018  15  HIV Screening: covered annually if you're between the age of 12-76  Also covered annually if you are younger than 13 and older than 72 with risk factors for HIV infection  For pregnant patients, it is covered up to 3 times per pregnancy      Immunizations:  Immunization Recommendations   Influenza Vaccine Annual influenza vaccination during flu season is recommended for all persons aged >= 6 months who do not have contraindications   Pneumococcal Vaccine (Prevnar and Pneumovax)  * Prevnar = PCV13  * Pneumovax = PPSV23   Adults 25-60 years old: 1-3 doses may be recommended based on certain risk factors  Adults 72 years old: Prevnar (PCV13) vaccine recommended followed by Pneumovax (PPSV23) vaccine  If already received PPSV23 since turning 65, then PCV13 recommended at least one year after PPSV23 dose  Hepatitis B Vaccine 3 dose series if at intermediate or high risk (ex: diabetes, end stage renal disease, liver disease)   Tetanus (Td) Vaccine - COST NOT COVERED BY MEDICARE PART B Following completion of primary series, a booster dose should be given every 10 years to maintain immunity against tetanus  Td may also be given as tetanus wound prophylaxis  Tdap Vaccine - COST NOT COVERED BY MEDICARE PART B Recommended at least once for all adults  For pregnant patients, recommended with each pregnancy  Shingles Vaccine (Shingrix) - COST NOT COVERED BY MEDICARE PART B  2 shot series recommended in those aged 48 and above     Health Maintenance Due:      Topic Date Due    MAMMOGRAM  11/27/2020    Colorectal Cancer Screening  04/10/2022    Hepatitis C Screening  Completed     Immunizations Due:  There are no preventive care reminders to display for this patient  Advance Directives   What are advance directives? Advance directives are legal documents that state your wishes and plans for medical care  These plans are made ahead of time in case you lose your ability to make decisions for yourself  Advance directives can apply to any medical decision, such as the treatments you want, and if you want to donate organs  What are the types of advance directives? There are many types of advance directives, and each state has rules about how to use them  You may choose a combination of any of the following:  · Living will:   This is a written record of the treatment you want  You can also choose which treatments you do not want, which to limit, and which to stop at a certain time  This includes surgery, medicine, IV fluid, and tube feedings  · Durable power of  for healthcare [facility]): This is a written record that states who you want to make healthcare choices for you when you are unable to make them for yourself  This person, called a proxy, is usually a family member or a friend  You may choose more than 1 proxy  · Do not resuscitate (DNR) order:  A DNR order is used in case your heart stops beating or you stop breathing  It is a request not to have certain forms of treatment, such as CPR  A DNR order may be included in other types of advance directives  · Medical directive: This covers the care that you want if you are in a coma, near death, or unable to make decisions for yourself  You can list the treatments you want for each condition  Treatment may include pain medicine, surgery, blood transfusions, dialysis, IV or tube feedings, and a ventilator (breathing machine)  · Values history: This document has questions about your views, beliefs, and how you feel and think about life  This information can help others choose the care that you would choose  Why are advance directives important? An advance directive helps you control your care  Although spoken wishes may be used, it is better to have your wishes written down  Spoken wishes can be misunderstood, or not followed  Treatments may be given even if you do not want them  An advance directive may make it easier for your family to make difficult choices about your care  Weight Management   Why it is important to manage your weight:  Being overweight increases your risk of health conditions such as heart disease, high blood pressure, type 2 diabetes, and certain types of cancer  It can also increase your risk for osteoarthritis, sleep apnea, and other respiratory problems   Aim for a slow, steady weight loss  Even a small amount of weight loss can lower your risk of health problems  How to lose weight safely:  A safe and healthy way to lose weight is to eat fewer calories and get regular exercise  You can lose up about 1 pound a week by decreasing the number of calories you eat by 500 calories each day  Healthy meal plan for weight management:  A healthy meal plan includes a variety of foods, contains fewer calories, and helps you stay healthy  A healthy meal plan includes the following:  · Eat whole-grain foods more often  A healthy meal plan should contain fiber  Fiber is the part of grains, fruits, and vegetables that is not broken down by your body  Whole-grain foods are healthy and provide extra fiber in your diet  Some examples of whole-grain foods are whole-wheat breads and pastas, oatmeal, brown rice, and bulgur  · Eat a variety of vegetables every day  Include dark, leafy greens such as spinach, kale, keli greens, and mustard greens  Eat yellow and orange vegetables such as carrots, sweet potatoes, and winter squash  · Eat a variety of fruits every day  Choose fresh or canned fruit (canned in its own juice or light syrup) instead of juice  Fruit juice has very little or no fiber  · Eat low-fat dairy foods  Drink fat-free (skim) milk or 1% milk  Eat fat-free yogurt and low-fat cottage cheese  Try low-fat cheeses such as mozzarella and other reduced-fat cheeses  · Choose meat and other protein foods that are low in fat  Choose beans or other legumes such as split peas or lentils  Choose fish, skinless poultry (chicken or turkey), or lean cuts of red meat (beef or pork)  Before you cook meat or poultry, cut off any visible fat  · Use less fat and oil  Try baking foods instead of frying them  Add less fat, such as margarine, sour cream, regular salad dressing and mayonnaise to foods  Eat fewer high-fat foods   Some examples of high-fat foods include french fries, doughnuts, ice cream, and cakes  · Eat fewer sweets  Limit foods and drinks that are high in sugar  This includes candy, cookies, regular soda, and sweetened drinks  Exercise:  Exercise at least 30 minutes per day on most days of the week  Some examples of exercise include walking, biking, dancing, and swimming  You can also fit in more physical activity by taking the stairs instead of the elevator or parking farther away from stores  Ask your healthcare provider about the best exercise plan for you  © Copyright [employer] 2018 Information is for End User's use only and may not be sold, redistributed or otherwise used for commercial purposes   All illustrations and images included in CareNotes® are the copyrighted property of [employer]  or [address] [place] [address]

## 2021-01-04 NOTE — PROGRESS NOTES
Assessment/Plan:      chronic problems appear stable except for the thyroid  Will adjust the dose and order repeat labs in about 6 weeks  Continue other medications  Continue diet and exercise  Quality Measures:       Return in about 4 months (around 5/4/2021)  No problem-specific Assessment & Plan notes found for this encounter  Diagnoses and all orders for this visit:    Medicare annual wellness visit, subsequent    Hypothyroidism, acquired  -     levothyroxine 50 mcg tablet; Take 1 tablet (50 mcg total) by mouth daily  -     T4, free; Future  -     TSH, 3rd generation; Future    Anxiety  -     busPIRone (BUSPAR) 10 mg tablet; Take 1 tablet (10 mg total) by mouth 2 (two) times a day as needed (anxiety)    Type 2 diabetes mellitus without complication, without long-term current use of insulin (HCC)    Mixed hyperlipidemia    Multiple sclerosis (HCC)          Subjective:      Patient ID: Alec Dancer is a 78 y o  female  Patient comes in today for routine follow-up and Medicare wellness  She states she is doing okay  Pretty much staying at home during the pandemic  Her blood work is overall looking good  Sugars are controlled  Cholesterol is controlled  Her blood pressure is controlled  TSH is still a little elevated with a low normal T4  She is taking her medicine as directed  Denies any new complaints today  No further additions to her history  Trying to do better with her diet, even during the pandemic        ALLERGIES:  Allergies   Allergen Reactions    Bee Venom Shortness Of Breath and Swelling    Latex Rash       CURRENT MEDICATIONS:    Current Outpatient Medications:     busPIRone (BUSPAR) 10 mg tablet, Take 1 tablet (10 mg total) by mouth 2 (two) times a day as needed (anxiety), Disp: 60 tablet, Rfl: 3    cholecalciferol (VITAMIN D3) 1,000 units tablet, Take 1,000 Units by mouth daily, Disp: , Rfl:     DOCOSAHEXAENOIC ACID PO, Take 1 tablet by mouth, Disp: , Rfl:    fluticasone (FLONASE) 50 mcg/act nasal spray, , Disp: , Rfl:     ibandronate (BONIVA) 150 MG tablet, Take 1 tablet (150 mg total) by mouth every 30 (thirty) days, Disp: 3 tablet, Rfl: 3    levothyroxine 50 mcg tablet, Take 1 tablet (50 mcg total) by mouth daily, Disp: 90 tablet, Rfl: 1    Meloxicam 5 MG CAPS, Take 5 mg by mouth, Disp: , Rfl:     metFORMIN (GLUCOPHAGE) 1000 MG tablet, TAKE 1 TABLET TWICE DAILY WITH MEALS, Disp: 180 tablet, Rfl: 1    methocarbamol (ROBAXIN) 500 mg tablet, Take 500 mg by mouth Three times daily as needed, Disp: , Rfl:     rosuvastatin (CRESTOR) 10 MG tablet, TAKE 1 TABLET (10 MG TOTAL) BY MOUTH DAILY AT BEDTIME, Disp: 90 tablet, Rfl: 1    ACTIVE PROBLEM LIST:  Patient Active Problem List   Diagnosis    Basal cell carcinoma of back    Multiple sclerosis (HCC)    Hypothyroidism, acquired    Mixed hyperlipidemia    Age related osteoporosis    Type 2 diabetes mellitus without complication, without long-term current use of insulin (HCC)       PAST MEDICAL HISTORY:  No past medical history on file      PAST SURGICAL HISTORY:  Past Surgical History:   Procedure Laterality Date    MYRINGOTOMY W/ TUBES         FAMILY HISTORY:  Family History   Problem Relation Age of Onset    Thyroid cancer Mother     Prostate cancer Father        SOCIAL HISTORY:  Social History     Socioeconomic History    Marital status: /Civil Union     Spouse name: Not on file    Number of children: Not on file    Years of education: Not on file    Highest education level: Not on file   Occupational History    Not on file   Social Needs    Financial resource strain: Not on file    Food insecurity     Worry: Not on file     Inability: Not on file   [employer] needs     Medical: Not on file     Non-medical: Not on file   Tobacco Use    Smoking status: Never Smoker    Smokeless tobacco: Never Used   Substance and Sexual Activity    Alcohol use: Not on file    Drug use: Not on file   Chantel Splinter Sexual activity: Not on file   Lifestyle    Physical activity     Days per week: Not on file     Minutes per session: Not on file    Stress: Not on file   Relationships    Social connections     Talks on phone: Not on file     Gets together: Not on file     Attends Sabianist service: Not on file     Active member of club or organization: Not on file     Attends meetings of clubs or organizations: Not on file     Relationship status: Not on file    Intimate partner violence     Fear of current or ex partner: Not on file     Emotionally abused: Not on file     Physically abused: Not on file     Forced sexual activity: Not on file   Other Topics Concern    Not on file   Social History Narrative    Not on file       Review of Systems   Respiratory: Negative for shortness of breath  Cardiovascular: Negative for chest pain  Gastrointestinal: Negative for abdominal pain  Objective:  Vitals:    01/04/21 1024   BP: 120/78   BP Location: Left arm   Patient Position: Sitting   Cuff Size: Adult   Pulse: 101   Temp: 97 8 °F (36 6 °C)   TempSrc: Temporal   SpO2: 97%   Weight: 62 1 kg (137 lb)   Height: 5' (1 524 m)     Body mass index is 26 76 kg/m²  Physical Exam  Vitals signs and nursing note reviewed  Constitutional:       Appearance: She is well-developed  Cardiovascular:      Rate and Rhythm: Normal rate and regular rhythm  Heart sounds: Normal heart sounds  Pulmonary:      Effort: Pulmonary effort is normal       Breath sounds: Normal breath sounds  Abdominal:      Palpations: Abdomen is soft  Tenderness: There is no abdominal tenderness  Neurological:      Mental Status: She is alert and oriented to person, place, and time             RESULTS:    Recent Results (from the past 1008 hour(s))   Comprehensive metabolic panel    Collection Time: 12/21/20  7:12 AM   Result Value Ref Range    Sodium 143 136 - 145 mmol/L    Potassium 4 0 3 5 - 5 3 mmol/L    Chloride 108 100 - 108 mmol/L CO2 27 21 - 32 mmol/L    ANION GAP 8 4 - 13 mmol/L    BUN 13 5 - 25 mg/dL    Creatinine 0 76 0 60 - 1 30 mg/dL    Glucose, Fasting 129 (H) 65 - 99 mg/dL    Calcium 9 6 8 3 - 10 1 mg/dL    AST 54 (H) 5 - 45 U/L    ALT 85 (H) 12 - 78 U/L    Alkaline Phosphatase 76 46 - 116 U/L    Total Protein 8 1 6 4 - 8 2 g/dL    Albumin 4 7 3 5 - 5 0 g/dL    Total Bilirubin 0 50 0 20 - 1 00 mg/dL    eGFR 81 ml/min/1 73sq m   CBC and differential    Collection Time: 12/21/20  7:12 AM   Result Value Ref Range    WBC 6 99 4 31 - 10 16 Thousand/uL    RBC 4 46 3 81 - 5 12 Million/uL    Hemoglobin 12 6 11 5 - 15 4 g/dL    Hematocrit 39 8 34 8 - 46 1 %    MCV 89 82 - 98 fL    MCH 28 3 26 8 - 34 3 pg    MCHC 31 7 31 4 - 37 4 g/dL    RDW 13 9 11 6 - 15 1 %    MPV 12 7 8 9 - 12 7 fL    Platelets 328 591 - 208 Thousands/uL    nRBC 0 /100 WBCs    Neutrophils Relative 57 43 - 75 %    Immat GRANS % 0 0 - 2 %    Lymphocytes Relative 32 14 - 44 %    Monocytes Relative 7 4 - 12 %    Eosinophils Relative 3 0 - 6 %    Basophils Relative 1 0 - 1 %    Neutrophils Absolute 4 00 1 85 - 7 62 Thousands/µL    Immature Grans Absolute 0 02 0 00 - 0 20 Thousand/uL    Lymphocytes Absolute 2 20 0 60 - 4 47 Thousands/µL    Monocytes Absolute 0 52 0 17 - 1 22 Thousand/µL    Eosinophils Absolute 0 18 0 00 - 0 61 Thousand/µL    Basophils Absolute 0 07 0 00 - 0 10 Thousands/µL   Hemoglobin A1C    Collection Time: 12/21/20  7:12 AM   Result Value Ref Range    Hemoglobin A1C 6 9 (H) Normal 3 8-5 6%; PreDiabetic 5 7-6 4%;  Diabetic >=6 5%; Glycemic control for adults with diabetes <7 0% %     mg/dl   Lipid panel    Collection Time: 12/21/20  7:12 AM   Result Value Ref Range    Cholesterol 132 50 - 200 mg/dL    Triglycerides 129 <=150 mg/dL    HDL, Direct 41 >=40 mg/dL    LDL Calculated 65 0 - 100 mg/dL    Non-HDL-Chol (CHOL-HDL) 91 mg/dl   T4, free    Collection Time: 12/21/20  7:12 AM   Result Value Ref Range    Free T4 1 01 0 76 - 1 46 ng/dL   TSH, 3rd generation    Collection Time: 12/21/20  7:12 AM   Result Value Ref Range    TSH 3RD GENERATON 4 450 (H) 0 358 - 3 740 uIU/mL   ALT    Collection Time: 12/29/20  8:22 AM   Result Value Ref Range    ALT 67 12 - 78 U/L   AST    Collection Time: 12/29/20  8:22 AM   Result Value Ref Range    AST 36 5 - 45 U/L       This note was created with voice recognition software  Phonic, grammatical and spelling errors may be present within the note as a result

## 2021-01-15 DIAGNOSIS — E03.9 HYPOTHYROIDISM, ACQUIRED: ICD-10-CM

## 2021-01-15 RX ORDER — LEVOTHYROXINE SODIUM 0.05 MG/1
50 TABLET ORAL DAILY
Qty: 14 TABLET | Refills: 1 | Status: SHIPPED | OUTPATIENT
Start: 2021-01-15 | End: 2021-04-26 | Stop reason: SDUPTHER

## 2021-01-15 NOTE — TELEPHONE ENCOUNTER
Please send in a small amount of pills for her levothyroxine [facility] mcg to [facility]   (2 weeks worth?)    her shipment still hasnt arrived from [place] mail away  She will be out of pills by amy as she has been doubling up on 25 mcg she had still       She can pay out of pocket for a few pills to make sure her regiment doesn't change

## 2021-02-24 ENCOUNTER — LAB (OUTPATIENT)
Dept: LAB | Facility: CLINIC | Age: 71
End: 2021-02-24
Payer: COMMERCIAL

## 2021-02-24 DIAGNOSIS — E03.9 HYPOTHYROIDISM, ACQUIRED: ICD-10-CM

## 2021-02-24 LAB
T4 FREE SERPL-MCNC: 1.07 NG/DL (ref 0.76–1.46)
TSH SERPL DL<=0.05 MIU/L-ACNC: 0.73 UIU/ML (ref 0.36–3.74)

## 2021-02-24 PROCEDURE — 36415 COLL VENOUS BLD VENIPUNCTURE: CPT

## 2021-02-24 PROCEDURE — 84439 ASSAY OF FREE THYROXINE: CPT

## 2021-02-24 PROCEDURE — 84443 ASSAY THYROID STIM HORMONE: CPT

## 2021-03-10 DIAGNOSIS — E11.9 TYPE 2 DIABETES MELLITUS WITHOUT COMPLICATION, WITHOUT LONG-TERM CURRENT USE OF INSULIN (HCC): ICD-10-CM

## 2021-03-10 DIAGNOSIS — Z23 ENCOUNTER FOR IMMUNIZATION: ICD-10-CM

## 2021-03-10 DIAGNOSIS — E78.2 MIXED HYPERLIPIDEMIA: ICD-10-CM

## 2021-03-10 RX ORDER — ROSUVASTATIN CALCIUM 10 MG/1
TABLET, COATED ORAL
Qty: 90 TABLET | Refills: 1 | Status: SHIPPED | OUTPATIENT
Start: 2021-03-10 | End: 2021-07-29

## 2021-03-19 ENCOUNTER — TELEPHONE (OUTPATIENT)
Dept: INTERNAL MEDICINE CLINIC | Facility: CLINIC | Age: 71
End: 2021-03-19

## 2021-03-19 NOTE — TELEPHONE ENCOUNTER
Spoke with patient, she is taking a medicine prescribed per her ENT and she is under her treatment  Patient told call her Specialist and review with him about her covid vaccine and other questions  Patient agreed

## 2021-03-19 NOTE — TELEPHONE ENCOUNTER
Patient is on cipro for her ears and she is scheduled for her first covid vaccine on 03/26  She would like to know if she can get the vaccine while she is taking this medication? Please advise        Call back #[phone]

## 2021-03-22 ENCOUNTER — TELEPHONE (OUTPATIENT)
Dept: INTERNAL MEDICINE CLINIC | Facility: CLINIC | Age: 71
End: 2021-03-22

## 2021-03-22 NOTE — TELEPHONE ENCOUNTER
Patient was put on Penicillin for left ear infection  Patient wants to know if she should start the medication today or wait till after she gets her first Covid Vaccine on Friday? Please advise    Mariano Schwab

## 2021-03-26 ENCOUNTER — IMMUNIZATIONS (OUTPATIENT)
Dept: FAMILY MEDICINE CLINIC | Facility: HOSPITAL | Age: 71
End: 2021-03-26

## 2021-03-26 DIAGNOSIS — Z23 ENCOUNTER FOR IMMUNIZATION: Primary | ICD-10-CM

## 2021-03-26 PROCEDURE — 91300 SARS-COV-2 / COVID-19 MRNA VACCINE (PFIZER-BIONTECH) 30 MCG: CPT

## 2021-03-26 PROCEDURE — 0001A SARS-COV-2 / COVID-19 MRNA VACCINE (PFIZER-BIONTECH) 30 MCG: CPT

## 2021-04-14 ENCOUNTER — HOSPITAL ENCOUNTER (OUTPATIENT)
Dept: RADIOLOGY | Facility: HOSPITAL | Age: 71
Discharge: HOME | End: 2021-04-14
Attending: INTERNAL MEDICINE
Payer: COMMERCIAL

## 2021-04-14 ENCOUNTER — OFFICE VISIT (OUTPATIENT)
Dept: INTERNAL MEDICINE CLINIC | Facility: CLINIC | Age: 71
End: 2021-04-14
Payer: COMMERCIAL

## 2021-04-14 VITALS
WEIGHT: 136 LBS | DIASTOLIC BLOOD PRESSURE: 84 MMHG | RESPIRATION RATE: 16 BRPM | BODY MASS INDEX: 26.7 KG/M2 | SYSTOLIC BLOOD PRESSURE: 140 MMHG | TEMPERATURE: 98.7 F | HEIGHT: 60 IN | OXYGEN SATURATION: 99 % | HEART RATE: 102 BPM

## 2021-04-14 DIAGNOSIS — M25.531 RIGHT WRIST PAIN: ICD-10-CM

## 2021-04-14 DIAGNOSIS — M25.531 RIGHT WRIST PAIN: Primary | ICD-10-CM

## 2021-04-14 PROCEDURE — 3008F BODY MASS INDEX DOCD: CPT | Performed by: INTERNAL MEDICINE

## 2021-04-14 PROCEDURE — 1036F TOBACCO NON-USER: CPT | Performed by: INTERNAL MEDICINE

## 2021-04-14 PROCEDURE — 99213 OFFICE O/P EST LOW 20 MIN: CPT | Performed by: INTERNAL MEDICINE

## 2021-04-14 PROCEDURE — 1160F RVW MEDS BY RX/DR IN RCRD: CPT | Performed by: INTERNAL MEDICINE

## 2021-04-14 PROCEDURE — 73110 X-RAY EXAM OF WRIST: CPT

## 2021-04-14 PROCEDURE — 3725F SCREEN DEPRESSION PERFORMED: CPT | Performed by: INTERNAL MEDICINE

## 2021-04-14 NOTE — PROGRESS NOTES
Assessment/Plan:       Most likely a sprain but will send her for an x-ray to make sure  Suggested she take her meloxicam daily for a week or 2  Quality Measures:       No follow-ups on file  No problem-specific Assessment & Plan notes found for this encounter  Diagnoses and all orders for this visit:    Right wrist pain  -     XR wrist 3+ vw right; Future          Subjective:      Patient ID: Caroline Hidalgo is a 78 y o  female  Patient comes in today complaining of continued pain in her right hand  She had tripped and fallen about a month ago and landed on her right hand trying to catch herself  Initially was read and bruised  That went away but she still has tenderness in the hand  Her wrist does not bother her  She has no other complaints from the fall  She did not pass out during the fall  She tripped        ALLERGIES:  Allergies   Allergen Reactions    Bee Venom Shortness Of Breath and Swelling    Latex Rash       CURRENT MEDICATIONS:    Current Outpatient Medications:     busPIRone (BUSPAR) 10 mg tablet, Take 1 tablet (10 mg total) by mouth 2 (two) times a day as needed (anxiety), Disp: 60 tablet, Rfl: 3    cholecalciferol (VITAMIN D3) 1,000 units tablet, Take 1,000 Units by mouth daily, Disp: , Rfl:     fluticasone (FLONASE) 50 mcg/act nasal spray, , Disp: , Rfl:     ibandronate (BONIVA) 150 MG tablet, Take 1 tablet (150 mg total) by mouth every 30 (thirty) days, Disp: 3 tablet, Rfl: 3    levothyroxine 50 mcg tablet, Take 1 tablet (50 mcg total) by mouth daily, Disp: 14 tablet, Rfl: 1    Meloxicam 5 MG CAPS, Take 5 mg by mouth, Disp: , Rfl:     metFORMIN (GLUCOPHAGE) 1000 MG tablet, TAKE 1 TABLET TWICE DAILY WITH MEALS, Disp: 180 tablet, Rfl: 1    methocarbamol (ROBAXIN) 500 mg tablet, Take 500 mg by mouth Three times daily as needed, Disp: , Rfl:     rosuvastatin (CRESTOR) 10 MG tablet, TAKE 1 TABLET EVERY DAY AT BEDTIME, Disp: 90 tablet, Rfl: 1    DOCOSAHEXAENOIC ACID PO, Take 1 tablet by mouth, Disp: , Rfl:     ACTIVE PROBLEM LIST:  Patient Active Problem List   Diagnosis    Basal cell carcinoma of back    Multiple sclerosis ([facility] )    Hypothyroidism, acquired    Mixed hyperlipidemia    Age related osteoporosis    Type 2 diabetes mellitus without complication, without long-term current use of insulin ([facility] )       PAST MEDICAL HISTORY:  No past medical history on file      PAST SURGICAL HISTORY:  Past Surgical History:   Procedure Laterality Date    MYRINGOTOMY W/ TUBES         FAMILY HISTORY:  Family History   Problem Relation Age of Onset    Thyroid cancer Mother     Prostate cancer Father        SOCIAL HISTORY:  Social History     Socioeconomic History    Marital status: /Civil Union     Spouse name: Not on file    Number of children: Not on file    Years of education: Not on file    Highest education level: Not on file   Occupational History    Not on file   Social Needs    Financial resource strain: Not on file    Food insecurity     Worry: Not on file     Inability: Not on file   [employer] needs     Medical: Not on file     Non-medical: Not on file   Tobacco Use    Smoking status: Never Smoker    Smokeless tobacco: Never Used   Substance and Sexual Activity    Alcohol use: Not on file    Drug use: Not on file    Sexual activity: Not on file   Lifestyle    Physical activity     Days per week: Not on file     Minutes per session: Not on file    Stress: Not on file   Relationships    Social connections     Talks on phone: Not on file     Gets together: Not on file     Attends [employer] service: Not on file     Active member of club or organization: Not on file     Attends meetings of clubs or organizations: Not on file     Relationship status: Not on file    Intimate partner violence     Fear of current or ex partner: Not on file     Emotionally abused: Not on file     Physically abused: Not on file     Forced sexual activity: Not on file   Other Topics Concern    Not on file   Social History Narrative    Not on file       Review of Systems   Constitutional: Negative for fever  Musculoskeletal: Negative for joint swelling  Objective:  Vitals:    04/14/21 0958   BP: 140/84   BP Location: Left arm   Patient Position: Sitting   Cuff Size: Standard   Pulse: 102   Resp: 16   Temp: 98 7 °F (37 1 °C)   TempSrc: Tympanic   SpO2: 99%   Weight: 61 7 kg (136 lb)   Height: 5' (1 524 m)     Body mass index is 26 56 kg/m²  Physical Exam  Vitals signs and nursing note reviewed  Constitutional:       Appearance: Normal appearance  Musculoskeletal:      Comments:   Tenderness in the main body of the hand between the 3rd and 4th digits  Decreased  strength secondary to pain  Full range of motion of the wrist    Neurological:      Mental Status: She is alert  RESULTS:    No results found for this or any previous visit (from the past 1008 hour(s))  This note was created with voice recognition software  Phonic, grammatical and spelling errors may be present within the note as a result

## 2021-04-14 NOTE — PROGRESS NOTES
Patient's shoes and socks were not removed  Right Foot/Ankle   Right Foot Inspection  Skin Exam: skin normal and skin intact no dry skin, no warmth, no callus, no erythema, no maceration, no abnormal color, no pre-ulcer, no ulcer and no callus                          Toe Exam: ROM and strength within normal limits  Sensory       Monofilament testing: intact  Vascular    The right DP pulse is 2+  Right Toe  - Comprehensive Exam  Ecchymosis: none  Swelling: none         Left Foot/Ankle  Left Foot Inspection  Skin Exam: skin normal and skin intactno dry skin, no warmth, no erythema, no maceration, normal color, no pre-ulcer, no ulcer and no callus                         Toe Exam: ROM and strength within normal limits                   Sensory       Monofilament: intact  Vascular    The left DP pulse is 2+     Left Toe  - Comprehensive Exam  Ecchymosis: none  Swelling: none       Assign Risk Category:  ; No loss of protective sensation;        Risk: 0

## 2021-04-15 ENCOUNTER — TELEPHONE (OUTPATIENT)
Dept: INTERNAL MEDICINE CLINIC | Facility: CLINIC | Age: 71
End: 2021-04-15

## 2021-04-15 ENCOUNTER — TELEPHONE (OUTPATIENT)
Dept: ADMINISTRATIVE | Facility: OTHER | Age: 71
End: 2021-04-15

## 2021-04-15 NOTE — TELEPHONE ENCOUNTER
----- Message from Annmarie Rushing, [address] [place] sent at 4/14/2021 11:50 AM EDT -----  Regarding: mammo  04/14/21 11:50 AM    Hello, our patient Sosa Palmer has had Mammogram completed/performed  Please assist in updating the patient chart by pulling the Care Everywhere (CE) document  The date of service is 12/20       Thank you,  Annmarie Rushing MA  [facility] [place]

## 2021-04-15 NOTE — TELEPHONE ENCOUNTER
Upon review of the In Basket request we were able to locate, review, and update the patient chart as requested for Mammogram     Any additional questions or concerns should be emailed to the Practice Liaisons via [email or web address]  [email or web address] email, please do not reply via In Basket      Thank you  Kalyn Parsons MA

## 2021-04-16 ENCOUNTER — IMMUNIZATIONS (OUTPATIENT)
Dept: FAMILY MEDICINE CLINIC | Facility: HOSPITAL | Age: 71
End: 2021-04-16

## 2021-04-16 DIAGNOSIS — Z23 ENCOUNTER FOR IMMUNIZATION: Primary | ICD-10-CM

## 2021-04-16 PROCEDURE — 91300 SARS-COV-2 / COVID-19 MRNA VACCINE (PFIZER-BIONTECH) 30 MCG: CPT

## 2021-04-16 PROCEDURE — 0002A SARS-COV-2 / COVID-19 MRNA VACCINE (PFIZER-BIONTECH) 30 MCG: CPT

## 2021-04-26 DIAGNOSIS — E03.9 HYPOTHYROIDISM, ACQUIRED: ICD-10-CM

## 2021-04-26 NOTE — TELEPHONE ENCOUNTER
Please send a 2 week supply of thyroid levothyroxine     [facility] please  To hold over till mail away gets in      And then also send a 90 day supply to [address] please  ALSO     Pt wants to verify her xray on wrist and why she wasn't told about arthritis in it?    Please call back on

## 2021-04-27 RX ORDER — LEVOTHYROXINE SODIUM 0.05 MG/1
50 TABLET ORAL DAILY
Qty: 90 TABLET | Refills: 1 | Status: SHIPPED | OUTPATIENT
Start: 2021-04-27 | End: 2021-04-27 | Stop reason: SDUPTHER

## 2021-04-27 RX ORDER — LEVOTHYROXINE SODIUM 0.05 MG/1
50 TABLET ORAL DAILY
Qty: 14 TABLET | Refills: 0 | Status: SHIPPED | OUTPATIENT
Start: 2021-04-27 | End: 2021-04-29 | Stop reason: SDUPTHER

## 2021-04-29 DIAGNOSIS — E03.9 HYPOTHYROIDISM, ACQUIRED: ICD-10-CM

## 2021-04-29 RX ORDER — LEVOTHYROXINE SODIUM 0.05 MG/1
50 TABLET ORAL DAILY
Qty: 90 TABLET | Refills: 2 | Status: SHIPPED | OUTPATIENT
Start: 2021-04-29 | End: 2021-10-11

## 2021-05-05 ENCOUNTER — TELEPHONE (OUTPATIENT)
Dept: INTERNAL MEDICINE CLINIC | Facility: CLINIC | Age: 71
End: 2021-05-05

## 2021-05-05 DIAGNOSIS — E11.9 TYPE 2 DIABETES MELLITUS WITHOUT COMPLICATION, WITHOUT LONG-TERM CURRENT USE OF INSULIN (HCC): ICD-10-CM

## 2021-05-05 DIAGNOSIS — E03.9 HYPOTHYROIDISM, ACQUIRED: Primary | ICD-10-CM

## 2021-05-07 ENCOUNTER — APPOINTMENT (OUTPATIENT)
Dept: LAB | Facility: CLINIC | Age: 71
End: 2021-05-07
Payer: COMMERCIAL

## 2021-05-07 DIAGNOSIS — E11.9 TYPE 2 DIABETES MELLITUS WITHOUT COMPLICATION, WITHOUT LONG-TERM CURRENT USE OF INSULIN (HCC): ICD-10-CM

## 2021-05-07 DIAGNOSIS — E03.9 HYPOTHYROIDISM, ACQUIRED: ICD-10-CM

## 2021-05-07 LAB
ALBUMIN SERPL BCP-MCNC: 4.6 G/DL (ref 3.5–5)
ALP SERPL-CCNC: 80 U/L (ref 46–116)
ALT SERPL W P-5'-P-CCNC: 68 U/L (ref 12–78)
ANION GAP SERPL CALCULATED.3IONS-SCNC: 8 MMOL/L (ref 4–13)
AST SERPL W P-5'-P-CCNC: 35 U/L (ref 5–45)
BASOPHILS # BLD AUTO: 0.08 THOUSANDS/ΜL (ref 0–0.1)
BASOPHILS NFR BLD AUTO: 1 % (ref 0–1)
BILIRUB SERPL-MCNC: 0.48 MG/DL (ref 0.2–1)
BUN SERPL-MCNC: 13 MG/DL (ref 5–25)
CALCIUM SERPL-MCNC: 9.4 MG/DL (ref 8.3–10.1)
CHLORIDE SERPL-SCNC: 106 MMOL/L (ref 100–108)
CHOLEST SERPL-MCNC: 133 MG/DL (ref 50–200)
CO2 SERPL-SCNC: 26 MMOL/L (ref 21–32)
CREAT SERPL-MCNC: 0.75 MG/DL (ref 0.6–1.3)
EOSINOPHIL # BLD AUTO: 0.2 THOUSAND/ΜL (ref 0–0.61)
EOSINOPHIL NFR BLD AUTO: 3 % (ref 0–6)
ERYTHROCYTE [DISTWIDTH] IN BLOOD BY AUTOMATED COUNT: 13.9 % (ref 11.6–15.1)
EST. AVERAGE GLUCOSE BLD GHB EST-MCNC: 151 MG/DL
GFR SERPL CREATININE-BSD FRML MDRD: 82 ML/MIN/1.73SQ M
GLUCOSE P FAST SERPL-MCNC: 172 MG/DL (ref 65–99)
HBA1C MFR BLD: 6.9 %
HCT VFR BLD AUTO: 41.2 % (ref 34.8–46.1)
HDLC SERPL-MCNC: 46 MG/DL
HGB BLD-MCNC: 13.1 G/DL (ref 11.5–15.4)
IMM GRANULOCYTES # BLD AUTO: 0.02 THOUSAND/UL (ref 0–0.2)
IMM GRANULOCYTES NFR BLD AUTO: 0 % (ref 0–2)
LDLC SERPL CALC-MCNC: 61 MG/DL (ref 0–100)
LYMPHOCYTES # BLD AUTO: 2.41 THOUSANDS/ΜL (ref 0.6–4.47)
LYMPHOCYTES NFR BLD AUTO: 33 % (ref 14–44)
MCH RBC QN AUTO: 27.6 PG (ref 26.8–34.3)
MCHC RBC AUTO-ENTMCNC: 31.8 G/DL (ref 31.4–37.4)
MCV RBC AUTO: 87 FL (ref 82–98)
MONOCYTES # BLD AUTO: 0.59 THOUSAND/ΜL (ref 0.17–1.22)
MONOCYTES NFR BLD AUTO: 8 % (ref 4–12)
NEUTROPHILS # BLD AUTO: 4.03 THOUSANDS/ΜL (ref 1.85–7.62)
NEUTS SEG NFR BLD AUTO: 55 % (ref 43–75)
NONHDLC SERPL-MCNC: 87 MG/DL
NRBC BLD AUTO-RTO: 0 /100 WBCS
PLATELET # BLD AUTO: 277 THOUSANDS/UL (ref 149–390)
PMV BLD AUTO: 12.1 FL (ref 8.9–12.7)
POTASSIUM SERPL-SCNC: 3.5 MMOL/L (ref 3.5–5.3)
PROT SERPL-MCNC: 8.3 G/DL (ref 6.4–8.2)
RBC # BLD AUTO: 4.74 MILLION/UL (ref 3.81–5.12)
SODIUM SERPL-SCNC: 140 MMOL/L (ref 136–145)
T4 FREE SERPL-MCNC: 1.1 NG/DL (ref 0.76–1.46)
TRIGL SERPL-MCNC: 130 MG/DL
TSH SERPL DL<=0.05 MIU/L-ACNC: 0.77 UIU/ML (ref 0.36–3.74)
WBC # BLD AUTO: 7.33 THOUSAND/UL (ref 4.31–10.16)

## 2021-05-07 PROCEDURE — 36415 COLL VENOUS BLD VENIPUNCTURE: CPT

## 2021-05-07 PROCEDURE — 84439 ASSAY OF FREE THYROXINE: CPT

## 2021-05-07 PROCEDURE — 83036 HEMOGLOBIN GLYCOSYLATED A1C: CPT

## 2021-05-07 PROCEDURE — 80053 COMPREHEN METABOLIC PANEL: CPT

## 2021-05-07 PROCEDURE — 80061 LIPID PANEL: CPT

## 2021-05-07 PROCEDURE — 84443 ASSAY THYROID STIM HORMONE: CPT

## 2021-05-07 PROCEDURE — 3044F HG A1C LEVEL LT 7.0%: CPT | Performed by: INTERNAL MEDICINE

## 2021-05-07 PROCEDURE — 85025 COMPLETE CBC W/AUTO DIFF WBC: CPT

## 2021-05-13 ENCOUNTER — RA CDI HCC (OUTPATIENT)
Dept: OTHER | Facility: HOSPITAL | Age: 71
End: 2021-05-13

## 2021-05-13 NOTE — PROGRESS NOTES
[facility] 75  coding opportunities          Chart reviewed, no opportunity found: CHART REVIEWED, NO OPPORTUNITY FOUND              Patients insurance company: [employer] [facility], [facility]  - [facility] only)

## 2021-05-20 ENCOUNTER — OFFICE VISIT (OUTPATIENT)
Dept: INTERNAL MEDICINE CLINIC | Facility: CLINIC | Age: 71
End: 2021-05-20
Payer: COMMERCIAL

## 2021-05-20 VITALS
BODY MASS INDEX: 25.52 KG/M2 | DIASTOLIC BLOOD PRESSURE: 80 MMHG | SYSTOLIC BLOOD PRESSURE: 142 MMHG | WEIGHT: 130 LBS | TEMPERATURE: 98 F | OXYGEN SATURATION: 98 % | HEIGHT: 60 IN | HEART RATE: 102 BPM

## 2021-05-20 DIAGNOSIS — E11.9 TYPE 2 DIABETES MELLITUS WITHOUT COMPLICATION, WITHOUT LONG-TERM CURRENT USE OF INSULIN (HCC): Primary | ICD-10-CM

## 2021-05-20 DIAGNOSIS — G35 MULTIPLE SCLEROSIS (HCC): ICD-10-CM

## 2021-05-20 DIAGNOSIS — S52.501A CLOSED FRACTURE OF DISTAL END OF RIGHT RADIUS, UNSPECIFIED FRACTURE MORPHOLOGY, INITIAL ENCOUNTER: ICD-10-CM

## 2021-05-20 DIAGNOSIS — E03.9 HYPOTHYROIDISM, ACQUIRED: ICD-10-CM

## 2021-05-20 DIAGNOSIS — E78.2 MIXED HYPERLIPIDEMIA: ICD-10-CM

## 2021-05-20 PROCEDURE — 1160F RVW MEDS BY RX/DR IN RCRD: CPT | Performed by: INTERNAL MEDICINE

## 2021-05-20 PROCEDURE — 1036F TOBACCO NON-USER: CPT | Performed by: INTERNAL MEDICINE

## 2021-05-20 PROCEDURE — 99214 OFFICE O/P EST MOD 30 MIN: CPT | Performed by: INTERNAL MEDICINE

## 2021-05-20 PROCEDURE — 3008F BODY MASS INDEX DOCD: CPT | Performed by: INTERNAL MEDICINE

## 2021-05-20 RX ORDER — MELOXICAM 7.5 MG/1
7.5 TABLET ORAL DAILY
Qty: 30 TABLET | Refills: 3 | Status: SHIPPED | OUTPATIENT
Start: 2021-05-20 | End: 2021-10-13 | Stop reason: SDUPTHER

## 2021-05-20 RX ORDER — MELOXICAM 5 MG/1
5 CAPSULE ORAL DAILY
Qty: 90 CAPSULE | Refills: 0 | Status: CANCELLED | OUTPATIENT
Start: 2021-05-20

## 2021-05-20 NOTE — PROGRESS NOTES
Assessment/Plan:       Chronic problems are stable  Continue present medications  Continue diet and exercise  Ordered labs for next visit  Follow-up with orthopedics for the fracture as planned  Quality Measures:       Return in about 4 months (around 9/20/2021)  No problem-specific Assessment & Plan notes found for this encounter  Diagnoses and all orders for this visit:    Type 2 diabetes mellitus without complication, without long-term current use of insulin (HCC)  -     Hemoglobin A1C; Future    Hypothyroidism, acquired  -     T4, free; Future  -     TSH, 3rd generation; Future    Multiple sclerosis (HCC)  -     meloxicam (MOBIC) 7 5 mg tablet; Take 1 tablet (7 5 mg total) by mouth daily    Closed fracture of distal end of right radius, unspecified fracture morphology, initial encounter    Mixed hyperlipidemia  -     Comprehensive metabolic panel; Future  -     CBC and differential; Future  -     Lipid panel; Future    Other orders  -     Cancel: Meloxicam 5 MG CAPS; Take 1 capsule (5 mg total) by mouth daily          Subjective:      Patient ID: Bharati Perez is a 78 y o  female  Patient comes in today for routine follow-up  Her blood work shows her sugars are doing well  Cholesterol is controlled  Blood pressure is controlled  Unfortunately, she had another fall, this time at home  Broke her right wrist   She has already seen orthopedics  She is casted  Swelling is going down  Just wants to get it over with because she needs tubes put in her ears again  No other complaints  No further additions to her history        ALLERGIES:  Allergies   Allergen Reactions    Bee Venom Shortness Of Breath and Swelling    Latex Rash       CURRENT MEDICATIONS:    Current Outpatient Medications:     busPIRone (BUSPAR) 10 mg tablet, Take 1 tablet (10 mg total) by mouth 2 (two) times a day as needed (anxiety), Disp: 60 tablet, Rfl: 3    cholecalciferol (VITAMIN D3) 1,000 units tablet, Take 1,000 Units by mouth daily, Disp: , Rfl:     DOCOSAHEXAENOIC ACID PO, Take 1 tablet by mouth, Disp: , Rfl:     fluticasone (FLONASE) 50 mcg/act nasal spray, , Disp: , Rfl:     ibandronate (BONIVA) 150 MG tablet, Take 1 tablet (150 mg total) by mouth every 30 (thirty) days, Disp: 3 tablet, Rfl: 3    levothyroxine 50 mcg tablet, Take 1 tablet (50 mcg total) by mouth daily, Disp: 90 tablet, Rfl: 2    metFORMIN (GLUCOPHAGE) 1000 MG tablet, TAKE 1 TABLET TWICE DAILY WITH MEALS, Disp: 180 tablet, Rfl: 1    methocarbamol (ROBAXIN) 500 mg tablet, Take 500 mg by mouth Three times daily as needed, Disp: , Rfl:     rosuvastatin (CRESTOR) 10 MG tablet, TAKE 1 TABLET EVERY DAY AT BEDTIME, Disp: 90 tablet, Rfl: 1    meloxicam (MOBIC) 7 5 mg tablet, Take 1 tablet (7 5 mg total) by mouth daily, Disp: 30 tablet, Rfl: 3    ACTIVE PROBLEM LIST:  Patient Active Problem List   Diagnosis    Basal cell carcinoma of back    Multiple sclerosis (HCC)    Hypothyroidism, acquired    Mixed hyperlipidemia    Age related osteoporosis    Type 2 diabetes mellitus without complication, without long-term current use of insulin (HCC)       PAST MEDICAL HISTORY:  History reviewed  No pertinent past medical history      PAST SURGICAL HISTORY:  Past Surgical History:   Procedure Laterality Date    MYRINGOTOMY W/ TUBES         FAMILY HISTORY:  Family History   Problem Relation Age of Onset    Thyroid cancer Mother     Prostate cancer Father        SOCIAL HISTORY:  Social History     Socioeconomic History    Marital status: /Civil Union     Spouse name: Not on file    Number of children: Not on file    Years of education: Not on file    Highest education level: Not on file   Occupational History    Not on file   Social Needs    Financial resource strain: Not on file    Food insecurity     Worry: Not on file     Inability: Not on file    Transportation needs     Medical: Not on file     Non-medical: Not on file   Tobacco Use  Smoking status: Never Smoker    Smokeless tobacco: Never Used   Substance and Sexual Activity    Alcohol use: Not on file    Drug use: Not on file    Sexual activity: Not on file   Lifestyle    Physical activity     Days per week: Not on file     Minutes per session: Not on file    Stress: Not on file   Relationships    Social connections     Talks on phone: Not on file     Gets together: Not on file     Attends Yazidi service: Not on file     Active member of club or organization: Not on file     Attends meetings of clubs or organizations: Not on file     Relationship status: Not on file    Intimate partner violence     Fear of current or ex partner: Not on file     Emotionally abused: Not on file     Physically abused: Not on file     Forced sexual activity: Not on file   Other Topics Concern    Not on file   Social History Narrative    Not on file       Review of Systems   Respiratory: Negative for shortness of breath  Cardiovascular: Negative for chest pain  Gastrointestinal: Negative for abdominal pain  Objective:  Vitals:    05/20/21 1337   BP: 142/80   BP Location: Left arm   Patient Position: Sitting   Cuff Size: Adult   Pulse: 102   Temp: 98 °F (36 7 °C)   TempSrc: Temporal   SpO2: 98%   Weight: 59 kg (130 lb)   Height: 5' (1 524 m)     Body mass index is 25 39 kg/m²  Physical Exam  Vitals signs and nursing note reviewed  Constitutional:       Appearance: She is well-developed  Cardiovascular:      Rate and Rhythm: Normal rate and regular rhythm  Heart sounds: Normal heart sounds  Pulmonary:      Effort: Pulmonary effort is normal       Breath sounds: Normal breath sounds  Abdominal:      Palpations: Abdomen is soft  Tenderness: There is no abdominal tenderness  Musculoskeletal:      Comments:   Right forearm is casted   Neurological:      Mental Status: She is alert and oriented to person, place, and time             RESULTS:    Recent Results (from the past 1008 hour(s))   Comprehensive metabolic panel    Collection Time: 05/07/21  7:33 AM   Result Value Ref Range    Sodium 140 136 - 145 mmol/L    Potassium 3 5 3 5 - 5 3 mmol/L    Chloride 106 100 - 108 mmol/L    CO2 26 21 - 32 mmol/L    ANION GAP 8 4 - 13 mmol/L    BUN 13 5 - 25 mg/dL    Creatinine 0 75 0 60 - 1 30 mg/dL    Glucose, Fasting 172 (H) 65 - 99 mg/dL    Calcium 9 4 8 3 - 10 1 mg/dL    AST 35 5 - 45 U/L    ALT 68 12 - 78 U/L    Alkaline Phosphatase 80 46 - 116 U/L    Total Protein 8 3 (H) 6 4 - 8 2 g/dL    Albumin 4 6 3 5 - 5 0 g/dL    Total Bilirubin 0 48 0 20 - 1 00 mg/dL    eGFR 82 ml/min/1 73sq m   CBC and differential    Collection Time: 05/07/21  7:33 AM   Result Value Ref Range    WBC 7 33 4 31 - 10 16 Thousand/uL    RBC 4 74 3 81 - 5 12 Million/uL    Hemoglobin 13 1 11 5 - 15 4 g/dL    Hematocrit 41 2 34 8 - 46 1 %    MCV 87 82 - 98 fL    MCH 27 6 26 8 - 34 3 pg    MCHC 31 8 31 4 - 37 4 g/dL    RDW 13 9 11 6 - 15 1 %    MPV 12 1 8 9 - 12 7 fL    Platelets 556 819 - 889 Thousands/uL    nRBC 0 /100 WBCs    Neutrophils Relative 55 43 - 75 %    Immat GRANS % 0 0 - 2 %    Lymphocytes Relative 33 14 - 44 %    Monocytes Relative 8 4 - 12 %    Eosinophils Relative 3 0 - 6 %    Basophils Relative 1 0 - 1 %    Neutrophils Absolute 4 03 1 85 - 7 62 Thousands/µL    Immature Grans Absolute 0 02 0 00 - 0 20 Thousand/uL    Lymphocytes Absolute 2 41 0 60 - 4 47 Thousands/µL    Monocytes Absolute 0 59 0 17 - 1 22 Thousand/µL    Eosinophils Absolute 0 20 0 00 - 0 61 Thousand/µL    Basophils Absolute 0 08 0 00 - 0 10 Thousands/µL   Hemoglobin A1C    Collection Time: 05/07/21  7:33 AM   Result Value Ref Range    Hemoglobin A1C 6 9 (H) Normal 3 8-5 6%; PreDiabetic 5 7-6 4%;  Diabetic >=6 5%; Glycemic control for adults with diabetes <7 0% %     mg/dl   Lipid panel    Collection Time: 05/07/21  7:33 AM   Result Value Ref Range    Cholesterol 133 50 - 200 mg/dL    Triglycerides 130 <=150 mg/dL    HDL, Direct 46 >=40 mg/dL    LDL Calculated 61 0 - 100 mg/dL    Non-HDL-Chol (CHOL-HDL) 87 mg/dl   T4, free    Collection Time: 05/07/21  7:33 AM   Result Value Ref Range    Free T4 1 10 0 76 - 1 46 ng/dL   TSH, 3rd generation    Collection Time: 05/07/21  7:33 AM   Result Value Ref Range    TSH 3RD GENERATON 0 768 0 358 - 3 740 uIU/mL       This note was created with voice recognition software  Phonic, grammatical and spelling errors may be present within the note as a result

## 2021-06-18 ENCOUNTER — VBI (OUTPATIENT)
Dept: ADMINISTRATIVE | Facility: OTHER | Age: 71
End: 2021-06-18

## 2021-07-28 DIAGNOSIS — E11.9 TYPE 2 DIABETES MELLITUS WITHOUT COMPLICATION, WITHOUT LONG-TERM CURRENT USE OF INSULIN (HCC): ICD-10-CM

## 2021-07-28 DIAGNOSIS — E78.2 MIXED HYPERLIPIDEMIA: ICD-10-CM

## 2021-07-29 RX ORDER — ROSUVASTATIN CALCIUM 10 MG/1
TABLET, COATED ORAL
Qty: 90 TABLET | Refills: 1 | Status: SHIPPED | OUTPATIENT
Start: 2021-07-29 | End: 2022-05-05

## 2021-08-19 ENCOUNTER — TELEPHONE (OUTPATIENT)
Dept: INTERNAL MEDICINE CLINIC | Facility: CLINIC | Age: 71
End: 2021-08-19

## 2021-08-19 NOTE — TELEPHONE ENCOUNTER
By the guidelines we were given, the medicines used to treat conditions such as MS would make her immunocompromised, not her underlying condition  She is not on any of those medicines

## 2021-08-19 NOTE — TELEPHONE ENCOUNTER
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

## 2021-09-09 ENCOUNTER — APPOINTMENT (OUTPATIENT)
Dept: LAB | Facility: CLINIC | Age: 71
End: 2021-09-09
Payer: COMMERCIAL

## 2021-09-09 DIAGNOSIS — E78.2 MIXED HYPERLIPIDEMIA: ICD-10-CM

## 2021-09-09 DIAGNOSIS — E03.9 HYPOTHYROIDISM, ACQUIRED: ICD-10-CM

## 2021-09-09 DIAGNOSIS — E11.9 TYPE 2 DIABETES MELLITUS WITHOUT COMPLICATION, WITHOUT LONG-TERM CURRENT USE OF INSULIN (HCC): ICD-10-CM

## 2021-09-09 LAB
ALBUMIN SERPL BCP-MCNC: 4.2 G/DL (ref 3.5–5)
ALP SERPL-CCNC: 90 U/L (ref 46–116)
ALT SERPL W P-5'-P-CCNC: 64 U/L (ref 12–78)
ANION GAP SERPL CALCULATED.3IONS-SCNC: 5 MMOL/L (ref 4–13)
AST SERPL W P-5'-P-CCNC: 48 U/L (ref 5–45)
BASOPHILS # BLD AUTO: 0.05 THOUSANDS/ΜL (ref 0–0.1)
BASOPHILS NFR BLD AUTO: 1 % (ref 0–1)
BILIRUB SERPL-MCNC: 0.58 MG/DL (ref 0.2–1)
BUN SERPL-MCNC: 13 MG/DL (ref 5–25)
CALCIUM SERPL-MCNC: 9.2 MG/DL (ref 8.3–10.1)
CHLORIDE SERPL-SCNC: 104 MMOL/L (ref 100–108)
CHOLEST SERPL-MCNC: 127 MG/DL (ref 50–200)
CO2 SERPL-SCNC: 26 MMOL/L (ref 21–32)
CREAT SERPL-MCNC: 0.76 MG/DL (ref 0.6–1.3)
EOSINOPHIL # BLD AUTO: 0.13 THOUSAND/ΜL (ref 0–0.61)
EOSINOPHIL NFR BLD AUTO: 2 % (ref 0–6)
ERYTHROCYTE [DISTWIDTH] IN BLOOD BY AUTOMATED COUNT: 13.5 % (ref 11.6–15.1)
EST. AVERAGE GLUCOSE BLD GHB EST-MCNC: 160 MG/DL
GFR SERPL CREATININE-BSD FRML MDRD: 81 ML/MIN/1.73SQ M
GLUCOSE P FAST SERPL-MCNC: 177 MG/DL (ref 65–99)
HBA1C MFR BLD: 7.2 %
HCT VFR BLD AUTO: 37.8 % (ref 34.8–46.1)
HDLC SERPL-MCNC: 39 MG/DL
HGB BLD-MCNC: 12 G/DL (ref 11.5–15.4)
IMM GRANULOCYTES # BLD AUTO: 0.03 THOUSAND/UL (ref 0–0.2)
IMM GRANULOCYTES NFR BLD AUTO: 0 % (ref 0–2)
LDLC SERPL CALC-MCNC: 61 MG/DL (ref 0–100)
LYMPHOCYTES # BLD AUTO: 2.18 THOUSANDS/ΜL (ref 0.6–4.47)
LYMPHOCYTES NFR BLD AUTO: 28 % (ref 14–44)
MCH RBC QN AUTO: 28 PG (ref 26.8–34.3)
MCHC RBC AUTO-ENTMCNC: 31.7 G/DL (ref 31.4–37.4)
MCV RBC AUTO: 88 FL (ref 82–98)
MONOCYTES # BLD AUTO: 0.58 THOUSAND/ΜL (ref 0.17–1.22)
MONOCYTES NFR BLD AUTO: 7 % (ref 4–12)
NEUTROPHILS # BLD AUTO: 4.83 THOUSANDS/ΜL (ref 1.85–7.62)
NEUTS SEG NFR BLD AUTO: 62 % (ref 43–75)
NONHDLC SERPL-MCNC: 88 MG/DL
NRBC BLD AUTO-RTO: 0 /100 WBCS
PLATELET # BLD AUTO: 260 THOUSANDS/UL (ref 149–390)
PMV BLD AUTO: 11.8 FL (ref 8.9–12.7)
POTASSIUM SERPL-SCNC: 4.5 MMOL/L (ref 3.5–5.3)
PROT SERPL-MCNC: 8 G/DL (ref 6.4–8.2)
RBC # BLD AUTO: 4.29 MILLION/UL (ref 3.81–5.12)
SODIUM SERPL-SCNC: 135 MMOL/L (ref 136–145)
T4 FREE SERPL-MCNC: 1.03 NG/DL (ref 0.76–1.46)
TRIGL SERPL-MCNC: 134 MG/DL
TSH SERPL DL<=0.05 MIU/L-ACNC: 1.19 UIU/ML (ref 0.36–3.74)
WBC # BLD AUTO: 7.8 THOUSAND/UL (ref 4.31–10.16)

## 2021-09-09 PROCEDURE — 84443 ASSAY THYROID STIM HORMONE: CPT

## 2021-09-09 PROCEDURE — 84439 ASSAY OF FREE THYROXINE: CPT

## 2021-09-09 PROCEDURE — 3051F HG A1C>EQUAL 7.0%<8.0%: CPT | Performed by: INTERNAL MEDICINE

## 2021-09-09 PROCEDURE — 83036 HEMOGLOBIN GLYCOSYLATED A1C: CPT

## 2021-09-09 PROCEDURE — 80053 COMPREHEN METABOLIC PANEL: CPT

## 2021-09-09 PROCEDURE — 85025 COMPLETE CBC W/AUTO DIFF WBC: CPT

## 2021-09-09 PROCEDURE — 36415 COLL VENOUS BLD VENIPUNCTURE: CPT

## 2021-09-09 PROCEDURE — 80061 LIPID PANEL: CPT

## 2021-09-15 ENCOUNTER — RA CDI HCC (OUTPATIENT)
Dept: OTHER | Facility: HOSPITAL | Age: 71
End: 2021-09-15

## 2021-09-15 NOTE — PROGRESS NOTES
[facility] 75  coding opportunities       Chart reviewed, no opportunity found: CHART REVIEWED, NO OPPORTUNITY FOUND                        Patients insurance company: [employer] Ad[employer] only)

## 2021-09-22 ENCOUNTER — OFFICE VISIT (OUTPATIENT)
Dept: INTERNAL MEDICINE CLINIC | Facility: CLINIC | Age: 71
End: 2021-09-22
Payer: COMMERCIAL

## 2021-09-22 VITALS
HEART RATE: 92 BPM | SYSTOLIC BLOOD PRESSURE: 142 MMHG | WEIGHT: 137.2 LBS | BODY MASS INDEX: 26.93 KG/M2 | OXYGEN SATURATION: 99 % | DIASTOLIC BLOOD PRESSURE: 80 MMHG | HEIGHT: 60 IN

## 2021-09-22 DIAGNOSIS — E11.9 TYPE 2 DIABETES MELLITUS WITHOUT COMPLICATION, WITHOUT LONG-TERM CURRENT USE OF INSULIN (HCC): Primary | ICD-10-CM

## 2021-09-22 DIAGNOSIS — E03.9 HYPOTHYROIDISM, ACQUIRED: ICD-10-CM

## 2021-09-22 DIAGNOSIS — B35.4 TINEA OF THE BODY: ICD-10-CM

## 2021-09-22 DIAGNOSIS — E78.2 MIXED HYPERLIPIDEMIA: ICD-10-CM

## 2021-09-22 PROCEDURE — 3008F BODY MASS INDEX DOCD: CPT | Performed by: INTERNAL MEDICINE

## 2021-09-22 PROCEDURE — 1160F RVW MEDS BY RX/DR IN RCRD: CPT | Performed by: INTERNAL MEDICINE

## 2021-09-22 PROCEDURE — 99214 OFFICE O/P EST MOD 30 MIN: CPT | Performed by: INTERNAL MEDICINE

## 2021-09-22 RX ORDER — NYSTATIN 100000 U/G
CREAM TOPICAL 2 TIMES DAILY
Qty: 30 G | Refills: 1 | Status: SHIPPED | OUTPATIENT
Start: 2021-09-22 | End: 2022-06-14

## 2021-09-22 NOTE — PROGRESS NOTES
Assessment/Plan:     Chronic problems are stable  Continue present medications  Continue diet and exercise  Ordered labs for next visit  Quality Measures:       Return in about 4 months (around 1/22/2022) for Regular visit and Medicare Wellness  No problem-specific Assessment & Plan notes found for this encounter  Diagnoses and all orders for this visit:    Type 2 diabetes mellitus without complication, without long-term current use of insulin (HCC)  -     Comprehensive metabolic panel; Future  -     CBC and differential; Future  -     Hemoglobin A1C; Future  -     Lipid panel; Future    Hypothyroidism, acquired    Mixed hyperlipidemia    Tinea of the body  -     nystatin (MYCOSTATIN) cream; Apply topically 2 (two) times a day    Other orders  -     TURMERIC PO; Take by mouth          Subjective:      Patient ID: Karen Marie is a 78 y o  female  Patient comes in today for routine follow-up  She states she is doing okay  Her blood work shows her sugars went up slightly  But she knows what she did wrong  Her blood pressure is controlled  Cholesterol is controlled thyroid levels are controlled  Trying to watch her diet and now already has gotten back on track  Doing better with her back  She started taking turmeric which she feels is really helping her  For only complain is what she describes as a heat rash under her breasts  The left has lingered  She tried over-the-counter products with no relief        ALLERGIES:  Allergies   Allergen Reactions    Bee Venom Shortness Of Breath and Swelling    Latex Rash       CURRENT MEDICATIONS:    Current Outpatient Medications:     busPIRone (BUSPAR) 10 mg tablet, Take 1 tablet (10 mg total) by mouth 2 (two) times a day as needed (anxiety), Disp: 60 tablet, Rfl: 3    cholecalciferol (VITAMIN D3) 1,000 units tablet, Take 1,000 Units by mouth daily, Disp: , Rfl:     fluticasone (FLONASE) 50 mcg/act nasal spray, , Disp: , Rfl:     ibandronate (BONIVA) 150 MG tablet, Take 1 tablet (150 mg total) by mouth every 30 (thirty) days, Disp: 3 tablet, Rfl: 3    levothyroxine 50 mcg tablet, Take 1 tablet (50 mcg total) by mouth daily, Disp: 90 tablet, Rfl: 2    meloxicam (MOBIC) 7 5 mg tablet, Take 1 tablet (7 5 mg total) by mouth daily, Disp: 30 tablet, Rfl: 3    metFORMIN (GLUCOPHAGE) 1000 MG tablet, TAKE 1 TABLET TWICE DAILY WITH MEALS, Disp: 180 tablet, Rfl: 1    rosuvastatin (CRESTOR) 10 MG tablet, TAKE 1 TABLET AT BEDTIME, Disp: 90 tablet, Rfl: 1    TURMERIC PO, Take by mouth, Disp: , Rfl:     nystatin (MYCOSTATIN) cream, Apply topically 2 (two) times a day, Disp: 30 g, Rfl: 1    ACTIVE PROBLEM LIST:  Patient Active Problem List   Diagnosis    Basal cell carcinoma of back    Multiple sclerosis (HCC)    Hypothyroidism, acquired    Mixed hyperlipidemia    Age related osteoporosis    Type 2 diabetes mellitus without complication, without long-term current use of insulin (HCC)       PAST MEDICAL HISTORY:  History reviewed  No pertinent past medical history      PAST SURGICAL HISTORY:  Past Surgical History:   Procedure Laterality Date    MYRINGOTOMY W/ TUBES         FAMILY HISTORY:  Family History   Problem Relation Age of Onset    Thyroid cancer Mother     Prostate cancer Father        SOCIAL HISTORY:  Social History     Socioeconomic History    Marital status: /Civil Union     Spouse name: Not on file    Number of children: Not on file    Years of education: Not on file    Highest education level: Not on file   Occupational History    Not on file   Tobacco Use    Smoking status: Never Smoker    Smokeless tobacco: Never Used   Substance and Sexual Activity    Alcohol use: Not on file    Drug use: Not on file    Sexual activity: Not on file   Other Topics Concern    Not on file   Social History Narrative    Not on file     Social Determinants of Health     Financial Resource Strain:     Difficulty of Paying Living Expenses: Food Insecurity:     Worried About Running Out of Food in the Last Year:     [address] in the Last Year:    Transportation Needs:     Lack of Transportation (Medical):  Lack of Transportation (Non-Medical):    Physical Activity:     Days of Exercise per Week:     Minutes of Exercise per Session:    Stress:     Feeling of Stress :    Social Connections:     Frequency of Communication with Friends and Family:     Frequency of Social Gatherings with Friends and Family:     Attends Confucianism Services:     Active Member of Clubs or Organizations:     Attends Club or Organization Meetings:     Marital Status:    Intimate Partner Violence:     Fear of Current or Ex-Partner:     Emotionally Abused:     Physically Abused:     Sexually Abused:        Review of Systems   Respiratory: Negative for shortness of breath  Cardiovascular: Negative for chest pain  Gastrointestinal: Negative for abdominal pain  Objective:  Vitals:    09/22/21 1047   BP: 142/80   BP Location: Left arm   Patient Position: Sitting   Cuff Size: Adult   Pulse: 92   SpO2: 99%   Weight: 62 2 kg (137 lb 3 2 oz)   Height: 5' (1 524 m)     Body mass index is 26 8 kg/m²  Physical Exam  Vitals and nursing note reviewed  Constitutional:       Appearance: She is well-developed  Cardiovascular:      Rate and Rhythm: Normal rate and regular rhythm  Heart sounds: Normal heart sounds  Pulmonary:      Effort: Pulmonary effort is normal       Breath sounds: Normal breath sounds  Abdominal:      Palpations: Abdomen is soft  Tenderness: There is no abdominal tenderness  Skin:     Findings: Rash (Tinea under the left breast) present  Neurological:      Mental Status: She is alert and oriented to person, place, and time             RESULTS:    Recent Results (from the past 1008 hour(s))   T4, free    Collection Time: 09/09/21 10:10 AM   Result Value Ref Range    Free T4 1 03 0 76 - 1 46 ng/dL   TSH, 3rd generation    Collection Time: 09/09/21 10:10 AM   Result Value Ref Range    TSH 3RD GENERATON 1 190 0 358 - 3 740 uIU/mL   Comprehensive metabolic panel    Collection Time: 09/09/21 10:10 AM   Result Value Ref Range    Sodium 135 (L) 136 - 145 mmol/L    Potassium 4 5 3 5 - 5 3 mmol/L    Chloride 104 100 - 108 mmol/L    CO2 26 21 - 32 mmol/L    ANION GAP 5 4 - 13 mmol/L    BUN 13 5 - 25 mg/dL    Creatinine 0 76 0 60 - 1 30 mg/dL    Glucose, Fasting 177 (H) 65 - 99 mg/dL    Calcium 9 2 8 3 - 10 1 mg/dL    AST 48 (H) 5 - 45 U/L    ALT 64 12 - 78 U/L    Alkaline Phosphatase 90 46 - 116 U/L    Total Protein 8 0 6 4 - 8 2 g/dL    Albumin 4 2 3 5 - 5 0 g/dL    Total Bilirubin 0 58 0 20 - 1 00 mg/dL    eGFR 81 ml/min/1 73sq m   CBC and differential    Collection Time: 09/09/21 10:10 AM   Result Value Ref Range    WBC 7 80 4 31 - 10 16 Thousand/uL    RBC 4 29 3 81 - 5 12 Million/uL    Hemoglobin 12 0 11 5 - 15 4 g/dL    Hematocrit 37 8 34 8 - 46 1 %    MCV 88 82 - 98 fL    MCH 28 0 26 8 - 34 3 pg    MCHC 31 7 31 4 - 37 4 g/dL    RDW 13 5 11 6 - 15 1 %    MPV 11 8 8 9 - 12 7 fL    Platelets 825 086 - 154 Thousands/uL    nRBC 0 /100 WBCs    Neutrophils Relative 62 43 - 75 %    Immat GRANS % 0 0 - 2 %    Lymphocytes Relative 28 14 - 44 %    Monocytes Relative 7 4 - 12 %    Eosinophils Relative 2 0 - 6 %    Basophils Relative 1 0 - 1 %    Neutrophils Absolute 4 83 1 85 - 7 62 Thousands/µL    Immature Grans Absolute 0 03 0 00 - 0 20 Thousand/uL    Lymphocytes Absolute 2 18 0 60 - 4 47 Thousands/µL    Monocytes Absolute 0 58 0 17 - 1 22 Thousand/µL    Eosinophils Absolute 0 13 0 00 - 0 61 Thousand/µL    Basophils Absolute 0 05 0 00 - 0 10 Thousands/µL   Hemoglobin A1C    Collection Time: 09/09/21 10:10 AM   Result Value Ref Range    Hemoglobin A1C 7 2 (H) Normal 3 8-5 6%; PreDiabetic 5 7-6 4%;  Diabetic >=6 5%; Glycemic control for adults with diabetes <7 0% %     mg/dl   Lipid panel    Collection Time: 09/09/21 10:10 AM Result Value Ref Range    Cholesterol 127 50 - 200 mg/dL    Triglycerides 134 <=150 mg/dL    HDL, Direct 39 (L) >=40 mg/dL    LDL Calculated 61 0 - 100 mg/dL    Non-HDL-Chol (CHOL-HDL) 88 mg/dl       This note was created with voice recognition software  Phonic, grammatical and spelling errors may be present within the note as a result

## 2021-10-13 DIAGNOSIS — G35 MULTIPLE SCLEROSIS (HCC): ICD-10-CM

## 2021-10-13 RX ORDER — MELOXICAM 7.5 MG/1
7.5 TABLET ORAL DAILY
Qty: 90 TABLET | Refills: 1 | Status: SHIPPED | OUTPATIENT
Start: 2021-10-13 | End: 2022-06-14

## 2021-12-02 ENCOUNTER — VBI (OUTPATIENT)
Dept: ADMINISTRATIVE | Facility: OTHER | Age: 71
End: 2021-12-02

## 2022-01-12 ENCOUNTER — TELEPHONE (OUTPATIENT)
Dept: ADMINISTRATIVE | Facility: OTHER | Age: 72
End: 2022-01-12

## 2022-01-12 NOTE — TELEPHONE ENCOUNTER
Upon review of the In Basket request we have found that we are unable to obtain a copy of the exclusion documentation requested because No Diabetic eye exam in Encounter from DOS provide  Any additional questions or concerns should be emailed to the Practice Liaisons via [email or web address] email, please do not reply via In Basket      Thank you  Ana Le MA

## 2022-01-12 NOTE — TELEPHONE ENCOUNTER
----- Message from Merissa Ziegler sent at 1/11/2022 11:29 AM EST -----  Regarding: mammo  01/11/22 11:29 AM    Hello, our patient Sosa Palmer has had Diabetic Eye Exam completed/performed  Please assist in updating the patient chart by pulling the document from encounters Tab within Chart Review   The date of service is 12/10/2021    Thank you,  JULIO C Ziegler [facility] [place]

## 2022-01-13 ENCOUNTER — TELEPHONE (OUTPATIENT)
Dept: INTERNAL MEDICINE CLINIC | Facility: CLINIC | Age: 72
End: 2022-01-13

## 2022-01-20 ENCOUNTER — RA CDI HCC (OUTPATIENT)
Dept: OTHER | Facility: HOSPITAL | Age: 72
End: 2022-01-20

## 2022-01-20 NOTE — PROGRESS NOTES
[facility] 75  coding opportunities       Chart reviewed, no opportunity found: CHART REVIEWED, NO OPPORTUNITY FOUND                        Patients insurance company: [employer] only)

## 2022-01-21 ENCOUNTER — APPOINTMENT (OUTPATIENT)
Dept: LAB | Facility: CLINIC | Age: 72
End: 2022-01-21
Payer: COMMERCIAL

## 2022-01-21 DIAGNOSIS — E11.9 TYPE 2 DIABETES MELLITUS WITHOUT COMPLICATION, WITHOUT LONG-TERM CURRENT USE OF INSULIN (HCC): ICD-10-CM

## 2022-01-21 LAB
ALBUMIN SERPL BCP-MCNC: 4.4 G/DL (ref 3.5–5)
ALP SERPL-CCNC: 82 U/L (ref 46–116)
ALT SERPL W P-5'-P-CCNC: 56 U/L (ref 12–78)
ANION GAP SERPL CALCULATED.3IONS-SCNC: 5 MMOL/L (ref 4–13)
AST SERPL W P-5'-P-CCNC: 31 U/L (ref 5–45)
BASOPHILS # BLD AUTO: 0.07 THOUSANDS/ΜL (ref 0–0.1)
BASOPHILS NFR BLD AUTO: 1 % (ref 0–1)
BILIRUB SERPL-MCNC: 0.41 MG/DL (ref 0.2–1)
BUN SERPL-MCNC: 16 MG/DL (ref 5–25)
CALCIUM SERPL-MCNC: 9.2 MG/DL (ref 8.3–10.1)
CHLORIDE SERPL-SCNC: 105 MMOL/L (ref 100–108)
CHOLEST SERPL-MCNC: 138 MG/DL
CO2 SERPL-SCNC: 26 MMOL/L (ref 21–32)
CREAT SERPL-MCNC: 0.75 MG/DL (ref 0.6–1.3)
EOSINOPHIL # BLD AUTO: 0.15 THOUSAND/ΜL (ref 0–0.61)
EOSINOPHIL NFR BLD AUTO: 2 % (ref 0–6)
ERYTHROCYTE [DISTWIDTH] IN BLOOD BY AUTOMATED COUNT: 13.9 % (ref 11.6–15.1)
GFR SERPL CREATININE-BSD FRML MDRD: 81 ML/MIN/1.73SQ M
GLUCOSE P FAST SERPL-MCNC: 182 MG/DL (ref 65–99)
HCT VFR BLD AUTO: 39 % (ref 34.8–46.1)
HDLC SERPL-MCNC: 34 MG/DL
HGB BLD-MCNC: 12.2 G/DL (ref 11.5–15.4)
IMM GRANULOCYTES # BLD AUTO: 0.03 THOUSAND/UL (ref 0–0.2)
IMM GRANULOCYTES NFR BLD AUTO: 0 % (ref 0–2)
LDLC SERPL CALC-MCNC: 66 MG/DL (ref 0–100)
LYMPHOCYTES # BLD AUTO: 2.29 THOUSANDS/ΜL (ref 0.6–4.47)
LYMPHOCYTES NFR BLD AUTO: 29 % (ref 14–44)
MCH RBC QN AUTO: 27.4 PG (ref 26.8–34.3)
MCHC RBC AUTO-ENTMCNC: 31.3 G/DL (ref 31.4–37.4)
MCV RBC AUTO: 88 FL (ref 82–98)
MONOCYTES # BLD AUTO: 0.58 THOUSAND/ΜL (ref 0.17–1.22)
MONOCYTES NFR BLD AUTO: 7 % (ref 4–12)
NEUTROPHILS # BLD AUTO: 4.8 THOUSANDS/ΜL (ref 1.85–7.62)
NEUTS SEG NFR BLD AUTO: 61 % (ref 43–75)
NONHDLC SERPL-MCNC: 104 MG/DL
NRBC BLD AUTO-RTO: 0 /100 WBCS
PLATELET # BLD AUTO: 285 THOUSANDS/UL (ref 149–390)
PMV BLD AUTO: 11.8 FL (ref 8.9–12.7)
POTASSIUM SERPL-SCNC: 4.4 MMOL/L (ref 3.5–5.3)
PROT SERPL-MCNC: 8.1 G/DL (ref 6.4–8.2)
RBC # BLD AUTO: 4.45 MILLION/UL (ref 3.81–5.12)
SODIUM SERPL-SCNC: 136 MMOL/L (ref 136–145)
TRIGL SERPL-MCNC: 191 MG/DL
WBC # BLD AUTO: 7.92 THOUSAND/UL (ref 4.31–10.16)

## 2022-01-21 PROCEDURE — 80061 LIPID PANEL: CPT

## 2022-01-21 PROCEDURE — 85025 COMPLETE CBC W/AUTO DIFF WBC: CPT

## 2022-01-21 PROCEDURE — 36415 COLL VENOUS BLD VENIPUNCTURE: CPT

## 2022-01-21 PROCEDURE — 83036 HEMOGLOBIN GLYCOSYLATED A1C: CPT

## 2022-01-21 PROCEDURE — 80053 COMPREHEN METABOLIC PANEL: CPT

## 2022-01-22 LAB
EST. AVERAGE GLUCOSE BLD GHB EST-MCNC: 163 MG/DL
HBA1C MFR BLD: 7.3 %

## 2022-02-03 DIAGNOSIS — E11.9 TYPE 2 DIABETES MELLITUS WITHOUT COMPLICATION, WITHOUT LONG-TERM CURRENT USE OF INSULIN (HCC): Primary | ICD-10-CM

## 2022-02-04 RX ORDER — BLOOD SUGAR DIAGNOSTIC
STRIP MISCELLANEOUS
Qty: 100 STRIP | Refills: 1 | Status: SHIPPED | OUTPATIENT
Start: 2022-02-04

## 2022-02-04 RX ORDER — LANCETS
EACH MISCELLANEOUS
Qty: 100 EACH | Refills: 1 | Status: SHIPPED | OUTPATIENT
Start: 2022-02-04

## 2022-02-04 RX ORDER — BLOOD-GLUCOSE METER
EACH MISCELLANEOUS
Qty: 1 KIT | Refills: 1 | Status: SHIPPED | OUTPATIENT
Start: 2022-02-04

## 2022-02-15 ENCOUNTER — TELEPHONE (OUTPATIENT)
Dept: ADMINISTRATIVE | Facility: OTHER | Age: 72
End: 2022-02-15

## 2022-02-15 NOTE — TELEPHONE ENCOUNTER
----- Message from Lopez Artis LPN sent at 5/03/8421  9:57 AM EST -----  Regarding: Mammogram  02/15/22 9:57 AM    Hello, our patient Michael Adams has had Mammogram completed/performed  Please assist in updating the patient chart by pulling the Care Everywhere (CE) document  The date of service is 12/13/2021       Thank you,  MONY Rowe [facility] [address]

## 2022-02-16 ENCOUNTER — OFFICE VISIT (OUTPATIENT)
Dept: INTERNAL MEDICINE CLINIC | Facility: CLINIC | Age: 72
End: 2022-02-16
Payer: COMMERCIAL

## 2022-02-16 VITALS
HEART RATE: 89 BPM | DIASTOLIC BLOOD PRESSURE: 80 MMHG | HEIGHT: 60 IN | OXYGEN SATURATION: 93 % | TEMPERATURE: 99.1 F | SYSTOLIC BLOOD PRESSURE: 124 MMHG | BODY MASS INDEX: 27.33 KG/M2 | WEIGHT: 139.2 LBS | RESPIRATION RATE: 16 BRPM

## 2022-02-16 DIAGNOSIS — G35 MULTIPLE SCLEROSIS (HCC): ICD-10-CM

## 2022-02-16 DIAGNOSIS — Z13.31 DEPRESSION SCREENING: ICD-10-CM

## 2022-02-16 DIAGNOSIS — E03.9 HYPOTHYROIDISM, ACQUIRED: ICD-10-CM

## 2022-02-16 DIAGNOSIS — E78.2 MIXED HYPERLIPIDEMIA: ICD-10-CM

## 2022-02-16 DIAGNOSIS — Z00.00 MEDICARE ANNUAL WELLNESS VISIT, SUBSEQUENT: Primary | ICD-10-CM

## 2022-02-16 DIAGNOSIS — E11.9 TYPE 2 DIABETES MELLITUS WITHOUT COMPLICATION, WITHOUT LONG-TERM CURRENT USE OF INSULIN (HCC): ICD-10-CM

## 2022-02-16 PROCEDURE — 1160F RVW MEDS BY RX/DR IN RCRD: CPT | Performed by: INTERNAL MEDICINE

## 2022-02-16 PROCEDURE — 1125F AMNT PAIN NOTED PAIN PRSNT: CPT | Performed by: INTERNAL MEDICINE

## 2022-02-16 PROCEDURE — G0444 DEPRESSION SCREEN ANNUAL: HCPCS | Performed by: INTERNAL MEDICINE

## 2022-02-16 PROCEDURE — 1036F TOBACCO NON-USER: CPT | Performed by: INTERNAL MEDICINE

## 2022-02-16 PROCEDURE — G0439 PPPS, SUBSEQ VISIT: HCPCS | Performed by: INTERNAL MEDICINE

## 2022-02-16 PROCEDURE — 99214 OFFICE O/P EST MOD 30 MIN: CPT | Performed by: INTERNAL MEDICINE

## 2022-02-16 PROCEDURE — 1170F FXNL STATUS ASSESSED: CPT | Performed by: INTERNAL MEDICINE

## 2022-02-16 PROCEDURE — 3725F SCREEN DEPRESSION PERFORMED: CPT | Performed by: INTERNAL MEDICINE

## 2022-02-16 PROCEDURE — 3288F FALL RISK ASSESSMENT DOCD: CPT | Performed by: INTERNAL MEDICINE

## 2022-02-16 RX ORDER — LEVOCETIRIZINE DIHYDROCHLORIDE 5 MG/1
TABLET, FILM COATED ORAL
COMMUNITY
Start: 2022-02-09

## 2022-02-16 NOTE — PROGRESS NOTES
Assessment/Plan:       Chronic problems are stable  Tried to tighten up the sugar little more  Continue current medications  Keep working on diet  Ordered labs for next visit  Would make an appointment with her dermatologist to check the lesion on her back  Quality Measures:       Return in about 4 months (around 6/16/2022) for Recheck  No problem-specific Assessment & Plan notes found for this encounter  Diagnoses and all orders for this visit:    Medicare annual wellness visit, subsequent    Type 2 diabetes mellitus without complication, without long-term current use of insulin ([facility] [address] )  -     Comprehensive metabolic panel; Future  -     CBC and differential; Future  -     Hemoglobin A1C; Future  -     Lipid panel; Future  -     Microalbumin / creatinine urine ratio; Future    Hypothyroidism, acquired  -     T4, free; Future  -     TSH, 3rd generation; Future    Multiple sclerosis (HCC)    Mixed hyperlipidemia    Other orders  -     levocetirizine (XYZAL) 5 MG tablet          Subjective:      Patient ID: Devin Herrera is a 73 y o  female  Patient comes in today for routine follow-up and Medicare wellness  Her blood work shows her sugars are slightly above control  She has been trying to improve her diet  Taking her medicines as directed  Thyroid levels have been controlled  Blood pressure has been controlled        ALLERGIES:  Allergies   Allergen Reactions    Bee Venom Shortness Of Breath and Swelling    Latex Rash       CURRENT MEDICATIONS:    Current Outpatient Medications:     Accu-Chek Marsha Plus test strip, TEST BLOOD SUGAR ONE TIME DAILY AS DIRECTED, Disp: 100 strip, Rfl: 1    Accu-Chek Softclix Lancets lancets, TEST BLOOD SUGAR ONE TIME DAILY AS DIRECTED, Disp: 100 each, Rfl: 1    Blood Glucose Monitoring Suppl (Accu-Chek Marsha Plus) w/Device KIT, USE AS DIRECTED, Disp: 1 kit, Rfl: 1    busPIRone (BUSPAR) 10 mg tablet, Take 1 tablet (10 mg total) by mouth 2 (two) times a day as needed (anxiety), Disp: 60 tablet, Rfl: 3    cholecalciferol (VITAMIN D3) 1,000 units tablet, Take 1,000 Units by mouth daily, Disp: , Rfl:     fluticasone (FLONASE) 50 mcg/act nasal spray, , Disp: , Rfl:     ibandronate (BONIVA) 150 MG tablet, Take 1 tablet (150 mg total) by mouth every 30 (thirty) days, Disp: 3 tablet, Rfl: 3    levocetirizine (XYZAL) 5 MG tablet, , Disp: , Rfl:     levothyroxine 50 mcg tablet, TAKE 1 TABLET EVERY DAY, Disp: 90 tablet, Rfl: 2    meloxicam (MOBIC) 7 5 mg tablet, Take 1 tablet (7 5 mg total) by mouth daily, Disp: 90 tablet, Rfl: 1    metFORMIN (GLUCOPHAGE) 1000 MG tablet, TAKE 1 TABLET TWICE DAILY WITH MEALS, Disp: 180 tablet, Rfl: 1    nystatin (MYCOSTATIN) cream, Apply topically 2 (two) times a day, Disp: 30 g, Rfl: 1    rosuvastatin (CRESTOR) 10 MG tablet, TAKE 1 TABLET AT BEDTIME, Disp: 90 tablet, Rfl: 1    TURMERIC PO, Take by mouth, Disp: , Rfl:     ACTIVE PROBLEM LIST:  Patient Active Problem List   Diagnosis    Basal cell carcinoma of back    Multiple sclerosis (HCC)    Hypothyroidism, acquired    Mixed hyperlipidemia    Age related osteoporosis    Type 2 diabetes mellitus without complication, without long-term current use of insulin (HCC)       PAST MEDICAL HISTORY:  History reviewed  No pertinent past medical history      PAST SURGICAL HISTORY:  Past Surgical History:   Procedure Laterality Date    MYRINGOTOMY W/ TUBES         FAMILY HISTORY:  Family History   Problem Relation Age of Onset    Thyroid cancer Mother     Prostate cancer Father        SOCIAL HISTORY:  Social History     Socioeconomic History    Marital status: /Civil Union     Spouse name: Not on file    Number of children: Not on file    Years of education: Not on file    Highest education level: Not on file   Occupational History    Not on file   Tobacco Use    Smoking status: Never Smoker    Smokeless tobacco: Never Used   Substance and Sexual Activity    Alcohol use: Not on file    Drug use: Not on file    Sexual activity: Not on file   Other Topics Concern    Not on file   Social History Narrative    Not on file     Social Determinants of Health     Financial Resource Strain: Not on file   Food Insecurity: Not on file   Transportation Needs: Not on file   Physical Activity: Not on file   Stress: Not on file   Social Connections: Not on file   Intimate Partner Violence: Not on file   Housing Stability: Not on file       Review of Systems   Respiratory: Negative for shortness of breath  Cardiovascular: Negative for chest pain  Gastrointestinal: Negative for abdominal pain  Objective:  Vitals:    02/16/22 1531   BP: 124/80   BP Location: Left arm   Patient Position: Sitting   Cuff Size: Adult   Pulse: 89   Resp: 16   Temp: 99 1 °F (37 3 °C)   TempSrc: Tympanic   SpO2: 93%   Weight: 63 1 kg (139 lb 3 2 oz)   Height: 5' (1 524 m)     Body mass index is 27 19 kg/m²  Physical Exam  Vitals and nursing note reviewed  Constitutional:       Appearance: She is well-developed  Cardiovascular:      Rate and Rhythm: Normal rate and regular rhythm  Heart sounds: Normal heart sounds  Pulmonary:      Effort: Pulmonary effort is normal       Breath sounds: Normal breath sounds  Abdominal:      Palpations: Abdomen is soft  Tenderness: There is no abdominal tenderness  Neurological:      Mental Status: She is alert and oriented to person, place, and time             RESULTS:    Recent Results (from the past 1008 hour(s))   Comprehensive metabolic panel    Collection Time: 01/21/22  9:15 AM   Result Value Ref Range    Sodium 136 136 - 145 mmol/L    Potassium 4 4 3 5 - 5 3 mmol/L    Chloride 105 100 - 108 mmol/L    CO2 26 21 - 32 mmol/L    ANION GAP 5 4 - 13 mmol/L    BUN 16 5 - 25 mg/dL    Creatinine 0 75 0 60 - 1 30 mg/dL    Glucose, Fasting 182 (H) 65 - 99 mg/dL    Calcium 9 2 8 3 - 10 1 mg/dL    AST 31 5 - 45 U/L    ALT 56 12 - 78 U/L    Alkaline Phosphatase 82 46 - 116 U/L    Total Protein 8 1 6 4 - 8 2 g/dL    Albumin 4 4 3 5 - 5 0 g/dL    Total Bilirubin 0 41 0 20 - 1 00 mg/dL    eGFR 81 ml/min/1 73sq m   CBC and differential    Collection Time: 01/21/22  9:15 AM   Result Value Ref Range    WBC 7 92 4 31 - 10 16 Thousand/uL    RBC 4 45 3 81 - 5 12 Million/uL    Hemoglobin 12 2 11 5 - 15 4 g/dL    Hematocrit 39 0 34 8 - 46 1 %    MCV 88 82 - 98 fL    MCH 27 4 26 8 - 34 3 pg    MCHC 31 3 (L) 31 4 - 37 4 g/dL    RDW 13 9 11 6 - 15 1 %    MPV 11 8 8 9 - 12 7 fL    Platelets 363 711 - 232 Thousands/uL    nRBC 0 /100 WBCs    Neutrophils Relative 61 43 - 75 %    Immat GRANS % 0 0 - 2 %    Lymphocytes Relative 29 14 - 44 %    Monocytes Relative 7 4 - 12 %    Eosinophils Relative 2 0 - 6 %    Basophils Relative 1 0 - 1 %    Neutrophils Absolute 4 80 1 85 - 7 62 Thousands/µL    Immature Grans Absolute 0 03 0 00 - 0 20 Thousand/uL    Lymphocytes Absolute 2 29 0 60 - 4 47 Thousands/µL    Monocytes Absolute 0 58 0 17 - 1 22 Thousand/µL    Eosinophils Absolute 0 15 0 00 - 0 61 Thousand/µL    Basophils Absolute 0 07 0 00 - 0 10 Thousands/µL   Hemoglobin A1C    Collection Time: 01/21/22  9:15 AM   Result Value Ref Range    Hemoglobin A1C 7 3 (H) Normal 3 8-5 6%; PreDiabetic 5 7-6 4%; Diabetic >=6 5%; Glycemic control for adults with diabetes <7 0% %     mg/dl   Lipid panel    Collection Time: 01/21/22  9:15 AM   Result Value Ref Range    Cholesterol 138 See Comment mg/dL    Triglycerides 191 (H) See Comment mg/dL    HDL, Direct 34 (L) >=50 mg/dL    LDL Calculated 66 0 - 100 mg/dL    Non-HDL-Chol (CHOL-HDL) 104 mg/dl       This note was created with voice recognition software  Phonic, grammatical and spelling errors may be present within the note as a result

## 2022-02-16 NOTE — PROGRESS NOTES
Assessment and Plan:     Problem List Items Addressed This Visit     None        BMI Counseling: Body mass index is 27 19 kg/m²  The BMI is above normal  Nutrition recommendations include consuming healthier snacks  Rationale for BMI follow-up plan is due to patient being overweight or obese  Depression Screening and Follow-up Plan: Patient was screened for depression during today's encounter  They screened negative with a PHQ-2 score of 0  Preventive health issues were discussed with patient, and age appropriate screening tests were ordered as noted in patient's After Visit Summary  Personalized health advice and appropriate referrals for health education or preventive services given if needed, as noted in patient's After Visit Summary  History of Present Illness:     Patient presents for Welcome to Medicare visit  Patient Care Team:  Kristy White MD as PCP - General (Internal Medicine)  Chen Zavala MD     Review of Systems:     Review of Systems   Respiratory: Negative for shortness of breath  Cardiovascular: Negative for chest pain  Gastrointestinal: Negative for abdominal pain  Problem List:     Patient Active Problem List   Diagnosis    Basal cell carcinoma of back    Multiple sclerosis ([facility] [address] )    Hypothyroidism, acquired    Mixed hyperlipidemia    Age related osteoporosis    Type 2 diabetes mellitus without complication, without long-term [facility]      Past Medical and Surgical History:     History reviewed  No pertinent past medical history    Past Surgical History:   Procedure Laterality Date    MYRINGOTOMY W/ TUBES        Family History:     Family History   Problem Relation Age of Onset    Thyroid cancer Mother     Prostate cancer Father       Social History:     Social History     Socioeconomic History    Marital status: /Civil Union     Spouse name: None    Number of children: None    Years of education: None    Highest education level: None   Occupational History    None   Tobacco Use    Smoking status: Never Smoker    Smokeless tobacco: Never Used   Substance and Sexual Activity    Alcohol use: None    Drug use: None    Sexual activity: None   Other Topics Concern    None   Social History Narrative    None     Social Determinants of Health     Financial Resource Strain: Not on file   Food Insecurity: Not on file   Transportation Needs: Not on file   Physical Activity: Not on file   Stress: Not on file   Social Connections: Not on file   Intimate Partner Violence: Not on file   Housing Stability: Not on file      Medications and Allergies:     Current Outpatient Medications   Medication Sig Dispense Refill    Accu-Chek Marsha Plus test strip TEST BLOOD SUGAR ONE TIME DAILY AS DIRECTED 100 strip 1    Accu-Chek Softclix Lancets lancets TEST BLOOD SUGAR ONE TIME DAILY AS DIRECTED 100 each 1    Blood Glucose Monitoring Suppl (Accu-Chek Marsha Plus) w/Device KIT USE AS DIRECTED 1 kit 1    busPIRone (BUSPAR) 10 mg tablet Take 1 tablet (10 mg total) by mouth 2 (two) times a day as needed (anxiety) 60 tablet 3    cholecalciferol (VITAMIN D3) 1,000 units tablet Take 1,000 Units by mouth daily      fluticasone (FLONASE) 50 mcg/act nasal spray       ibandronate (BONIVA) 150 MG tablet Take 1 tablet (150 mg total) by mouth every 30 (thirty) days 3 tablet 3    levocetirizine (XYZAL) 5 MG tablet       levothyroxine 50 mcg tablet TAKE 1 TABLET EVERY DAY 90 tablet 2    meloxicam (MOBIC) 7 5 mg tablet Take 1 tablet (7 5 mg total) by mouth daily 90 tablet 1    metFORMIN (GLUCOPHAGE) 1000 MG tablet TAKE 1 TABLET TWICE DAILY WITH MEALS 180 tablet 1    nystatin (MYCOSTATIN) cream Apply topically 2 (two) times a day 30 g 1    rosuvastatin (CRESTOR) 10 MG tablet TAKE 1 TABLET AT BEDTIME 90 tablet 1    TURMERIC PO Take by mouth       No current facility-administered medications for this visit       Allergies   Allergen Reactions    Bee Venom Shortness Of Breath and Swelling    Latex Rash      Immunizations:     Immunization History   Administered Date(s) Administered    COVID-19 PFIZER VACCINE 0 3 ML IM 03/26/2021, 04/16/2021, 10/20/2021    INFLUENZA 09/20/2016, 01/21/2018, 09/24/2021    Influenza, high dose seasonal 0 7 mL 01/25/2019, 11/13/2019, 09/17/2020    Pneumococcal Conjugate 13-Valent 12/11/2015, 06/27/2018    Pneumococcal Polysaccharide PPV23 07/01/2019    Tdap 02/02/2018    Tuberculin Skin Test-PPD Intradermal 05/17/2017      Health Maintenance:         Topic Date Due    Breast Cancer Screening: Mammogram  12/04/2021    Colorectal Cancer Screening  04/10/2022    Hepatitis C Screening  Completed     There are no preventive care reminders to display for this patient  Medicare Screening Tests and Risk Assessments:     Bety is here for her Subsequent Wellness visit  Health Risk Assessment:   Patient rates overall health as very good  Patient feels that their physical health rating is same  Patient is very satisfied with their life  Eyesight was rated as same  Hearing was rated as same  Patient feels that their emotional and mental health rating is much better  Patients states they are never, rarely angry  Patient states they are sometimes unusually tired/fatigued  Pain experienced in the last 7 days has been some  Patient's pain rating has been 5/10  Patient states that she has experienced no weight loss or gain in last 6 months  Depression Screening:   PHQ-2 Score: 0      Fall Risk Screening: In the past year, patient has experienced: history of falling in past year    Number of falls: 1  Injured during fall?: Yes    Feels unsteady when standing or walking?: No    Worried about falling?: No      Urinary Incontinence Screening:   Patient has not leaked urine accidently in the last six months  Home Safety:  Patient does not have trouble with stairs inside or outside of their home   Patient has working smoke alarms and has working carbon monoxide detector  Home safety hazards include: none  Nutrition:   Current diet is Diabetic  Medications:   Patient is currently taking over-the-counter supplements  OTC medications include: see medication list  Patient is able to manage medications  Activities of Daily Living (ADLs)/Instrumental Activities of Daily Living (IADLs):   Walk and transfer into and out of bed and chair?: Yes  Dress and groom yourself?: Yes    Bathe or shower yourself?: Yes    Feed yourself?  Yes  Do your laundry/housekeeping?: Yes  Manage your money, pay your bills and track your expenses?: Yes  Make your own meals?: Yes    Do your own shopping?: Yes    Previous Hospitalizations:   Any hospitalizations or ED visits within the last 12 months?: Yes    How many hospitalizations have you had in the last year?: 1-2    Advance Care Planning:   Living will: No    Advanced directive: No    Advanced directive counseling given: Yes      Cognitive Screening:   Provider or family/friend/caregiver concerned regarding cognition?: No    PREVENTIVE SCREENINGS      Cardiovascular Screening:    General: Screening Not Indicated and History Lipid Disorder      Diabetes Screening:     General: Screening Not Indicated and History Diabetes      Colorectal Cancer Screening:     General: Screening Current      Breast Cancer Screening:     General: Screening Current      Cervical Cancer Screening:    General: Screening Not Indicated      Osteoporosis Screening:    General: Screening Not Indicated and History Osteoporosis      Abdominal Aortic Aneurysm (AAA) Screening:        General: Screening Not Indicated      Lung Cancer Screening:     General: Screening Not Indicated      Hepatitis C Screening:    General: Screening Current    Screening, Brief Intervention, and Referral to Treatment (SBIRT)    Screening      AUDIT-C Screenin) How often did you have a drink containing alcohol in the past year? never  2) How many drinks did you have on a typical day when you were drinking in the past year? 0  3) How often did you have 6 or more drinks on one occasion in the past year? never    AUDIT-C Score: 0  Interpretation: Score 0-2 (female): Negative screen for alcohol misuse    Single Item Drug Screening:  How often have you used an illegal drug (including marijuana) or a prescription medication for non-medical reasons in the past year? never    Single Item Drug Screen Score: 0  Interpretation: Negative screen for possible drug use disorder    Brief Intervention  Alcohol & drug use screenings were reviewed  No concerns regarding substance use disorder identified       No exam data present     Physical Exam:     Ht 5' (1 524 m)   BMI 26 80 kg/m²     Physical Exam     Henry Chester MD

## 2022-02-16 NOTE — PATIENT INSTRUCTIONS
Medicare Preventive Visit Patient Instructions  Thank you for completing your Welcome to Medicare Visit or Medicare Annual Wellness Visit today  Your next wellness visit will be due in one year (2/17/2023)  The screening/preventive services that you may require over the next 5-10 years are detailed below  Some tests may not apply to you based off risk factors and/or age  Screening tests ordered at today's visit but not completed yet may show as past due  Also, please note that scanned in results may not display below  Preventive Screenings:  Service Recommendations Previous Testing/Comments   Colorectal Cancer Screening  * Colonoscopy    * Fecal Occult Blood Test (FOBT)/Fecal Immunochemical Test (FIT)  * Fecal DNA/Cologuard Test  * Flexible Sigmoidoscopy Age: 54-65 years old   Colonoscopy: every 10 years (may be performed more frequently if at higher risk)  OR  FOBT/FIT: every 1 year  OR  Cologuard: every 3 years  OR  Sigmoidoscopy: every 5 years  Screening may be recommended earlier than age 48 if at higher risk for colorectal cancer  Also, an individualized decision between you and your healthcare provider will decide whether screening between the ages of 74-80 would be appropriate  Colonoscopy: Not on file  FOBT/FIT: Not on file  Cologuard: 04/10/2019  Sigmoidoscopy: Not on file    Screening Current     Breast Cancer Screening Age: 36 years old  Frequency: every 1-2 years  Not required if history of left and right mastectomy Mammogram: 12/04/2020    Screening Current   Cervical Cancer Screening Between the ages of 21-29, pap smear recommended once every 3 years  Between the ages of 33-67, can perform pap smear with HPV co-testing every 5 years     Recommendations may differ for women with a history of total hysterectomy, cervical cancer, or abnormal pap smears in past  Pap Smear: Not on file    Screening Not Indicated   Hepatitis C Screening Once for adults born between 1945 and 1965  More frequently in patients at high risk for Hepatitis C Hep C Antibody: 01/28/2019    Screening Current   Diabetes Screening 1-2 times per year if you're at risk for diabetes or have pre-diabetes Fasting glucose: 182 mg/dL   A1C: 7 3 %    Screening Not Indicated  History Diabetes   Cholesterol Screening Once every 5 years if you don't have a lipid disorder  May order more often based on risk factors  Lipid panel: 01/21/2022    Screening Not Indicated  History Lipid Disorder     Other Preventive Screenings Covered by Medicare:  1  Abdominal Aortic Aneurysm (AAA) Screening: covered once if your at risk  You're considered to be at risk if you have a family history of AAA  2  Lung Cancer Screening: covers low dose CT scan once per year if you meet all of the following conditions: (1) Age 50-69; (2) No signs or symptoms of lung cancer; (3) Current smoker or have quit smoking within the last 15 years; (4) You have a tobacco smoking history of at least 30 pack years (packs per day multiplied by number of years you smoked); (5) You get a written order from a healthcare provider  3  Glaucoma Screening: covered annually if you're considered high risk: (1) You have diabetes OR (2) Family history of glaucoma OR (3)  aged 48 and older OR (3)  American aged 72 and older  3  Osteoporosis Screening: covered every 2 years if you meet one of the following conditions: (1) You're estrogen deficient and at risk for osteoporosis based off medical history and other findings; (2) Have a vertebral abnormality; (3) On glucocorticoid therapy for more than 3 months; (4) Have primary hyperparathyroidism; (5) On osteoporosis medications and need to assess response to drug therapy  · Last bone density test (DXA Scan): 12/10/2018  5  HIV Screening: covered annually if you're between the age of 12-76  Also covered annually if you are younger than 13 and older than 72 with risk factors for HIV infection   For pregnant patients, it is covered up to 3 times per pregnancy  Immunizations:  Immunization Recommendations   Influenza Vaccine Annual influenza vaccination during flu season is recommended for all persons aged >= 6 months who do not have contraindications   Pneumococcal Vaccine (Prevnar and Pneumovax)  * Prevnar = PCV13  * Pneumovax = PPSV23   Adults 25-60 years old: 1-3 doses may be recommended based on certain risk factors  Adults 72 years old: Prevnar (PCV13) vaccine recommended followed by Pneumovax (PPSV23) vaccine  If already received PPSV23 since turning 65, then PCV13 recommended at least one year after PPSV23 dose  Hepatitis B Vaccine 3 dose series if at intermediate or high risk (ex: diabetes, end stage renal disease, liver disease)   Tetanus (Td) Vaccine - COST NOT COVERED BY MEDICARE PART B Following completion of primary series, a booster dose should be given every 10 years to maintain immunity against tetanus  Td may also be given as tetanus wound prophylaxis  Tdap Vaccine - COST NOT COVERED BY MEDICARE PART B Recommended at least once for all adults  For pregnant patients, recommended with each pregnancy  Shingles Vaccine (Shingrix) - COST NOT COVERED BY MEDICARE PART B  2 shot series recommended in those aged 48 and above     Health Maintenance Due:      Topic Date Due    Breast Cancer Screening: Mammogram  12/04/2021    Colorectal Cancer Screening  04/10/2022    Hepatitis C Screening  Completed     Immunizations Due:  There are no preventive care reminders to display for this patient  Advance Directives   What are advance directives? Advance directives are legal documents that state your wishes and plans for medical care  These plans are made ahead of time in case you lose your ability to make decisions for yourself  Advance directives can apply to any medical decision, such as the treatments you want, and if you want to donate organs  What are the types of advance directives?   There are many types of advance directives, and each state has rules about how to use them  You may choose a combination of any of the following:  · Living will: This is a written record of the treatment you want  You can also choose which treatments you do not want, which to limit, and which to stop at a certain time  This includes surgery, medicine, IV fluid, and tube feedings  · Durable power of  for healthcare [facility]): This is a written record that states who you want to make healthcare choices for you when you are unable to make them for yourself  This person, called a proxy, is usually a family member or a friend  You may choose more than 1 proxy  · Do not resuscitate (DNR) order:  A DNR order is used in case your heart stops beating or you stop breathing  It is a request not to have certain forms of treatment, such as CPR  A DNR order may be included in other types of advance directives  · Medical directive: This covers the care that you want if you are in a coma, near death, or unable to make decisions for yourself  You can list the treatments you want for each condition  Treatment may include pain medicine, surgery, blood transfusions, dialysis, IV or tube feedings, and a ventilator (breathing machine)  · Values history: This document has questions about your views, beliefs, and how you feel and think about life  This information can help others choose the care that you would choose  Why are advance directives important? An advance directive helps you control your care  Although spoken wishes may be used, it is better to have your wishes written down  Spoken wishes can be misunderstood, or not followed  Treatments may be given even if you do not want them  An advance directive may make it easier for your family to make difficult choices about your care  Fall Prevention    Fall prevention  includes ways to make your home and other areas safer  It also includes ways you can move more carefully to prevent a fall  Health conditions that cause changes in your blood pressure, vision, or muscle strength and coordination may increase your risk for falls  Medicines may also increase your risk for falls if they make you dizzy, weak, or sleepy  Fall prevention tips:   · Stand or sit up slowly  · Use assistive devices as directed  · Wear shoes that fit well and have soles that   · Wear a personal alarm  · Stay active  · Manage your medical conditions  Home Safety Tips:  · Add items to prevent falls in the bathroom  · Keep paths clear  · Install bright lights in your home  · Keep items you use often on shelves within reach  · Paint or place reflective tape on the edges of your stairs  Weight Management   Why it is important to manage your weight:  Being overweight increases your risk of health conditions such as heart disease, high blood pressure, type 2 diabetes, and certain types of cancer  It can also increase your risk for osteoarthritis, sleep apnea, and other respiratory problems  Aim for a slow, steady weight loss  Even a small amount of weight loss can lower your risk of health problems  How to lose weight safely:  A safe and healthy way to lose weight is to eat fewer calories and get regular exercise  You can lose up about 1 pound a week by decreasing the number of calories you eat by 500 calories each day  Healthy meal plan for weight management:  A healthy meal plan includes a variety of foods, contains fewer calories, and helps you stay healthy  A healthy meal plan includes the following:  · Eat whole-grain foods more often  A healthy meal plan should contain fiber  Fiber is the part of grains, fruits, and vegetables that is not broken down by your body  Whole-grain foods are healthy and provide extra fiber in your diet  Some examples of whole-grain foods are whole-wheat breads and pastas, oatmeal, brown rice, and bulgur  · Eat a variety of vegetables every day    Include dark, leafy greens such as spinach, kale, keli greens, and mustard greens  Eat yellow and orange vegetables such as carrots, sweet potatoes, and winter squash  · Eat a variety of fruits every day  Choose fresh or canned fruit (canned in its own juice or light syrup) instead of juice  Fruit juice has very little or no fiber  · Eat low-fat dairy foods  Drink fat-free (skim) milk or 1% milk  Eat fat-free yogurt and low-fat cottage cheese  Try low-fat cheeses such as mozzarella and other reduced-fat cheeses  · Choose meat and other protein foods that are low in fat  Choose beans or other legumes such as split peas or lentils  Choose fish, skinless poultry (chicken or turkey), or lean cuts of red meat (beef or pork)  Before you cook meat or poultry, cut off any visible fat  · Use less fat and oil  Try baking foods instead of frying them  Add less fat, such as margarine, sour cream, regular salad dressing and mayonnaise to foods  Eat fewer high-fat foods  Some examples of high-fat foods include french fries, doughnuts, ice cream, and cakes  · Eat fewer sweets  Limit foods and drinks that are high in sugar  This includes candy, cookies, regular soda, and sweetened drinks  Exercise:  Exercise at least 30 minutes per day on most days of the week  Some examples of exercise include walking, biking, dancing, and swimming  You can also fit in more physical activity by taking the stairs instead of the elevator or parking farther away from stores  Ask your healthcare provider about the best exercise plan for you  © Copyright [employer] 2018 Information is for End User's use only and may not be sold, redistributed or otherwise used for commercial purposes   All illustrations and images included in CareNotes® are the copyrighted property of A D A M , Inc  or [address] [place] [address]

## 2022-02-17 ENCOUNTER — TELEPHONE (OUTPATIENT)
Dept: ADMINISTRATIVE | Facility: OTHER | Age: 72
End: 2022-02-17

## 2022-02-17 NOTE — TELEPHONE ENCOUNTER
Upon review of the In Basket request we were able to locate, review, and update the patient chart as requested for Mammogram     Any additional questions or concerns should be emailed to the Practice Liaisons via [email or web address] email, please do not reply via In Basket      Thank you  Keyur Marcum

## 2022-02-17 NOTE — TELEPHONE ENCOUNTER
Upon review of the In Basket request we reviewed the chart and found this was previously resulted  This date of service 12/10/2021 is reflected in HM    Any additional questions or concerns should be emailed to the Practice Liaisons via [email or web address] email, please do not reply via In Basket      Thank you  Natalia Us

## 2022-02-17 NOTE — TELEPHONE ENCOUNTER
----- Message from Byron Lopez LPN sent at 6/28/2286  3:40 PM EST -----  Regarding: Mammogram  02/16/22 3:40 PM    Hello, our patient Juan Sharma has had Mammogram completed/performed  Please assist in updating the patient chart by pulling the Care Everywhere (CE) document  The date of service is 12/13/2021       Thank you,  Byron Lopez LPN  [facility] Jojo Galdamez

## 2022-02-22 ENCOUNTER — OFFICE VISIT (OUTPATIENT)
Dept: DERMATOLOGY | Facility: CLINIC | Age: 72
End: 2022-02-22
Payer: COMMERCIAL

## 2022-02-22 VITALS — BODY MASS INDEX: 27.29 KG/M2 | WEIGHT: 139 LBS | HEIGHT: 60 IN | TEMPERATURE: 97.9 F

## 2022-02-22 DIAGNOSIS — Z85.828 HISTORY OF SKIN CANCER: ICD-10-CM

## 2022-02-22 DIAGNOSIS — Z13.89 SCREENING FOR SKIN CONDITION: ICD-10-CM

## 2022-02-22 DIAGNOSIS — L82.1 SEBORRHEIC KERATOSIS: ICD-10-CM

## 2022-02-22 DIAGNOSIS — L98.9 UNKNOWN SKIN LESION: Primary | ICD-10-CM

## 2022-02-22 PROCEDURE — 88305 TISSUE EXAM BY PATHOLOGIST: CPT | Performed by: STUDENT IN AN ORGANIZED HEALTH CARE EDUCATION/TRAINING PROGRAM

## 2022-02-22 PROCEDURE — 11102 TANGNTL BX SKIN SINGLE LES: CPT | Performed by: DERMATOLOGY

## 2022-02-22 PROCEDURE — 11103 TANGNTL BX SKIN EA SEP/ADDL: CPT | Performed by: DERMATOLOGY

## 2022-02-22 PROCEDURE — 3008F BODY MASS INDEX DOCD: CPT | Performed by: INTERNAL MEDICINE

## 2022-02-22 PROCEDURE — 1160F RVW MEDS BY RX/DR IN RCRD: CPT | Performed by: DERMATOLOGY

## 2022-02-22 PROCEDURE — 99204 OFFICE O/P NEW MOD 45 MIN: CPT | Performed by: DERMATOLOGY

## 2022-02-22 NOTE — PATIENT INSTRUCTIONS
Skin lesions possible basal cell carcinoma question verrucous keratosis rule out atypia await results of biopsy before treatment needed may need to consider excision of the recurrent lesion but it has been over 7 years  Seborrheic keratosis patient reassured these are normal growths we acquire with age no treatment needed  History of skin cancer in no recurrence nothing else atypical sunblock recommended follow-up in 1 year  Screening for dermatologic disorders nothing else of concern noted on complete exam follow-up in 1 year  Wound care instructions given to patient

## 2022-02-22 NOTE — PROGRESS NOTES
[address] [id number]  [phone]  [phone]     MRN: [id number] : 1950  Encounter: [id number]  Patient Information: Ric Valentin  Chief complaint:  Skin checkup and  inflamed cyst    History of present illness:  44-year-old female presents for overall skin check concerned regarding growths on the back  Patient with previous history skin cancers not been seen by me for over 8 years no patient has a growth that is been bothering her and also she was concerned about a red spot right next to a previous basal cell carcinoma we treated previously    History reviewed  No pertinent past medical history  Past Surgical History:   Procedure Laterality Date    MYRINGOTOMY W/ TUBES       Social History   Social History     Substance and Sexual Activity   Alcohol Use None     Social History     Substance and Sexual Activity   Drug Use Not on file     Social History     Tobacco Use   Smoking Status Never Smoker   Smokeless Tobacco Never Used     Family History   Problem Relation Age of Onset    Thyroid cancer Mother     Prostate cancer Father      Meds/Allergies   Allergies   Allergen Reactions    Bee Venom Shortness Of Breath and Swelling    Latex Rash       Meds:  Prior to Admission medications    Medication Sig Start Date End Date Taking?  Authorizing Provider   Accu-Chek Marsha Plus test strip TEST BLOOD SUGAR ONE TIME DAILY AS DIRECTED 22   Vidhya Sanchez MD   Accu-Chek Softclix Lancets lancets TEST BLOOD SUGAR ONE TIME DAILY AS DIRECTED 22   Vidhya Sanchez MD   Blood Glucose Monitoring Suppl (Accu-Chek Marsha Plus) w/Device KIT USE AS DIRECTED 22   Vidhya Sanchez MD   busPIRone (BUSPAR) 10 mg tablet Take 1 tablet (10 mg total) by mouth 2 (two) times a day as needed (anxiety) 21   Vidhya Sanchez MD   cholecalciferol (VITAMIN D3) 1,000 units tablet Take 1,000 Units by mouth daily    Historical Provider, MD fluticasone (FLONASE) 50 mcg/act nasal spray  10/8/19   Historical Provider, MD   ibandronate (BONIVA) 150 MG tablet Take 1 tablet (150 mg total) by mouth every 30 (thirty) days 12/8/20   Kristy White MD   levocetirizine Aleejadon Tovar) 5 MG tablet  2/9/22   Historical Provider, MD   levothyroxine 50 mcg tablet TAKE 1 TABLET EVERY DAY 10/11/21   Kristy White MD   meloxicam JEIMY XIE [facility]) 7 5 mg tablet Take 1 tablet (7 5 mg total) by mouth daily 10/13/21   Kristy White MD   metFORMIN (GLUCOPHAGE) 1000 MG tablet TAKE 1 TABLET TWICE DAILY WITH MEALS 7/29/21   Kristy White MD   nystatin (MYCOSTATIN) cream Apply topically 2 (two) times a day 9/22/21   Kristy White MD   rosuvastatin (CRESTOR) 10 MG tablet TAKE 1 TABLET AT BEDTIME 7/29/21   Kristy White MD   TURMERIC PO Take by mouth    Historical Provider, MD       Subjective:     Review of Systems:    General: negative for - chills, fatigue, fever,  weight gain or weight loss  Psychological: negative for - anxiety, behavioral disorder, concentration difficulties, decreased libido, depression, irritability, memory difficulties, mood swings, sleep disturbances or suicidal ideation  ENT: negative for - hearing difficulties , nasal congestion, nasal discharge, oral lesions, sinus pain, sneezing, sore throat  Allergy and Immunology: negative for - hives, insect bite sensitivity,  Hematological and Lymphatic: negative for - bleeding problems, blood clots,bruising, swollen lymph nodes  Endocrine: negative for - hair pattern changes, hot flashes, malaise/lethargy, mood swings, palpitations, polydipsia/polyuria, skin changes, temperature intolerance or unexpected weight change  Respiratory: negative for - cough, hemoptysis, orthopnea, shortness of breath, or wheezing  Cardiovascular: negative for - chest pain, dyspnea on exertion, edema,  Gastrointestinal: negative for - abdominal pain, nausea/vomiting  Genito-Urinary: negative for - dysuria, incontinence, irregular/heavy menses or urinary frequency/urgency  Musculoskeletal: negative for - gait disturbance, joint pain, joint stiffness, joint swelling, muscle pain, muscular weakness  Dermatological:  As in HPI  Neurological: negative for confusion, dizziness, headaches, impaired coordination/balance, memory loss, numbness/tingling, seizures, speech problems, tremors or weakness       Objective:   Temp 97 9 °F (36 6 °C) (Temporal)   Ht 5' (1 524 m)   Wt 63 kg (139 lb)   BMI 27 15 kg/m²     Physical Exam:    General Appearance:    Alert, cooperative, no distress   Head:    Normocephalic, without obvious abnormality, atraumatic           Skin:   A full skin exam was performed including scalp, head scalp, eyes, ears, nose, lips, neck, chest, axilla, abdomen, back, buttocks, bilateral upper extremities, bilateral lower extremities, hands, feet, fingers, toes, fingernails, and toenails verrucous keratotic papule there is a female size on the upper back small pearly macule noted on the mid back on the os previous scar from previously curetted basal cell carcinoma normal keratotic papules greasy stuck appearance other areas well-healed without signs of recurrence      Shave Biopsy Procedure Note    Pre-operative Diagnosis:  Verrucous keratosis rule out basal cell carcinoma    Plan:  1  Instructed to keep the wound dry and covered for 24 and clean thereafter  2  Warning signs of infection were reviewed  3  Recommended that the patient use OTC acetaminophen as needed for pain  4  Return  Pending results of biopsy(ies)    Locations:  Upper back and midback    Indications:  Suspicious lesion    Anesthesia: Lidocaine 1% with epinephrine without added sodium bicarbonate    Procedure Details     Patient informed of the risks (including bleeding and infection) and benefits of the   procedure and Verbal informed consent obtained  The lesion and surrounding area were given a sterile prep using alcohol and draped in the usual sterile fashion   A Blue blade razor was used to obtain a specimen  Hemostasis achieved with aluminum chloride  Petrolatum and a sterile dressing applied  The specimen was sent for pathologic examination  The patient tolerated the procedure(s) well  Complications:  none  Assessment:     1  Unknown skin lesion     2  Seborrheic keratosis     3  Screening for skin condition     4  History of skin cancer           Plan:   Skin lesions possible basal cell carcinoma question verrucous keratosis rule out atypia await results of biopsy before treatment needed may need to consider excision of the recurrent lesion but it has been over 7 years  Seborrheic keratosis patient reassured these are normal growths we acquire with age no treatment needed  History of skin cancer in no recurrence nothing else atypical sunblock recommended follow-up in 1 year  Screening for dermatologic disorders nothing else of concern noted on complete exam follow-up in 1 year      Elder Gastelum MD  2/22/2022,11:20 AM    Portions of the record may have been created with voice recognition software   Occasional wrong word or "sound a like" substitutions may have occurred due to the inherent limitations of voice recognition software   Read the chart carefully and recognize, using context, where substitutions have occurred

## 2022-02-28 ENCOUNTER — TELEPHONE (OUTPATIENT)
Dept: DERMATOLOGY | Facility: CLINIC | Age: 72
End: 2022-02-28

## 2022-03-21 ENCOUNTER — TELEPHONE (OUTPATIENT)
Dept: INTERNAL MEDICINE CLINIC | Facility: CLINIC | Age: 72
End: 2022-03-21

## 2022-03-21 DIAGNOSIS — M25.512 LEFT SHOULDER PAIN, UNSPECIFIED CHRONICITY: Primary | ICD-10-CM

## 2022-03-21 NOTE — TELEPHONE ENCOUNTER
Patient would like your recommendation for an Orthopedic doctor  Patient is having very bad left shoulder pain and has been living on Ibuprofen  Please advise    Dara Soulier Dara Soulier Dara Soulier

## 2022-03-29 ENCOUNTER — OFFICE VISIT (OUTPATIENT)
Dept: DERMATOLOGY | Facility: CLINIC | Age: 72
End: 2022-03-29
Payer: COMMERCIAL

## 2022-03-29 VITALS — BODY MASS INDEX: 26.5 KG/M2 | HEIGHT: 60 IN | WEIGHT: 135 LBS

## 2022-03-29 DIAGNOSIS — D04.5 SQUAMOUS CELL CARCINOMA IN SITU (SCCIS) OF SKIN OF BACK: Primary | ICD-10-CM

## 2022-03-29 DIAGNOSIS — C44.519 BASAL CELL CARCINOMA (BCC) OF BACK: ICD-10-CM

## 2022-03-29 PROCEDURE — 3008F BODY MASS INDEX DOCD: CPT | Performed by: DERMATOLOGY

## 2022-03-29 PROCEDURE — 17260 DSTRJ MAL LES T/A/L 0.5 CM/<: CPT | Performed by: DERMATOLOGY

## 2022-03-29 NOTE — PROGRESS NOTES
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 and basal cell carcinoma on the back     Objective:   Ht 5' (1 524 m)   Wt 61 2 kg (135 lb)   BMI 26 37 kg/m²     Physical Exam:    General Appearance:    Alert, cooperative, no distress   Skin:   Previous sites biopsy noted  Procedure: Curettage & Electrodessication squamous cell in-situ and basal cell carcinoma  The reasons for the procedure were explained to the patient  The benefits and risks of the procedure were explained to the patient, including bleeding, infection, incomplete removal, prolonged anesthesia (weeks to months) and rarely nerve damage  The patient is aware that a scar will result from the procedure  The consent for the procedure was obtained verbally and in writing  Lesion Site:  Mid upper back and mid midback respectively Curetted Area Size (mm): 4mm/ 3mm    After alcohol prep 1% lidocaine with epinephrine anesthesia  Using a sterile curette, the appropriate area was curetted  The area was curetted and electrodesiccated x3  Final defect size: 5mm/4mm    The wound was left to heal by secondary intention  The wound was cleansed then covered with a dressing  Wound care instructions were verbally given and in writing  I performed the entire procedure  Patient tolerated procedure well  Assessment:     1  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  Authorizing Provider   Accu-Chek Marsha Plus test strip TEST BLOOD SUGAR ONE TIME DAILY AS DIRECTED 2/4/22  Yes Maria Elena Mcnair MD   Accu-Chek Softclix Lancets lancets TEST BLOOD SUGAR ONE TIME DAILY AS DIRECTED 2/4/22  Yes Maria Elena Mcnair MD   Blood Glucose Monitoring Suppl (Accu-Chek Marsha Plus) w/Device KIT USE AS DIRECTED 2/4/22  Yes Maria Elena Mcnair MD   busPIRone (BUSPAR) 10 mg tablet Take 1 tablet (10 mg total) by mouth 2 (two) times a day as needed (anxiety) 1/4/21  Yes Maria Elena Mcnair MD   cholecalciferol (VITAMIN D3) 1,000 units tablet Take 1,000 Units by mouth daily   Yes Historical Provider, MD   fluticasone (FLONASE) 50 mcg/act nasal spray  10/8/19  Yes Historical Provider, MD   ibandronate (BONIVA) 150 MG tablet Take 1 tablet (150 mg total) by mouth every 30 (thirty) days 12/8/20  Yes Maria Elena Mcnair MD   levocetirizine (XYZAL) 5 MG tablet  2/9/22  Yes Historical Provider, MD   levothyroxine 50 mcg tablet TAKE 1 TABLET EVERY DAY 10/11/21  Yes Maria Elena Mcnair MD   metFORMIN (GLUCOPHAGE) 1000 MG tablet TAKE 1 TABLET TWICE DAILY WITH MEALS 7/29/21  Yes Maria Elena Mcnair MD   rosuvastatin (CRESTOR) 10 MG tablet TAKE 1 TABLET AT BEDTIME 7/29/21  Yes Maria Elena Mcnair MD   TURMERIC PO Take by mouth   Yes Historical Provider, MD   meloxicam (MOBIC) 7 5 mg tablet Take 1 tablet (7 5 mg total) by mouth daily 10/13/21   Maria Elena Mcnair MD   nystatin (MYCOSTATIN) cream Apply topically 2 (two) times a day 9/22/21   Maria Elena Mcnair MD     Allergies   Allergen Reactions    Bee Venom Shortness Of Breath and Swelling    Latex Rash       Elder Gastelum MD  3/29/2022,11:58 AM    Portions of the record may have been created with voice recognition software   Occasional wrong word or "sound a like" substitutions may have occurred due to the inherent limitations of voice recognition software   Read the chart carefully and recognize, using context, where substitutions have occurred

## 2022-04-06 ENCOUNTER — TELEPHONE (OUTPATIENT)
Dept: DERMATOLOGY | Facility: CLINIC | Age: 72
End: 2022-04-06

## 2022-04-06 NOTE — TELEPHONE ENCOUNTER
Patient returned phone call  Instructed her to send the pictures through [address]  Went over the signs of infection and what the wound should look like  she said she didn't have any of the symptoms of an infection and looked like how I described it  She will send the pictures to the doctor just in case

## 2022-04-26 DIAGNOSIS — E11.9 TYPE 2 DIABETES MELLITUS WITHOUT COMPLICATION, WITHOUT LONG-TERM CURRENT USE OF INSULIN (HCC): ICD-10-CM

## 2022-04-27 ENCOUNTER — OFFICE VISIT (OUTPATIENT)
Dept: DERMATOLOGY | Facility: CLINIC | Age: 72
End: 2022-04-27
Payer: COMMERCIAL

## 2022-04-27 DIAGNOSIS — C44.519 BASAL CELL CARCINOMA (BCC) OF BACK: ICD-10-CM

## 2022-04-27 DIAGNOSIS — D04.5 SQUAMOUS CELL CARCINOMA IN SITU (SCCIS) OF SKIN OF BACK: Primary | ICD-10-CM

## 2022-04-27 PROCEDURE — 99212 OFFICE O/P EST SF 10 MIN: CPT | Performed by: DERMATOLOGY

## 2022-04-27 NOTE — PROGRESS NOTES
[facility] DERMATOLOGY  [address] [place] [id number]  [phone]  [phone]     MRN: [id number] : 1950  Encounter: [id number]  Patient Information: Víctor Bliss    Subjective:     42-year-old female presents for recheck of previously curetted basal cell carcinoma and squamous cell carcinoma in Situ on the back no problems noted     Objective: There were no vitals taken for this visit  Physical Exam:    General Appearance:    Alert, cooperative, no distress   Skin:   Previous sites of curettage well-healed without evidence of disease     Assessment:     1  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  Authorizing Provider   Accu-Chek Marsha Plus test strip TEST BLOOD SUGAR ONE TIME DAILY AS DIRECTED 22   Clara Dick MD   Accu-Chek Softclix Lancets lancets TEST BLOOD SUGAR ONE TIME DAILY AS DIRECTED 22   Clara Dick MD   Blood Glucose Monitoring Suppl (Accu-Chek Marsha Plus) w/Device KIT USE AS DIRECTED 22   Clara Dick MD   busPIRone (BUSPAR) 10 mg tablet Take 1 tablet (10 mg total) by mouth 2 (two) times a day as needed (anxiety) 21   Clara Dick MD   cholecalciferol (VITAMIN D3) 1,000 units tablet Take 1,000 Units by mouth daily    Historical Provider, MD   fluticasone Ramirez Callkavon) 50 mcg/act nasal spray  10/8/19   Historical Provider, MD   ibandronate (BONIVA) 150 MG tablet Take 1 tablet (150 mg total) by mouth every 30 (thirty) days 20   Clara Dick MD   levocetirizine (XYZAL) 5 MG tablet  22   Historical Provider, MD   levothyroxine 50 mcg tablet TAKE 1 TABLET EVERY DAY 10/11/21   Clara Dick MD   meloxicam (MOBIC) 7 5 mg tablet Take 1 tablet (7 5 mg total) by mouth daily 10/13/21   Harika Patricia Annmarie Schmitz MD   metFORMIN (GLUCOPHAGE) 1000 MG tablet TAKE 1 TABLET TWICE DAILY WITH MEALS 4/26/22   Josr Jarvis MD   nystatin (MYCOSTATIN) cream Apply topically 2 (two) times a day 9/22/21   Celso Phoenix, MD   rosuvastatin (CRESTOR) 10 MG tablet TAKE 1 TABLET AT BEDTIME 7/29/21   Celso Phoenix, MD   TURMERIC PO Take by mouth    Historical Provider, MD     Allergies   Allergen Reactions    Bee Venom Shortness Of Breath and Swelling    Latex Rash       Wanda Osorio MD  4/27/2022,11:59 AM    Portions of the record may have been created with voice recognition software   Occasional wrong word or "sound a like" substitutions may have occurred due to the inherent limitations of voice recognition software   Read the chart carefully and recognize, using context, where substitutions have occurred

## 2022-05-06 DIAGNOSIS — Z12.11 SCREENING FOR MALIGNANT NEOPLASM OF COLON: Primary | ICD-10-CM

## 2022-05-24 LAB — COLOGUARD RESULT REPORTABLE: NEGATIVE

## 2022-05-31 ENCOUNTER — APPOINTMENT (OUTPATIENT)
Dept: LAB | Facility: CLINIC | Age: 72
End: 2022-05-31
Payer: COMMERCIAL

## 2022-05-31 DIAGNOSIS — E11.9 TYPE 2 DIABETES MELLITUS WITHOUT COMPLICATION, WITHOUT LONG-TERM CURRENT USE OF INSULIN (HCC): ICD-10-CM

## 2022-05-31 DIAGNOSIS — E03.9 HYPOTHYROIDISM, ACQUIRED: ICD-10-CM

## 2022-05-31 LAB
ALBUMIN SERPL BCP-MCNC: 4.2 G/DL (ref 3.5–5)
ALP SERPL-CCNC: 84 U/L (ref 46–116)
ALT SERPL W P-5'-P-CCNC: 53 U/L (ref 12–78)
ANION GAP SERPL CALCULATED.3IONS-SCNC: 3 MMOL/L (ref 4–13)
AST SERPL W P-5'-P-CCNC: 30 U/L (ref 5–45)
BASOPHILS # BLD AUTO: 0.06 THOUSANDS/ΜL (ref 0–0.1)
BASOPHILS NFR BLD AUTO: 1 % (ref 0–1)
BILIRUB SERPL-MCNC: 0.34 MG/DL (ref 0.2–1)
BUN SERPL-MCNC: 11 MG/DL (ref 5–25)
CALCIUM SERPL-MCNC: 10 MG/DL (ref 8.3–10.1)
CHLORIDE SERPL-SCNC: 105 MMOL/L (ref 100–108)
CHOLEST SERPL-MCNC: 145 MG/DL
CO2 SERPL-SCNC: 27 MMOL/L (ref 21–32)
CREAT SERPL-MCNC: 0.83 MG/DL (ref 0.6–1.3)
CREAT UR-MCNC: 104 MG/DL
EOSINOPHIL # BLD AUTO: 0.17 THOUSAND/ΜL (ref 0–0.61)
EOSINOPHIL NFR BLD AUTO: 2 % (ref 0–6)
ERYTHROCYTE [DISTWIDTH] IN BLOOD BY AUTOMATED COUNT: 14 % (ref 11.6–15.1)
EST. AVERAGE GLUCOSE BLD GHB EST-MCNC: 171 MG/DL
GFR SERPL CREATININE-BSD FRML MDRD: 72 ML/MIN/1.73SQ M
GLUCOSE P FAST SERPL-MCNC: 185 MG/DL (ref 65–99)
HBA1C MFR BLD: 7.6 %
HCT VFR BLD AUTO: 38.7 % (ref 34.8–46.1)
HDLC SERPL-MCNC: 38 MG/DL
HGB BLD-MCNC: 12.4 G/DL (ref 11.5–15.4)
IMM GRANULOCYTES # BLD AUTO: 0.03 THOUSAND/UL (ref 0–0.2)
IMM GRANULOCYTES NFR BLD AUTO: 0 % (ref 0–2)
LDLC SERPL CALC-MCNC: 68 MG/DL (ref 0–100)
LYMPHOCYTES # BLD AUTO: 2.27 THOUSANDS/ΜL (ref 0.6–4.47)
LYMPHOCYTES NFR BLD AUTO: 28 % (ref 14–44)
MCH RBC QN AUTO: 28.2 PG (ref 26.8–34.3)
MCHC RBC AUTO-ENTMCNC: 32 G/DL (ref 31.4–37.4)
MCV RBC AUTO: 88 FL (ref 82–98)
MICROALBUMIN UR-MCNC: 107 MG/L (ref 0–20)
MICROALBUMIN/CREAT 24H UR: 103 MG/G CREATININE (ref 0–30)
MONOCYTES # BLD AUTO: 0.57 THOUSAND/ΜL (ref 0.17–1.22)
MONOCYTES NFR BLD AUTO: 7 % (ref 4–12)
NEUTROPHILS # BLD AUTO: 5 THOUSANDS/ΜL (ref 1.85–7.62)
NEUTS SEG NFR BLD AUTO: 62 % (ref 43–75)
NONHDLC SERPL-MCNC: 107 MG/DL
NRBC BLD AUTO-RTO: 0 /100 WBCS
PLATELET # BLD AUTO: 237 THOUSANDS/UL (ref 149–390)
PMV BLD AUTO: 12 FL (ref 8.9–12.7)
POTASSIUM SERPL-SCNC: 3.9 MMOL/L (ref 3.5–5.3)
PROT SERPL-MCNC: 8.1 G/DL (ref 6.4–8.2)
RBC # BLD AUTO: 4.39 MILLION/UL (ref 3.81–5.12)
SODIUM SERPL-SCNC: 135 MMOL/L (ref 136–145)
T4 FREE SERPL-MCNC: 1.01 NG/DL (ref 0.76–1.46)
TRIGL SERPL-MCNC: 196 MG/DL
TSH SERPL DL<=0.05 MIU/L-ACNC: 2.15 UIU/ML (ref 0.45–4.5)
WBC # BLD AUTO: 8.1 THOUSAND/UL (ref 4.31–10.16)

## 2022-05-31 PROCEDURE — 84439 ASSAY OF FREE THYROXINE: CPT

## 2022-05-31 PROCEDURE — 80053 COMPREHEN METABOLIC PANEL: CPT

## 2022-05-31 PROCEDURE — 3051F HG A1C>EQUAL 7.0%<8.0%: CPT | Performed by: DERMATOLOGY

## 2022-05-31 PROCEDURE — 84443 ASSAY THYROID STIM HORMONE: CPT

## 2022-05-31 PROCEDURE — 36415 COLL VENOUS BLD VENIPUNCTURE: CPT

## 2022-05-31 PROCEDURE — 85025 COMPLETE CBC W/AUTO DIFF WBC: CPT

## 2022-05-31 PROCEDURE — 82043 UR ALBUMIN QUANTITATIVE: CPT

## 2022-05-31 PROCEDURE — 80061 LIPID PANEL: CPT

## 2022-05-31 PROCEDURE — 83036 HEMOGLOBIN GLYCOSYLATED A1C: CPT

## 2022-05-31 PROCEDURE — 82570 ASSAY OF URINE CREATININE: CPT

## 2022-05-31 PROCEDURE — 3060F POS MICROALBUMINURIA REV: CPT | Performed by: DERMATOLOGY

## 2022-06-14 ENCOUNTER — OFFICE VISIT (OUTPATIENT)
Dept: INTERNAL MEDICINE CLINIC | Facility: CLINIC | Age: 72
End: 2022-06-14
Payer: COMMERCIAL

## 2022-06-14 VITALS
HEART RATE: 85 BPM | BODY MASS INDEX: 27.29 KG/M2 | OXYGEN SATURATION: 99 % | RESPIRATION RATE: 14 BRPM | WEIGHT: 139 LBS | DIASTOLIC BLOOD PRESSURE: 76 MMHG | SYSTOLIC BLOOD PRESSURE: 122 MMHG | HEIGHT: 60 IN

## 2022-06-14 DIAGNOSIS — E78.2 MIXED HYPERLIPIDEMIA: ICD-10-CM

## 2022-06-14 DIAGNOSIS — E11.9 TYPE 2 DIABETES MELLITUS WITHOUT COMPLICATION, WITHOUT LONG-TERM CURRENT USE OF INSULIN (HCC): Primary | ICD-10-CM

## 2022-06-14 DIAGNOSIS — M54.42 CHRONIC LOW BACK PAIN WITH BILATERAL SCIATICA, UNSPECIFIED BACK PAIN LATERALITY: ICD-10-CM

## 2022-06-14 DIAGNOSIS — G89.29 CHRONIC LOW BACK PAIN WITH BILATERAL SCIATICA, UNSPECIFIED BACK PAIN LATERALITY: ICD-10-CM

## 2022-06-14 DIAGNOSIS — E03.9 HYPOTHYROIDISM, ACQUIRED: ICD-10-CM

## 2022-06-14 DIAGNOSIS — F41.9 ANXIETY: ICD-10-CM

## 2022-06-14 DIAGNOSIS — M54.41 CHRONIC LOW BACK PAIN WITH BILATERAL SCIATICA, UNSPECIFIED BACK PAIN LATERALITY: ICD-10-CM

## 2022-06-14 DIAGNOSIS — G56.02 CARPAL TUNNEL SYNDROME OF LEFT WRIST: ICD-10-CM

## 2022-06-14 PROCEDURE — 99214 OFFICE O/P EST MOD 30 MIN: CPT | Performed by: INTERNAL MEDICINE

## 2022-06-14 PROCEDURE — 3008F BODY MASS INDEX DOCD: CPT | Performed by: INTERNAL MEDICINE

## 2022-06-14 PROCEDURE — 1160F RVW MEDS BY RX/DR IN RCRD: CPT | Performed by: INTERNAL MEDICINE

## 2022-06-14 PROCEDURE — 1036F TOBACCO NON-USER: CPT | Performed by: INTERNAL MEDICINE

## 2022-06-14 RX ORDER — BUSPIRONE HYDROCHLORIDE 10 MG/1
10 TABLET ORAL 2 TIMES DAILY PRN
Qty: 180 TABLET | Refills: 3 | Status: SHIPPED | OUTPATIENT
Start: 2022-06-14

## 2022-06-14 RX ORDER — METHOCARBAMOL 500 MG/1
500 TABLET, FILM COATED ORAL AS NEEDED
COMMUNITY

## 2022-06-14 RX ORDER — CIPROFLOXACIN 500 MG/1
500 TABLET, FILM COATED ORAL 2 TIMES DAILY
COMMUNITY
Start: 2022-06-09

## 2022-06-14 NOTE — PATIENT INSTRUCTIONS
Other providers to check with your insurance,    Dr Candice Nguyen, [address] [place] [address]

## 2022-08-26 ENCOUNTER — VBI (OUTPATIENT)
Dept: ADMINISTRATIVE | Facility: OTHER | Age: 72
End: 2022-08-26

## 2022-10-12 ENCOUNTER — APPOINTMENT (OUTPATIENT)
Dept: LAB | Facility: CLINIC | Age: 72
End: 2022-10-12
Payer: COMMERCIAL

## 2022-10-12 DIAGNOSIS — E03.9 HYPOTHYROIDISM, ACQUIRED: ICD-10-CM

## 2022-10-12 DIAGNOSIS — E11.9 TYPE 2 DIABETES MELLITUS WITHOUT COMPLICATION, WITHOUT LONG-TERM CURRENT USE OF INSULIN (HCC): ICD-10-CM

## 2022-10-12 LAB
ALBUMIN SERPL BCP-MCNC: 4.5 G/DL (ref 3.5–5)
ALP SERPL-CCNC: 82 U/L (ref 46–116)
ALT SERPL W P-5'-P-CCNC: 46 U/L (ref 12–78)
ANION GAP SERPL CALCULATED.3IONS-SCNC: 6 MMOL/L (ref 4–13)
AST SERPL W P-5'-P-CCNC: 23 U/L (ref 5–45)
BASOPHILS # BLD AUTO: 0.06 THOUSANDS/ΜL (ref 0–0.1)
BASOPHILS NFR BLD AUTO: 1 % (ref 0–1)
BILIRUB SERPL-MCNC: 0.53 MG/DL (ref 0.2–1)
BUN SERPL-MCNC: 11 MG/DL (ref 5–25)
CALCIUM SERPL-MCNC: 10.2 MG/DL (ref 8.3–10.1)
CHLORIDE SERPL-SCNC: 105 MMOL/L (ref 96–108)
CHOLEST SERPL-MCNC: 125 MG/DL
CO2 SERPL-SCNC: 24 MMOL/L (ref 21–32)
CREAT SERPL-MCNC: 0.81 MG/DL (ref 0.6–1.3)
EOSINOPHIL # BLD AUTO: 0.17 THOUSAND/ΜL (ref 0–0.61)
EOSINOPHIL NFR BLD AUTO: 3 % (ref 0–6)
ERYTHROCYTE [DISTWIDTH] IN BLOOD BY AUTOMATED COUNT: 13.9 % (ref 11.6–15.1)
EST. AVERAGE GLUCOSE BLD GHB EST-MCNC: 166 MG/DL
GFR SERPL CREATININE-BSD FRML MDRD: 74 ML/MIN/1.73SQ M
GLUCOSE P FAST SERPL-MCNC: 170 MG/DL (ref 65–99)
HBA1C MFR BLD: 7.4 %
HCT VFR BLD AUTO: 40.4 % (ref 34.8–46.1)
HDLC SERPL-MCNC: 41 MG/DL
HGB BLD-MCNC: 12.8 G/DL (ref 11.5–15.4)
IMM GRANULOCYTES # BLD AUTO: 0.02 THOUSAND/UL (ref 0–0.2)
IMM GRANULOCYTES NFR BLD AUTO: 0 % (ref 0–2)
LDLC SERPL CALC-MCNC: 51 MG/DL (ref 0–100)
LYMPHOCYTES # BLD AUTO: 2.08 THOUSANDS/ΜL (ref 0.6–4.47)
LYMPHOCYTES NFR BLD AUTO: 31 % (ref 14–44)
MCH RBC QN AUTO: 28.2 PG (ref 26.8–34.3)
MCHC RBC AUTO-ENTMCNC: 31.7 G/DL (ref 31.4–37.4)
MCV RBC AUTO: 89 FL (ref 82–98)
MONOCYTES # BLD AUTO: 0.46 THOUSAND/ΜL (ref 0.17–1.22)
MONOCYTES NFR BLD AUTO: 7 % (ref 4–12)
NEUTROPHILS # BLD AUTO: 3.86 THOUSANDS/ΜL (ref 1.85–7.62)
NEUTS SEG NFR BLD AUTO: 58 % (ref 43–75)
NONHDLC SERPL-MCNC: 84 MG/DL
NRBC BLD AUTO-RTO: 0 /100 WBCS
PLATELET # BLD AUTO: 257 THOUSANDS/UL (ref 149–390)
PMV BLD AUTO: 12.3 FL (ref 8.9–12.7)
POTASSIUM SERPL-SCNC: 4 MMOL/L (ref 3.5–5.3)
PROT SERPL-MCNC: 8.5 G/DL (ref 6.4–8.4)
RBC # BLD AUTO: 4.54 MILLION/UL (ref 3.81–5.12)
SODIUM SERPL-SCNC: 135 MMOL/L (ref 135–147)
T4 FREE SERPL-MCNC: 1.06 NG/DL (ref 0.76–1.46)
TRIGL SERPL-MCNC: 163 MG/DL
TSH SERPL DL<=0.05 MIU/L-ACNC: 1.73 UIU/ML (ref 0.45–4.5)
WBC # BLD AUTO: 6.65 THOUSAND/UL (ref 4.31–10.16)

## 2022-10-12 PROCEDURE — 83036 HEMOGLOBIN GLYCOSYLATED A1C: CPT

## 2022-10-12 PROCEDURE — 84439 ASSAY OF FREE THYROXINE: CPT

## 2022-10-12 PROCEDURE — 80053 COMPREHEN METABOLIC PANEL: CPT

## 2022-10-12 PROCEDURE — 80061 LIPID PANEL: CPT

## 2022-10-12 PROCEDURE — 85025 COMPLETE CBC W/AUTO DIFF WBC: CPT

## 2022-10-12 PROCEDURE — 84443 ASSAY THYROID STIM HORMONE: CPT

## 2022-10-12 PROCEDURE — 36415 COLL VENOUS BLD VENIPUNCTURE: CPT

## 2022-10-26 ENCOUNTER — OFFICE VISIT (OUTPATIENT)
Dept: INTERNAL MEDICINE CLINIC | Facility: CLINIC | Age: 72
End: 2022-10-26
Payer: COMMERCIAL

## 2022-10-26 VITALS
HEART RATE: 66 BPM | RESPIRATION RATE: 14 BRPM | TEMPERATURE: 98.3 F | OXYGEN SATURATION: 97 % | SYSTOLIC BLOOD PRESSURE: 160 MMHG | HEIGHT: 60 IN | WEIGHT: 135 LBS | BODY MASS INDEX: 26.5 KG/M2 | DIASTOLIC BLOOD PRESSURE: 82 MMHG

## 2022-10-26 DIAGNOSIS — Z23 NEED FOR INFLUENZA VACCINATION: ICD-10-CM

## 2022-10-26 DIAGNOSIS — E03.9 HYPOTHYROIDISM, ACQUIRED: ICD-10-CM

## 2022-10-26 DIAGNOSIS — E78.2 MIXED HYPERLIPIDEMIA: ICD-10-CM

## 2022-10-26 DIAGNOSIS — K21.9 GERD WITHOUT ESOPHAGITIS: ICD-10-CM

## 2022-10-26 DIAGNOSIS — E11.9 TYPE 2 DIABETES MELLITUS WITHOUT COMPLICATION, WITHOUT LONG-TERM CURRENT USE OF INSULIN (HCC): Primary | ICD-10-CM

## 2022-10-26 PROCEDURE — G0008 ADMIN INFLUENZA VIRUS VAC: HCPCS | Performed by: INTERNAL MEDICINE

## 2022-10-26 PROCEDURE — 90662 IIV NO PRSV INCREASED AG IM: CPT | Performed by: INTERNAL MEDICINE

## 2022-10-26 PROCEDURE — 99214 OFFICE O/P EST MOD 30 MIN: CPT | Performed by: INTERNAL MEDICINE

## 2022-10-26 NOTE — PROGRESS NOTES
Assessment/Plan:       Chronic problems appear stable  Continue current medications  For her probable reflux symptoms, suggested starting with Pepcid  Take that for a week or 2  If not improving, let us know  She is going to see an allergist regarding her ear issues  She wants to try that 1st   Ordered labs for next visit  Quality Measures:       Return in about 4 months (around 2/26/2023) for Regular visit and Medicare Wellness  No problem-specific Assessment & Plan notes found for this encounter  Diagnoses and all orders for this visit:    Type 2 diabetes mellitus without complication, without long-term current use of insulin ([facility] [address] )  -     IRIS Diabetic eye exam  -     Comprehensive metabolic panel; Future  -     CBC and differential; Future  -     Hemoglobin A1C; Future  -     Lipid panel; Future    Mixed hyperlipidemia    GERD without esophagitis    Hypothyroidism, acquired  -     T4, free; Future  -     TSH, 3rd generation; Future    Need for influenza vaccination  -     influenza vaccine, high-dose, PF 0 7 mL (FLUZONE HIGH-DOSE)        Subjective:      Patient ID: Monae Fleming is a 73 y o  female  Patient comes in today for routine follow-up  Her blood work shows thyroid levels are controlled  Sugars improved  Cholesterol is controlled  She states she has not been feeling well for while  Getting heartburn  Nauseous at times  Does not feel like eating  Also having trouble again with her ears  She states her ENT wants to put tubes in again  She does not want to do that  She has had it done multiple times  States she wants to see an allergist 1st   No other complaints today  No further additions to her history        ALLERGIES:  Allergies   Allergen Reactions   • Bee Venom Shortness Of Breath and Swelling   • Latex Rash       CURRENT MEDICATIONS:    Current Outpatient Medications:   •  Accu-Chek Marsha Plus test strip, TEST BLOOD SUGAR ONE TIME DAILY AS DIRECTED, Disp: 100 strip, Rfl: 1  •  Accu-Chek Softclix Lancets lancets, TEST BLOOD SUGAR ONE TIME DAILY AS DIRECTED, Disp: 100 each, Rfl: 1  •  Blood Glucose Monitoring Suppl (Accu-Chek Marsha Plus) w/Device KIT, USE AS DIRECTED, Disp: 1 kit, Rfl: 1  •  busPIRone (BUSPAR) 10 mg tablet, Take 1 tablet (10 mg total) by mouth 2 (two) times a day as needed (anxiety), Disp: 180 tablet, Rfl: 3  •  cholecalciferol (VITAMIN D3) 1,000 units tablet, Take 1,000 Units by mouth daily, Disp: , Rfl:   •  Elastic Bandages & Supports (Wrist Brace/Left Medium) MISC, Use in the morning, Disp: 1 each, Rfl: 3  •  fluticasone (FLONASE) 50 mcg/act nasal spray, , Disp: , Rfl:   •  ibandronate (BONIVA) 150 MG tablet, Take 1 tablet (150 mg total) by mouth every 30 (thirty) days, Disp: 3 tablet, Rfl: 3  •  levocetirizine (XYZAL) 5 MG tablet, , Disp: , Rfl:   •  levothyroxine 50 mcg tablet, TAKE 1 TABLET EVERY DAY, Disp: 90 tablet, Rfl: 3  •  metFORMIN (GLUCOPHAGE) 1000 MG tablet, TAKE 1 TABLET TWICE DAILY WITH MEALS, Disp: 180 tablet, Rfl: 1  •  methocarbamol (ROBAXIN) 500 mg tablet, Take 500 mg by mouth if needed for muscle spasms, Disp: , Rfl:   •  rosuvastatin (CRESTOR) 10 MG tablet, TAKE 1 TABLET AT BEDTIME, Disp: 90 tablet, Rfl: 1  •  TURMERIC PO, Take by mouth, Disp: , Rfl:     ACTIVE PROBLEM LIST:  Patient Active Problem List   Diagnosis   • Basal cell carcinoma of back   • Multiple sclerosis (HCC)   • Hypothyroidism, acquired   • Mixed hyperlipidemia   • Age related osteoporosis   • Type 2 diabetes mellitus without complication, without long-term current use of insulin (HCC)       PAST MEDICAL HISTORY:  No past medical history on file      PAST SURGICAL HISTORY:  Past Surgical History:   Procedure Laterality Date   • MYRINGOTOMY W/ TUBES         FAMILY HISTORY:  Family History   Problem Relation Age of Onset   • Thyroid cancer Mother    • Prostate cancer Father        SOCIAL HISTORY:  Social History     Socioeconomic History   • Marital status: /Civil [employer]     Spouse name: Not on file   • Number of children: Not on file   • Years of education: Not on file   • Highest education level: Not on file   Occupational History   • Not on file   Tobacco Use   • Smoking status: Never Smoker   • Smokeless tobacco: Never Used   Vaping Use   • Vaping Use: Never used   Substance and Sexual Activity   • Alcohol use: Not Currently   • Drug use: Never   • Sexual activity: Yes   Other Topics Concern   • Not on file   Social History Narrative   • Not on file     Social Determinants of Health     Financial Resource Strain: Not on file   Food Insecurity: Not on file   Transportation Needs: Not on file   Physical Activity: Not on file   Stress: Not on file   Social Connections: Not on file   Intimate Partner Violence: Not on file   Housing Stability: Not on file       Review of Systems   Respiratory: Negative for shortness of breath  Cardiovascular: Negative for chest pain  Gastrointestinal: Negative for abdominal pain  Objective:  Vitals:    10/26/22 0826   BP: 160/82   Pulse: 66   Resp: 14   Temp: 98 3 °F (36 8 °C)   TempSrc: Tympanic   SpO2: 97%   Weight: 61 2 kg (135 lb)   Height: 5' (1 524 m)     Body mass index is 26 37 kg/m²  Physical Exam  Vitals and nursing note reviewed  Constitutional:       Appearance: She is well-developed  Cardiovascular:      Rate and Rhythm: Normal rate and regular rhythm  Heart sounds: Normal heart sounds  Pulmonary:      Effort: Pulmonary effort is normal       Breath sounds: Normal breath sounds  Neurological:      Mental Status: She is alert and oriented to person, place, and time             RESULTS:    Recent Results (from the past 1008 hour(s))   Comprehensive metabolic panel    Collection Time: 10/12/22  9:16 AM   Result Value Ref Range    Sodium 135 135 - 147 mmol/L    Potassium 4 0 3 5 - 5 3 mmol/L    Chloride 105 96 - 108 mmol/L    CO2 24 21 - 32 mmol/L    ANION GAP 6 4 - 13 mmol/L    BUN 11 5 - 25 mg/dL    Creatinine 0 81 0 60 - 1 30 mg/dL    Glucose, Fasting 170 (H) 65 - 99 mg/dL    Calcium 10 2 (H) 8 3 - 10 1 mg/dL    AST 23 5 - 45 U/L    ALT 46 12 - 78 U/L    Alkaline Phosphatase 82 46 - 116 U/L    Total Protein 8 5 (H) 6 4 - 8 4 g/dL    Albumin 4 5 3 5 - 5 0 g/dL    Total Bilirubin 0 53 0 20 - 1 00 mg/dL    eGFR 74 ml/min/1 73sq m   CBC and differential    Collection Time: 10/12/22  9:16 AM   Result Value Ref Range    WBC 6 65 4 31 - 10 16 Thousand/uL    RBC 4 54 3 81 - 5 12 Million/uL    Hemoglobin 12 8 11 5 - 15 4 g/dL    Hematocrit 40 4 34 8 - 46 1 %    MCV 89 82 - 98 fL    MCH 28 2 26 8 - 34 3 pg    MCHC 31 7 31 4 - 37 4 g/dL    RDW 13 9 11 6 - 15 1 %    MPV 12 3 8 9 - 12 7 fL    Platelets 168 701 - 869 Thousands/uL    nRBC 0 /100 WBCs    Neutrophils Relative 58 43 - 75 %    Immat GRANS % 0 0 - 2 %    Lymphocytes Relative 31 14 - 44 %    Monocytes Relative 7 4 - 12 %    Eosinophils Relative 3 0 - 6 %    Basophils Relative 1 0 - 1 %    Neutrophils Absolute 3 86 1 85 - 7 62 Thousands/µL    Immature Grans Absolute 0 02 0 00 - 0 20 Thousand/uL    Lymphocytes Absolute 2 08 0 60 - 4 47 Thousands/µL    Monocytes Absolute 0 46 0 17 - 1 22 Thousand/µL    Eosinophils Absolute 0 17 0 00 - 0 61 Thousand/µL    Basophils Absolute 0 06 0 00 - 0 10 Thousands/µL   Hemoglobin A1C    Collection Time: 10/12/22  9:16 AM   Result Value Ref Range    Hemoglobin A1C 7 4 (H) Normal 3 8-5 6%; PreDiabetic 5 7-6 4%;  Diabetic >=6 5%; Glycemic control for adults with diabetes <7 0% %     mg/dl   Lipid panel    Collection Time: 10/12/22  9:16 AM   Result Value Ref Range    Cholesterol 125 See Comment mg/dL    Triglycerides 163 (H) See Comment mg/dL    HDL, Direct 41 (L) >=50 mg/dL    LDL Calculated 51 0 - 100 mg/dL    Non-HDL-Chol (CHOL-HDL) 84 mg/dl   T4, free    Collection Time: 10/12/22  9:16 AM   Result Value Ref Range    Free T4 1 06 0 76 - 1 46 ng/dL   TSH, 3rd generation    Collection Time: 10/12/22  9:16 AM   Result Value Ref Range    TSH 3RD [facility] 1 730 0 450 - 4 500 uIU/mL       This note was created with voice recognition software  Phonic, grammatical and spelling errors may be present within the note as a result

## 2022-11-08 ENCOUNTER — TELEMEDICINE (OUTPATIENT)
Dept: INTERNAL MEDICINE CLINIC | Facility: CLINIC | Age: 72
End: 2022-11-08

## 2022-11-08 VITALS — WEIGHT: 135 LBS | HEIGHT: 60 IN | BODY MASS INDEX: 26.5 KG/M2

## 2022-11-08 DIAGNOSIS — U07.1 COVID-19 VIRUS INFECTION: Primary | ICD-10-CM

## 2022-11-08 RX ORDER — NIRMATRELVIR AND RITONAVIR 300-100 MG
3 KIT ORAL 2 TIMES DAILY
Qty: 30 TABLET | Refills: 0 | Status: SHIPPED | OUTPATIENT
Start: 2022-11-08 | End: 2022-11-13

## 2022-11-08 NOTE — PATIENT INSTRUCTIONS
Rest, increase fluids  Tylenol for fever or aches as per package instructions  Start Paxlovid and take until finished  Take with food as it can cause a bad taste in her mouth  It is also been shown that vitamin-D 2000 units daily along with vitamin-C 1000 mg twice daily and zinc 220 mg daily are beneficial for 10 days  Status call later in the week  Important to stay hydrated

## 2022-11-08 NOTE — PROGRESS NOTES
COVID-19 Outpatient Progress Note    Assessment/Plan:   Rest, increase fluids  Tylenol for fever or aches as per package instructions  Start Paxlovid and take until finished  Take with food as it can cause a bad taste in her mouth  It is also been shown that vitamin-D 2000 units daily along with vitamin-C 1000 mg twice daily and zinc 220 mg daily are beneficial for 10 days  Status call later in the week  Important to stay hydrated  Problem List Items Addressed This Visit    None     Visit Diagnoses     COVID-19 virus infection    -  Primary         Disposition:     Discussed symptom directed medication options with patient  Discussed vitamin D, vitamin C, and/or zinc supplementation with patient  Patient started with symptoms late on November 4th  Symptoms have progressed to include low-grade fever, body aches, cough, diarrhea, fatigue  Mild congestion  Tested at home several times most recently yesterday and 3 COVID tests were positive  She has also done a test at [facility] and is awaiting results  Is a , 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  Patient meets criteria for PAXLOVID and they have been counseled appropriately according to EUA documentation released by the FDA  After discussion, patient agrees to treatment  Darinel Garcia is an investigational medicine used to treat mild-to-moderate COVID-19 in adults and children (15years of age and older weighing at least 80 pounds (40 kg)) with positive results of direct SARS-CoV-2 viral testing, and who are at high risk for progression to severe COVID-19, including hospitalization or death  PAXLOVID is investigational because it is still being studied  There is limited information about the safety and effectiveness of using PAXLOVID to treat people with mild-to-moderate COVID-19      The FDA has authorized the emergency use of PAXLOVID for the treatment of mild-tomoderate COVID-19 in adults and children (12 years of age and older weighing at least 80 pounds (40 kg)) with a positive test for the virus that causes COVID-19, and who are at high risk for progression to severe COVID-19, including hospitalization or death, under an EUA  What should I tell my healthcare provider before I take PAXLOVID? Tell your healthcare provider if you:  - Have any allergies  - Have liver or kidney disease  - Are pregnant or plan to become pregnant  - Are breastfeeding a child  - Have any serious illnesses    Tell your healthcare provider about all the medicines you take, including prescription and over-the-counter medicines, vitamins, and herbal supplements  Some medicines may interact with PAXLOVID and may cause serious side effects  Keep a list of your medicines to show your healthcare provider and pharmacist when you get a new medicine  You can ask your healthcare provider or pharmacist for a list of medicines that interact with PAXLOVID  Do not start taking a new medicine without telling your healthcare provider  Your healthcare provider can tell you if it is safe to take PAXLOVID with other medicines  Tell your healthcare provider if you are taking combined hormonal contraceptive  PAXLOVID may affect how your birth control pills work  Females who are able to become pregnant should use another effective alternative form of contraception or an additional barrier method of contraception  Talk to your healthcare provider if you have any questions about contraceptive methods that might be right for you  How do I take PAXLOVID? PAXLOVID consists of 2 medicines: nirmatrelvir and ritonavir  - Take 2 pink tablets of nirmatrelvir with 1 white tablet of ritonavir by mouth 2 times each day (in the morning and in the evening) for 5 days  For each dose, take all 3 tablets at the same time  - If you have kidney disease, talk to your healthcare provider  You may need a different dose  - Swallow the tablets whole   Do not chew, break, or crush the tablets  - Take PAXLOVID with or without food  - Do not stop taking PAXLOVID without talking to your healthcare provider, even if you feel better  - If you miss a dose of PAXLOVID within 8 hours of the time it is usually taken, take it as soon as you remember  If you miss a dose by more than 8 hours, skip the missed dose and take the next dose at your regular time  Do not take 2 doses of PAXLOVID at the same time  - If you take too much PAXLOVID, call your healthcare provider or go to the nearest hospital emergency room right away  - If you are taking a ritonavir- or cobicistat-containing medicine to treat hepatitis C or Human Immunodeficiency Virus (HIV), you should continue to take your medicine as prescribed by your healthcare provider   - Talk to your healthcare provider if you do not feel better or if you feel worse after 5 days  Who should generally not take PAXLOVID? Do not take PAXLOVID if:  You are allergic to nirmatrelvir, ritonavir, or any of the ingredients in PAXLOVID  You are taking any of the following medicines:  - Alfuzosin  - Pethidine, piroxicam, propoxyphene  - Ranolazine  - Amiodarone, dronedarone, flecainide, propafenone, quinidine  - Colchicine  - Lurasidone, pimozide, clozapine  - Dihydroergotamine, ergotamine, methylergonovine  - Lovastatin, simvastatin  - Sildenafil (Revatio®) for pulmonary arterial hypertension (PAH)  - Triazolam, oral midazolam  - Apalutamide  - Carbamazepine, phenobarbital, phenytoin  - Rifampin  - St  Andre’s Wort (hypericum perforatum)    What are the important possible side effects of PAXLOVID? Possible side effects of PAXLOVID are:  - Liver Problems  Tell your healthcare provider right away if you have any of these signs and symptoms of liver problems: loss of appetite, yellowing of your skin and the whites of eyes (jaundice), dark-colored urine, pale colored stools and itchy skin, stomach area (abdominal) pain    - Resistance to HIV Medicines  If you have untreated HIV infection, PAXLOVID may lead to some HIV medicines not working as well in the future  - Other possible side effects include: altered sense of taste, diarrhea, high blood pressure, or muscle aches    These are not all the possible side effects of PAXLOVID  Not many people have taken PAXLOVID  Serious and unexpected side effects may happen  Varsha Enriquez is still being studied, so it is possible that all of the risks are not known at this time  What other treatment choices are there? Like Chayito Villalobos may allow for the emergency use of other medicines to treat people with COVID-19  Go to https://[employer]/ for information on the emergency use of other medicines that are authorized by FDA to treat people with COVID-19  Your healthcare provider may talk with you about clinical trials for which you may be eligible  It is your choice to be treated or not to be treated with PAXLOVID  Should you decide not to receive it or for your child not to receive it, it will not change your standard medical care  What if I am pregnant or breastfeeding? There is no experience treating pregnant women or breastfeeding mothers with PAXLOVID  For a mother and unborn baby, the benefit of taking PAXLOVID may be greater than the risk from the treatment  If you are pregnant, discuss your options and specific situation with your healthcare provider  It is recommended that you use effective barrier contraception or do not have sexual activity while taking PAXLOVID  If you are breastfeeding, discuss your options and specific situation with your healthcare provider  How do I report side effects with PAXLOVID? Contact your healthcare provider if you have any side effects that bother you or do not go away      Report side effects to FDA MedWatch at www fda gov/medwatch or call 3-222-PAW7948 or you can report side effects to [facility]  at the contact information provided below  Website Fax number Telephone number   [place] [phone]     How should I store [address]? Store PAXLOVID tablets at room temperature between 68°F to 77°F (20°C to 25°C)  Full fact sheet for patients, parents, and caregivers can be found at: [facility]ocelynAplicorSalina dickson    I have spent 10 minutes directly with the patient  Greater than 50% of this time was spent in counseling/coordination of care regarding: diagnostic results, prognosis, risks and benefits of treatment options, instructions for management, patient and family education, importance of treatment compliance, risk factor reductions and impressions  Encounter provider: Nicole Mcknight PA-C     Provider located at: [address] [place] [address] [address]  [address] [facility]  [facility] 2-3  [address] [facility] [id number] [phone]     Recent Visits  No visits were found meeting these conditions  Showing recent visits within past 7 days and meeting all other requirements  Today's Visits  Date Type Provider Dept   11/08/22 Telemedicine Nicole Mcknight PA-C [facility] [address] today's visits and meeting all other requirements  Future Appointments  No visits were found meeting these conditions  Showing future appointments within next 150 days and meeting all other requirements     This virtual check-in was done via [address] and patient was informed that this is a secure, HIPAA-compliant platform  She agrees to proceed  Patient agrees to participate in a virtual check in via telephone or video visit instead of presenting to the office to address urgent/immediate medical needs  Patient is aware this is a billable service  She acknowledged consent and understanding of privacy and security of the video platform   The patient has agreed to participate and understands they can discontinue the visit at any time  After connecting through [facility], the patient was identified by name and date of birth  Moo Vanessa was informed that this was a telemedicine visit and that the exam was being conducted confidentially over secure lines  My office door was closed  No one else was in the room  Moo Vanessa acknowledged consent and understanding of privacy and security of the telemedicine visit  I informed the patient that I have reviewed her record in Epic and presented the opportunity for her to ask any questions regarding the visit today  The patient agreed to participate  Verification of patient location:  Patient is located in the following state in which I hold an active license: [place]    Subjective:   Moo Vanessa is a 73 y o  female who is concerned about COVID-19  Patient's symptoms include fever, chills, fatigue, nasal congestion, rhinorrhea, cough, nausea, diarrhea, myalgias and headache   Patient denies malaise, sore throat, anosmia, loss of taste, shortness of breath, chest tightness, abdominal pain and vomiting      - Date of symptom onset: 11/4/2022      COVID-19 vaccination status: Fully vaccinated with booster    Exposure:   Contact with a person who is under investigation (PUI) for or who is positive for COVID-19 within the last 14 days?: No    Hospitalized recently for fever and/or lower respiratory symptoms?: No      Currently a healthcare worker that is involved in direct patient care?: No      Works in a special setting where the risk of COVID-19 transmission may be high? (this may include long-term care, correctional and [facility] facilities; homeless shelters; assisted-living facilities and group homes ): No      Resident in a special setting where the risk of COVID-19 transmission may be high? (this may include long-term care, correctional and [facility] facilities; homeless shelters; assisted-living facilities and group homes ): No      No results found for: Randell Epps, [address] [facility], [place], [facility] [place], [address] [facility]    Review of Systems   Constitutional: Positive for chills, fatigue and fever  HENT: Positive for congestion and rhinorrhea  Negative for sore throat  Respiratory: Positive for cough  Negative for chest tightness and shortness of breath  Gastrointestinal: Positive for diarrhea and nausea  Negative for abdominal pain and vomiting  Musculoskeletal: Positive for myalgias  Neurological: Positive for headaches  Current Outpatient Medications on File Prior to Visit   Medication Sig   • Accu-Chek Marsha Plus test strip TEST BLOOD SUGAR ONE TIME DAILY AS DIRECTED   • Accu-Chek Softclix Lancets lancets TEST BLOOD SUGAR ONE TIME DAILY AS DIRECTED   • Blood Glucose Monitoring Suppl (Accu-Chek Marsha Plus) w/Device KIT USE AS DIRECTED   • busPIRone (BUSPAR) 10 mg tablet Take 1 tablet (10 mg total) by mouth 2 (two) times a day as needed (anxiety)   • cholecalciferol (VITAMIN D3) 1,000 units tablet Take 1,000 Units by mouth daily   • Elastic Bandages & Supports (Wrist Brace/Left Medium) MISC Use in the morning   • fluticasone (FLONASE) 50 mcg/act nasal spray    • ibandronate (BONIVA) 150 MG tablet Take 1 tablet (150 mg total) by mouth every 30 (thirty) days   • levocetirizine (XYZAL) 5 MG tablet    • levothyroxine 50 mcg tablet TAKE 1 TABLET EVERY DAY   • metFORMIN (GLUCOPHAGE) 1000 MG tablet TAKE 1 TABLET TWICE DAILY WITH MEALS   • methocarbamol (ROBAXIN) 500 mg tablet Take 500 mg by mouth if needed for muscle spasms   • rosuvastatin (CRESTOR) 10 MG tablet TAKE 1 TABLET AT BEDTIME   • TURMERIC PO Take by mouth       Objective:    Ht 5' (1 524 m)   Wt 61 2 kg (135 lb)   BMI 26 37 kg/m²      Physical Exam  Constitutional:       General: She is not in acute distress  Appearance: She is well-developed  She is not ill-appearing  Comments: Minimal coughing during the interview  No evidence of conversational dyspnea  HENT:      Head: Normocephalic  Pulmonary:      Effort: Pulmonary effort is normal  No respiratory distress  Neurological:      Mental Status: She is alert and oriented to person, place, and time     Psychiatric:         Behavior: Behavior normal        Lee Marte PA-C

## 2022-11-11 ENCOUNTER — TELEPHONE (OUTPATIENT)
Dept: INTERNAL MEDICINE CLINIC | Facility: CLINIC | Age: 72
End: 2022-11-11

## 2022-11-11 NOTE — TELEPHONE ENCOUNTER
FYI  Patient wanted to let you know she is feeling so much better  The only thing is she has an awful metal taste in her mouth  Overall, she is doing better!

## 2022-11-22 DIAGNOSIS — E78.2 MIXED HYPERLIPIDEMIA: ICD-10-CM

## 2022-11-22 RX ORDER — ROSUVASTATIN CALCIUM 10 MG/1
TABLET, COATED ORAL
Qty: 90 TABLET | Refills: 1 | Status: SHIPPED | OUTPATIENT
Start: 2022-11-22

## 2022-12-14 ENCOUNTER — OFFICE VISIT (OUTPATIENT)
Dept: DERMATOLOGY | Facility: CLINIC | Age: 72
End: 2022-12-14

## 2022-12-14 VITALS — BODY MASS INDEX: 26.5 KG/M2 | WEIGHT: 135 LBS | HEIGHT: 60 IN

## 2022-12-14 DIAGNOSIS — S40.811A: Primary | ICD-10-CM

## 2022-12-14 NOTE — PATIENT INSTRUCTIONS
Sudden occurrence of this process does not portend that this is a malignant feeling this is probably just something that she accidentally scratched her got irritated keep it only on this if it does not resolve in a month she should let us know

## 2022-12-14 NOTE — PROGRESS NOTES
[address] [facility] DERMATOLOGY  [address] [place] [id number]  [phone]  [phone]     MRN: [id number] : 1950  Encounter: [id number]  Patient Information: Jimmy Jay    Subjective:     26-year-old female with previous history of skin cancer presents secondary to a sudden change on the scar on her right upper arm  Patient does not recall scratching at the area and but she just all of a sudden noticed this on      Objective:   Ht 5' (1 524 m)   Wt 61 2 kg (135 lb)   BMI 26 37 kg/m²     Physical Exam:    General Appearance:    Alert, cooperative, no distress   Skin:    4 mm erosion noted adjacent to the scar on the right upper shoulder     Assessment:     1  Excoriation of right upper arm, initial encounter              Plan:   Sudden occurrence of this process does not portend that this is a malignant feeling this is probably just something that she accidentally scratched her got irritated keep it only on this if it does not resolve in a month she should let us know      Prior to Admission medications    Medication Sig Start Date End Date Taking?  Authorizing Provider   Accu-Chek Marsha Plus test strip TEST BLOOD SUGAR ONE TIME DAILY AS DIRECTED 22  Yes Debbie Ozuna MD   Accu-Chek Softclix Lancets lancets TEST BLOOD SUGAR ONE TIME DAILY AS DIRECTED 22  Yes Debbie Ozuna MD   Blood Glucose Monitoring Suppl (Accu-Chek Marsha Plus) w/Device KIT USE AS DIRECTED 22  Yes Debbie Ozuna MD   busPIRone (BUSPAR) 10 mg tablet Take 1 tablet (10 mg total) by mouth 2 (two) times a day as needed (anxiety) 22  Yes Debbie Ozuan MD   cholecalciferol (VITAMIN D3) 1,000 units tablet Take 1,000 Units by mouth daily   Yes Historical Provider, MD   Elastic Bandages & Supports (Wrist Brace/Left Medium) MISC Use in the morning 22  Yes Debbie Ozuna MD   fluticasone Mcgee Badder) 50 mcg/act nasal spray  10/8/19  Yes Historical Provider, MD   ibandronate (BONIVA) 150 MG tablet Take 1 tablet (150 mg total) by mouth every 30 (thirty) days 12/8/20  Yes Raad Nation MD   levocetirizine (XYZAL) 5 MG tablet  2/9/22  Yes Historical Provider, MD   levothyroxine 50 mcg tablet TAKE 1 TABLET EVERY DAY 9/19/22  Yes Raad Nation MD   metFORMIN (GLUCOPHAGE) 1000 MG tablet TAKE 1 TABLET TWICE DAILY WITH MEALS 4/26/22  Yes Jordin hDillon MD   methocarbamol (ROBAXIN) 500 mg tablet Take 500 mg by mouth if needed for muscle spasms   Yes Historical Provider, MD   rosuvastatin (CRESTOR) 10 MG tablet TAKE 1 TABLET AT BEDTIME 11/22/22  Yes Raad Nation MD   TURMERIC PO Take by mouth   Yes Historical Provider, MD     Allergies   Allergen Reactions   • Bee Venom Shortness Of Breath and Swelling   • Latex Rash       Sudeep Chen MD  12/14/2022,4:20 PM    Portions of the record may have been created with voice recognition software   Occasional wrong word or "sound a like" substitutions may have occurred due to the inherent limitations of voice recognition software   Read the chart carefully and recognize, using context, where substitutions have occurred

## 2023-01-16 DIAGNOSIS — Z12.39 SCREENING FOR BREAST CANCER: ICD-10-CM

## 2023-01-16 NOTE — PROGRESS NOTES
Assessment/Plan:       Chronic problems appear stable  Needs to do better with her diet for the sugar  Continue current medications  For her ongoing back issues, she has failed conservative measures at the chiropractor, will refer to Orthopedics  Since she reported trouble with her insurance before, I gave her several options to check  Gave her a new prescription for her replacement wrist brace for carpal tunnel  Ordered labs for next visit  Quality Measures:       Return in about 4 months (around 10/14/2022)  No problem-specific Assessment & Plan notes found for this encounter  Diagnoses and all orders for this visit:    Type 2 diabetes mellitus without complication, without long-term current use of insulin (HCC)  -     Comprehensive metabolic panel; Future  -     CBC and differential; Future  -     Hemoglobin A1C; Future  -     Lipid panel; Future    Hypothyroidism, acquired  -     T4, free; Future  -     TSH, 3rd generation; Future    Mixed hyperlipidemia    Anxiety  -     busPIRone (BUSPAR) 10 mg tablet; Take 1 tablet (10 mg total) by mouth 2 (two) times a day as needed (anxiety)    Chronic low back pain with bilateral sciatica, unspecified back pain laterality  -     Ambulatory referral to Orthopedic Surgery; Future    Carpal tunnel syndrome of left wrist  -     Elastic Bandages & Supports (Wrist Brace/Left Medium) MISC; Use in the morning    Other orders  -     ciprofloxacin (CIPRO) 500 mg tablet; Take 500 mg by mouth 2 (two) times a day  -     methocarbamol (ROBAXIN) 500 mg tablet; Take 500 mg by mouth if needed for muscle spasms        Subjective:      Patient ID: Anastasiya Andujar is a 73 y o  female  Patient comes in today for routine follow-up  Her sugars have gone up a little bit more  She admits she could do better with her diet  Thyroid and cholesterol are controlled  Taking her medicines as directed  She reports ongoing issues with her lower back    This stems from an injury over 7 years ago  She has been seeing a chiropractor which had been maintaining her but she is not doing as well lately  She would like to see an orthopedic doctor  She also has an ongoing issue with carpal tunnel symptoms in her left hand  They are controlled with the brace that she has had for several years  It is falling apart  She is asking for a new prescription for a new 1        ALLERGIES:  Allergies   Allergen Reactions    Bee Venom Shortness Of Breath and Swelling    Latex Rash       CURRENT MEDICATIONS:    Current Outpatient Medications:     Accu-Chek Marsha Plus test strip, TEST BLOOD SUGAR ONE TIME DAILY AS DIRECTED, Disp: 100 strip, Rfl: 1    Accu-Chek Softclix Lancets lancets, TEST BLOOD SUGAR ONE TIME DAILY AS DIRECTED, Disp: 100 each, Rfl: 1    Blood Glucose Monitoring Suppl (Accu-Chek Marsha Plus) w/Device KIT, USE AS DIRECTED, Disp: 1 kit, Rfl: 1    busPIRone (BUSPAR) 10 mg tablet, Take 1 tablet (10 mg total) by mouth 2 (two) times a day as needed (anxiety), Disp: 180 tablet, Rfl: 3    cholecalciferol (VITAMIN D3) 1,000 units tablet, Take 1,000 Units by mouth daily, Disp: , Rfl:     ciprofloxacin (CIPRO) 500 mg tablet, Take 500 mg by mouth 2 (two) times a day, Disp: , Rfl:     Elastic Bandages & Supports (Wrist Brace/Left Medium) MISC, Use in the morning, Disp: 1 each, Rfl: 3    fluticasone (FLONASE) 50 mcg/act nasal spray, , Disp: , Rfl:     ibandronate (BONIVA) 150 MG tablet, Take 1 tablet (150 mg total) by mouth every 30 (thirty) days, Disp: 3 tablet, Rfl: 3    levocetirizine (XYZAL) 5 MG tablet, , Disp: , Rfl:     levothyroxine 50 mcg tablet, TAKE 1 TABLET EVERY DAY, Disp: 90 tablet, Rfl: 2    metFORMIN (GLUCOPHAGE) 1000 MG tablet, TAKE 1 TABLET TWICE DAILY WITH MEALS, Disp: 180 tablet, Rfl: 1    methocarbamol (ROBAXIN) 500 mg tablet, Take 500 mg by mouth if needed for muscle spasms, Disp: , Rfl:     rosuvastatin (CRESTOR) 10 MG tablet, TAKE 1 TABLET AT BEDTIME, Disp: 90 tablet, Rfl: 1    TURMERIC PO, Take by mouth, Disp: , Rfl:     ACTIVE PROBLEM LIST:  Patient Active Problem List   Diagnosis    Basal cell carcinoma of back    [facility]    Hypothyroidism, acquired    Mixed hyperlipidemia    Age related osteoporosis    Type 2 diabetes mellitus without complication, without long-term current use of insulin ([facility] )       PAST MEDICAL HISTORY:  History reviewed  No pertinent past medical history  PAST SURGICAL HISTORY:  Past Surgical History:   Procedure Laterality Date    MYRINGOTOMY W/ TUBES         FAMILY HISTORY:  Family History   Problem Relation Age of Onset    Thyroid cancer Mother     Prostate cancer Father        SOCIAL HISTORY:  Social History     Socioeconomic History    Marital status: /Civil Union     Spouse name: Not on file    Number of children: Not on file    Years of education: Not on file    Highest education level: Not on file   Occupational History    Not on file   Tobacco Use    Smoking status: Never Smoker    Smokeless tobacco: Never Used   Vaping Use    Vaping Use: Never used   Substance and Sexual Activity    Alcohol use: Not Currently    Drug use: Never    Sexual activity: Yes   Other Topics Concern    Not on file   Social History Narrative    Not on file     Social Determinants of Health     Financial Resource Strain: Not on file   Food Insecurity: Not on file   Transportation Needs: Not on file   Physical Activity: Not on file   Stress: Not on file   Social Connections: Not on file   Intimate Partner Violence: Not on file   Housing Stability: Not on file       Review of Systems   Respiratory: Negative for shortness of breath  Cardiovascular: Negative for chest pain  Gastrointestinal: Negative for abdominal pain           Objective:  Vitals:    06/14/22 0832   BP: 122/76   BP Location: Left arm   Patient Position: Sitting   Pulse: 85   Resp: 14   SpO2: 99%   Weight: 63 kg (139 lb)   Height: 5' (1 524 m) Body mass index is 27 15 kg/m²  Physical Exam  Vitals and nursing note reviewed  Constitutional:       Appearance: She is well-developed  Cardiovascular:      Rate and Rhythm: Normal rate and regular rhythm  Heart sounds: Normal heart sounds  Pulmonary:      Effort: Pulmonary effort is normal       Breath sounds: Normal breath sounds  Abdominal:      Palpations: Abdomen is soft  Tenderness: There is no abdominal tenderness  Neurological:      Mental Status: She is alert and oriented to person, place, and time     Psychiatric:         Mood and Affect: Mood normal          Behavior: Behavior normal            RESULTS:    Recent Results (from the past 1008 hour(s))   Cologuard    Collection Time: 05/18/22  9:30 AM   Result Value Ref Range    Cologuard Result Negative Negative   Comprehensive metabolic panel    Collection Time: 05/31/22  9:04 AM   Result Value Ref Range    Sodium 135 (L) 136 - 145 mmol/L    Potassium 3 9 3 5 - 5 3 mmol/L    Chloride 105 100 - 108 mmol/L    CO2 27 21 - 32 mmol/L    ANION GAP 3 (L) 4 - 13 mmol/L    BUN 11 5 - 25 mg/dL    Creatinine 0 83 0 60 - 1 30 mg/dL    Glucose, Fasting 185 (H) 65 - 99 mg/dL    Calcium 10 0 8 3 - 10 1 mg/dL    AST 30 5 - 45 U/L    ALT 53 12 - 78 U/L    Alkaline Phosphatase 84 46 - 116 U/L    Total Protein 8 1 6 4 - 8 2 g/dL    Albumin 4 2 3 5 - 5 0 g/dL    Total Bilirubin 0 34 0 20 - 1 00 mg/dL    eGFR 72 ml/min/1 73sq m   CBC and differential    Collection Time: 05/31/22  9:04 AM   Result Value Ref Range    WBC 8 10 4 31 - 10 16 Thousand/uL    RBC 4 39 3 81 - 5 12 Million/uL    Hemoglobin 12 4 11 5 - 15 4 g/dL    Hematocrit 38 7 34 8 - 46 1 %    MCV 88 82 - 98 fL    MCH 28 2 26 8 - 34 3 pg    MCHC 32 0 31 4 - 37 4 g/dL    RDW 14 0 11 6 - 15 1 %    MPV 12 0 8 9 - 12 7 fL    Platelets 433 749 - 044 Thousands/uL    nRBC 0 /100 WBCs    Neutrophils Relative 62 43 - 75 %    Immat GRANS % 0 0 - 2 %    Lymphocytes Relative 28 14 - 44 %    Monocytes Relative 7 4 - 12 %    Eosinophils Relative 2 0 - 6 %    Basophils Relative 1 0 - 1 %    Neutrophils Absolute 5 00 1 85 - 7 62 Thousands/µL    Immature Grans Absolute 0 03 0 00 - 0 20 Thousand/uL    Lymphocytes Absolute 2 27 0 60 - 4 47 Thousands/µL    Monocytes Absolute 0 57 0 17 - 1 22 Thousand/µL    Eosinophils Absolute 0 17 0 00 - 0 61 Thousand/µL    Basophils Absolute 0 06 0 00 - 0 10 Thousands/µL   Hemoglobin A1C    Collection Time: 05/31/22  9:04 AM   Result Value Ref Range    Hemoglobin A1C 7 6 (H) Normal 3 8-5 6%; PreDiabetic 5 7-6 4%; Diabetic >=6 5%; Glycemic control for adults with diabetes <7 0% %     mg/dl   Lipid panel    Collection Time: 05/31/22  9:04 AM   Result Value Ref Range    Cholesterol 145 See Comment mg/dL    Triglycerides 196 (H) See Comment mg/dL    HDL, Direct 38 (L) >=50 mg/dL    LDL Calculated 68 0 - 100 mg/dL    Non-HDL-Chol (CHOL-HDL) 107 mg/dl   Microalbumin / creatinine urine ratio    Collection Time: 05/31/22  9:04 AM   Result Value Ref Range    Creatinine, Ur 104 0 mg/dL    Microalbum  ,U,Random 107 0 (H) 0 0 - 20 0 mg/L    Microalb Creat Ratio 103 (H) 0 - 30 mg/g creatinine   T4, free    Collection Time: 05/31/22  9:04 AM   Result Value Ref Range    Free T4 1 01 0 76 - 1 46 ng/dL   TSH, 3rd generation    Collection Time: 05/31/22  9:04 AM   Result Value Ref Range    TSH 3RD GENERATON 2 150 0 450 - 4 500 uIU/mL       This note was created with voice recognition software  Phonic, grammatical and spelling errors may be present within the note as a result  Lip Wedge Excision Repair Text: Given the location of the defect and the proximity to free margins a full thickness wedge repair was deemed most appropriate.  Using a sterile surgical marker, the appropriate repair was drawn incorporating the defect and placing the expected incisions perpendicular to the vermilion border.  The vermilion border was also meticulously outlined to ensure appropriate reapproximation during the repair.  The area thus outlined was incised through and through with a #15 scalpel blade.  The muscularis and dermis were reaproximated with deep sutures following hemostasis. Care was taken to realign the vermilion border before proceeding with the superficial closure.  Once the vermilion was realigned the superfical and mucosal closure was finished.

## 2023-01-17 ENCOUNTER — TELEPHONE (OUTPATIENT)
Dept: ADMINISTRATIVE | Facility: OTHER | Age: 73
End: 2023-01-17

## 2023-01-17 NOTE — TELEPHONE ENCOUNTER
----- Message from Sumanth Millan, [address] [place] sent at 1/13/2023  8:17 AM EST -----  Regarding: mammo  01/13/23 8:18 AM    Hello, our patient attached above has had Mammogram completed/performed  Please assist in updating the patient chart by pulling the Care Everywhere (CE) document   The date of service is 1/12/23    Thank you,  Sumanth [facility]

## 2023-01-17 NOTE — TELEPHONE ENCOUNTER
Upon review of the In Basket request we were able to locate, review, and update the patient chart as requested for Mammogram     Any additional questions or concerns should be emailed to the Practice Liaisons via the appropriate education email address, please do not reply via In Basket      Thank you  Uziel Kapoor MA

## 2023-01-26 ENCOUNTER — OFFICE VISIT (OUTPATIENT)
Dept: DERMATOLOGY | Facility: CLINIC | Age: 73
End: 2023-01-26

## 2023-01-26 VITALS — BODY MASS INDEX: 26.31 KG/M2 | HEIGHT: 60 IN | WEIGHT: 134 LBS

## 2023-01-26 DIAGNOSIS — Z13.89 SCREENING FOR SKIN CONDITION: ICD-10-CM

## 2023-01-26 DIAGNOSIS — L57.0 ACTINIC KERATOSIS: Primary | ICD-10-CM

## 2023-01-26 DIAGNOSIS — G57.12 MERALGIA PARESTHETICA OF LEFT SIDE: ICD-10-CM

## 2023-01-26 DIAGNOSIS — Z85.828 HISTORY OF SKIN CANCER: ICD-10-CM

## 2023-01-26 DIAGNOSIS — L82.1 SEBORRHEIC KERATOSIS: ICD-10-CM

## 2023-01-26 NOTE — PROGRESS NOTES
[place] [address] [facility] Note     Patient Name: Risa Sorensen  Encounter Date: January 26, 2023     Have you been cared for by a [place] [address] Dermatologist in the last 3 years and, if so, which description applies to you? Yes  I have been here within the last 3 years, and my medical history has NOT changed since that time  I am FEMALE/of child-bearing potential     REVIEW OF SYSTEMS:  Have you recently had or currently have any of the following? · No changes in my recent health  PAST MEDICAL HISTORY:  Have you personally ever had or currently have any of the following? If "YES," then please provide more detail  · No changes in my medical history  FAMILY HISTORY:  Any "first degree relatives" (parent, brother, sister, or child) with the following? • No changes in my family's known health  PATIENT EXPERIENCE:    • Do you want the Dermatologist to perform a COMPLETE skin exam today including a clinical examination under the "bra and underwear" areas? Yes  • If necessary, do we have your permission to call and leave a detailed message on your Preferred Phone number that includes your specific medical information?   Yes      Allergies   Allergen Reactions   • Bee Venom Shortness Of Breath and Swelling   • Latex Rash      Current Outpatient Medications:   •  Accu-Chek Marsha Plus test strip, TEST BLOOD SUGAR ONE TIME DAILY AS DIRECTED, Disp: 100 strip, Rfl: 1  •  Accu-Chek Softclix Lancets lancets, TEST BLOOD SUGAR ONE TIME DAILY AS DIRECTED, Disp: 100 each, Rfl: 1  •  Blood Glucose Monitoring Suppl (Accu-Chek Marsha Plus) w/Device KIT, USE AS DIRECTED, Disp: 1 kit, Rfl: 1  •  busPIRone (BUSPAR) 10 mg tablet, Take 1 tablet (10 mg total) by mouth 2 (two) times a day as needed (anxiety), Disp: 180 tablet, Rfl: 3  •  cholecalciferol (VITAMIN D3) 1,000 units tablet, Take 1,000 Units by mouth daily, Disp: , Rfl:   •  Elastic Bandages & Supports (Wrist Brace/Left Medium) MISC, Use in the morning, Disp: 1 each, Rfl: 3  •  fluticasone (FLONASE) 50 mcg/act nasal spray, , Disp: , Rfl:   •  ibandronate (BONIVA) 150 MG tablet, Take 1 tablet (150 mg total) by mouth every 30 (thirty) days, Disp: 3 tablet, Rfl: 3  •  levocetirizine (XYZAL) 5 MG tablet, , Disp: , Rfl:   •  levothyroxine 50 mcg tablet, TAKE 1 TABLET EVERY DAY, Disp: 90 tablet, Rfl: 3  •  metFORMIN (GLUCOPHAGE) 1000 MG tablet, TAKE 1 TABLET TWICE DAILY WITH MEALS, Disp: 180 tablet, Rfl: 1  •  methocarbamol (ROBAXIN) 500 mg tablet, Take 500 mg by mouth if needed for muscle spasms, Disp: , Rfl:   •  rosuvastatin (CRESTOR) 10 MG tablet, TAKE 1 TABLET AT BEDTIME, Disp: 90 tablet, Rfl: 1  •  TURMERIC PO, Take by mouth, Disp: , Rfl:           • Whom besides the patient is providing clinical information about today's encounter?   o NO ADDITIONAL HISTORIAN (patient alone provided history)    Physical Exam and Assessment/Plan by Diagnosis:

## 2023-01-26 NOTE — PROGRESS NOTES
[address]  [facility]  [place] [address]  [phone]  [phone]     MRN: [id number] : 1950  Encounter: [id number]  Patient Information: Nava Shirts  Chief complaint: 6 month check up    History of present illness: 33-year-old female presents for overall skin check previous history of nonmelanoma skin cancer concern regarding a spot on her upper forehead also a lesion on the right antecubital area patient also complaining of itching in the upper back  No past medical history on file  Past Surgical History:   Procedure Laterality Date   • MYRINGOTOMY W/ TUBES       Social History   Social History     Substance and Sexual Activity   Alcohol Use Not Currently     Social History     Substance and Sexual Activity   Drug Use Never     Social History     Tobacco Use   Smoking Status Never   Smokeless Tobacco Never     Family History   Problem Relation Age of Onset   • Thyroid cancer Mother    • Prostate cancer Father      Meds/Allergies   Allergies   Allergen Reactions   • Bee Venom Shortness Of Breath and Swelling   • Latex Rash       Meds:  Prior to Admission medications    Medication Sig Start Date End Date Taking?  Authorizing Provider   Accu-Chek Marsha Plus test strip TEST BLOOD SUGAR ONE TIME DAILY AS DIRECTED 22  Yes Ancelmo Rice MD   Accu-Chek Softclix Lancets lancets TEST BLOOD SUGAR ONE TIME DAILY AS DIRECTED 22  Yes Ancelmo Rice MD   Blood Glucose Monitoring Suppl (Accu-Chek Marsha Plus) w/Device KIT USE AS DIRECTED 22  Yes Ancelmo Rice MD   busPIRone (BUSPAR) 10 mg tablet Take 1 tablet (10 mg total) by mouth 2 (two) times a day as needed (anxiety) 22  Yes Ancelmo Rice MD   cholecalciferol (VITAMIN D3) 1,000 units tablet Take 1,000 Units by mouth daily   Yes Historical Provider, MD   Elastic Bandages & Supports (Wrist Brace/Left Medium) MISC Use in the morning 22  Yes Ancelmo Rice MD   fluticasone (FLONASE) 50 mcg/act nasal spray  10/8/19  Yes Historical Provider, MD   ibandronate (BONIVA) 150 MG tablet Take 1 tablet (150 mg total) by mouth every 30 (thirty) days 12/8/20  Yes Enmanuel Hines MD   levocetirizine Richardjeramy Forest) 5 MG tablet  2/9/22  Yes Historical Provider, MD   levothyroxine 50 mcg tablet TAKE 1 TABLET EVERY DAY 9/19/22  Yes Enmanuel Hines MD   metFORMIN (GLUCOPHAGE) 1000 MG tablet TAKE 1 TABLET TWICE DAILY WITH MEALS 4/26/22  Yes Jayde Verdin MD   methocarbamol (ROBAXIN) 500 mg tablet Take 500 mg by mouth if needed for muscle spasms   Yes Historical Provider, MD   rosuvastatin (CRESTOR) 10 MG tablet TAKE 1 TABLET AT BEDTIME 11/22/22  Yes Enmanuel Hines MD   TURMERIC PO Take by mouth   Yes Historical Provider, MD       Subjective:     Review of Systems:    General: negative for - chills, fatigue, fever,  weight gain or weight loss  Psychological: negative for - anxiety, behavioral disorder, concentration difficulties, decreased libido, depression, irritability, memory difficulties, mood swings, sleep disturbances or suicidal ideation  ENT: negative for - hearing difficulties , nasal congestion, nasal discharge, oral lesions, sinus pain, sneezing, sore throat  Allergy and Immunology: negative for - hives, insect bite sensitivity,  Hematological and Lymphatic: negative for - bleeding problems, blood clots,bruising, swollen lymph nodes  Endocrine: negative for - hair pattern changes, hot flashes, malaise/lethargy, mood swings, palpitations, polydipsia/polyuria, skin changes, temperature intolerance or unexpected weight change  Respiratory: negative for - cough, hemoptysis, orthopnea, shortness of breath, or wheezing  Cardiovascular: negative for - chest pain, dyspnea on exertion, edema,  Gastrointestinal: negative for - abdominal pain, nausea/vomiting  Genito-Urinary: negative for - dysuria, incontinence, irregular/heavy menses or urinary frequency/urgency  Musculoskeletal: negative for - gait disturbance, joint pain, joint stiffness, joint swelling, muscle pain, muscular weakness  Dermatological:  As in HPI  Neurological: negative for confusion, dizziness, headaches, impaired coordination/balance, memory loss, numbness/tingling, seizures, speech problems, tremors or weakness       Objective:   Ht 5' (1 524 m)   Wt 60 8 kg (134 lb)   LMP  (LMP Unknown)   BMI 26 17 kg/m²     Physical Exam:    General Appearance:    Alert, cooperative, no distress   Head:    Normocephalic, without obvious abnormality, atraumatic           Skin:   A full skin exam was performed including scalp, head scalp, eyes, ears, nose, lips, neck, chest, axilla, abdomen, back, buttocks, bilateral upper extremities, bilateral lower extremities, hands, feet, fingers, toes, fingernails, and toenails  Erythematous area noted on the upper forehead and more keratotic papules with greasy stuck on appearance previous sites of skin cancer well-healed without recurrence no distinct rash noted on the upper back  Physical Exam  HENT:      Head:          Cryotherapy Procedure Note    Pre-operative Diagnosis: actinic keratosis    Plan:  1  Instructed to keep the area dry and clean thereafter  Apply petrolatum if area gets crusty  2  Recommended that the patient use acetaminophen  as needed for pain  Locations: Upper forehead    Indications: Destruction of actinic keratosis x1    Patient informed of risks (permanent scarring, infection, light or dark discoloration, bleeding, infection, weakness, numbness and recurrence of the lesion) and benefits of the procedure and verbal informed consent obtained  The areas are treated with liquid nitrogen therapy, frozen until ice ball extended 2 mm beyond lesion, allowed to thaw, and treated again  The patient tolerated procedure well  The patient was instructed on post-op care, warned that there may be blister formation, redness and pain   Recommend OTC analgesia as needed for pain     Condition:  Stable    Complications:  none  Assessment:     1  Actinic keratosis        2  Seborrheic keratosis        3  Screening for skin condition        4  Meralgia paresthetica of left side        5  History of skin cancer              Plan:   Actinic Keratosis:  Patient advised lesions are precancers  These should resolve with cryosurgery if not to let us know sun block recommended  NOTALGIA PARESTHETICA      Assessment and Plan:  Notalgia paresthetica is a condition where itch and changed sensation arise in the areas of skin on the medial aspect of the shoulder blade on either side of the back  Notalgia means pain in the back, and paraesthetica refers to burning pain, tingling or itch  It is also called thoracic cutaneous nerve entrapment syndrome  The nerves which supply sensation to the upper back emerge from the spinal cord (2nd to 6th thoracic segments) and run a long course up through the thick muscles of the back  They make a right-angled turn before reaching the skin  The nerves appear to be vulnerable to compression or traction  Partial compression or injury leads to the symptoms  Initial injury to the nerves may include:  • Back injury  • Herniated or "slipped" disc  • Herpes zoster (shingles)  • Sunburn  • Myelopathy (muscular disease)  • Small fiber neuropathy    Notalgia paresthetica often starts after a period of intense exercise leading to muscular stiffness, or a period of inactivity  Sometimes, a specific injury may be recalled  It is characterized by intense itch on the medial border of one scapula or both, ie, between the shoulder blades  This itch can be intermittent or continuous  It is unrelieved by scratching, although the scratching and rubbing may be pleasurable  The affected area may spread to both shoulder blades and more widely over the back and shoulders  In many patients, there are no visible signs     Visible changes often arise from rubbing and scratching the affected area  These include:  • Scratch marks  • Hyperpigmentation (brown marks)  • Hypopigmentation (white marks)  • Lichen simplex (a type of eczema)  • Scarring    There may be changed sensation in the affected area of skin in response to light touch, sharp objects, cold, and heat  There may be reduced or absent sweating in the affected area  Radiology such as X-ray, CT scan or MRI may demonstrate a degenerative vertebra or prolapsed disc in the area that corresponds to the nerve supply to the affected skin  In many cases, no abnormality is revealed  Treatment is not always necessary, and it is not always successful  Typical management may include the following:  • Cooling lotions or creams as required (camphor and menthol)  • Topical steroids to treat associated lichen simplex  • Capsaicin cream - this depletes nerve endings of their chemical transmitters but requires frequent reapplication and causes unpleasant local side effects  • Local anesthetic creams may provide temporary relief of symptoms  • Amitriptyline or other oral tricyclic medications at night to help sleep and counteract neuropathic symptoms  • Gabapentin, pregabalin or other anticonvulsants can relieve unpleasant burning or tingling sensations  • Transcutaneous electrical nerve stimulation (TENS)  • Surgical decompression of vertebral nerve impingement    Physical therapy with repetitive exercises and stretches for the upper back has been reported to be effective      • While sitting, cross arms and bend forwards to stretch upper back  • Arms at sides, raise shoulders and rotate them forwards and backwards  • Arms straight, rotate forwards 360 degrees and backwards 360 degrees  • Rotate upper body left and right until stretch is felt and hold  • Massage the muscles besides the spine in the affected area    Seborrheic keratosis patient reassured these are normal growths we acquire with age no treatment needed  History of skin cancer in no recurrence nothing else atypical sunblock recommended follow-up in 6 months  Screening for dermatologic disorders nothing else of concern noted on complete exam follow-up in 6 months    Mary Clement MD  1/26/2023,8:57 AM    Portions of the record may have been created with voice recognition software   Occasional wrong word or "sound a like" substitutions may have occurred due to the inherent limitations of voice recognition software   Read the chart carefully and recognize, using context, where substitutions have occurred

## 2023-01-26 NOTE — PATIENT INSTRUCTIONS
Actinic Keratosis:  Patient advised lesions are precancers  These should resolve with cryosurgery if not to let us know sun block recommended  NOTALGIA PARESTHETICA      Assessment and Plan:  Notalgia paresthetica is a condition where itch and changed sensation arise in the areas of skin on the medial aspect of the shoulder blade on either side of the back  Notalgia means pain in the back, and paraesthetica refers to burning pain, tingling or itch  It is also called thoracic cutaneous nerve entrapment syndrome  The nerves which supply sensation to the upper back emerge from the spinal cord (2nd to 6th thoracic segments) and run a long course up through the thick muscles of the back  They make a right-angled turn before reaching the skin  The nerves appear to be vulnerable to compression or traction  Partial compression or injury leads to the symptoms  Initial injury to the nerves may include:  Back injury  Herniated or "slipped" disc  Herpes zoster (shingles)  Sunburn  Myelopathy (muscular disease)  Small fiber neuropathy    Notalgia paresthetica often starts after a period of intense exercise leading to muscular stiffness, or a period of inactivity  Sometimes, a specific injury may be recalled  It is characterized by intense itch on the medial border of one scapula or both, ie, between the shoulder blades  This itch can be intermittent or continuous  It is unrelieved by scratching, although the scratching and rubbing may be pleasurable  The affected area may spread to both shoulder blades and more widely over the back and shoulders  In many patients, there are no visible signs  Visible changes often arise from rubbing and scratching the affected area   These include:  Scratch marks  Hyperpigmentation (brown marks)  Hypopigmentation (white marks)  Lichen simplex (a type of eczema)  Scarring    There may be changed sensation in the affected area of skin in response to light touch, sharp objects, cold, and heat  There may be reduced or absent sweating in the affected area  Radiology such as X-ray, CT scan or MRI may demonstrate a degenerative vertebra or prolapsed disc in the area that corresponds to the nerve supply to the affected skin  In many cases, no abnormality is revealed  Treatment is not always necessary, and it is not always successful  Typical management may include the following:  Cooling lotions or creams as required (camphor and menthol)  Topical steroids to treat associated lichen simplex  Capsaicin cream - this depletes nerve endings of their chemical transmitters but requires frequent reapplication and causes unpleasant local side effects  Local anesthetic creams may provide temporary relief of symptoms  Amitriptyline or other oral tricyclic medications at night to help sleep and counteract neuropathic symptoms  Gabapentin, pregabalin or other anticonvulsants can relieve unpleasant burning or tingling sensations  Transcutaneous electrical nerve stimulation (TENS)  Surgical decompression of vertebral nerve impingement    Physical therapy with repetitive exercises and stretches for the upper back has been reported to be effective      While sitting, cross arms and bend forwards to stretch upper back  Arms at sides, raise shoulders and rotate them forwards and backwards  Arms straight, rotate forwards 360 degrees and backwards 360 degrees  Rotate upper body left and right until stretch is felt and hold  Massage the muscles besides the spine in the affected area    Seborrheic keratosis patient reassured these are normal growths we acquire with age no treatment needed  History of skin cancer in no recurrence nothing else atypical sunblock recommended follow-up in 6 months  Screening for dermatologic disorders nothing else of concern noted on complete exam follow-up in 6 months  Treatment with Cryotherapy    The doctor has treated your skin with nitrogen, which is 320 degrees Fahrenheit below zero   He has given the treated area "frostbite "    Stinging should subside within a few hours  You can take Tylenol for pain, if needed  Over the next few days, it is normal if the area becomes reddened, a blood blister, or swollen with fluid  If the lesion treated was near the eye - you could get a swollen eye over the next few days  Do not panic! This is all temporary, and will resolve with time  There is no special treatment - just keep the area clean  Makeup and BandAids can be used, if preferred  When the area starts to dry up and peel off, using Vaseline can help healing  It usually takes up to a month for it to heal   Some lesions are recurrent and may require repeat treatments  If a lesion has not healed in one month, please don't hesitate to contact us  If you have any further questions that are not answered here, please call us  [phone]    Thank you for allowing us to care for you

## 2023-01-29 DIAGNOSIS — M81.0 AGE-RELATED OSTEOPOROSIS WITHOUT CURRENT PATHOLOGICAL FRACTURE: ICD-10-CM

## 2023-01-29 NOTE — TELEPHONE ENCOUNTER
Medication Refill Request     Name ibandronate (BONIVA) 150 MG tablet  Dose/FrequencyTake 1 tablet (150 mg total) by mouth every 30 (thirty) days   Quantity 3 tablet   Verified pharmacy   [x]  Verified ordering Provider   [x]  Does patient have enough for the next 3 days?  Yes [] No [x]

## 2023-01-30 DIAGNOSIS — M81.0 AGE-RELATED OSTEOPOROSIS WITHOUT CURRENT PATHOLOGICAL FRACTURE: ICD-10-CM

## 2023-01-30 RX ORDER — IBANDRONATE SODIUM 150 MG/1
150 TABLET, FILM COATED ORAL
Qty: 3 TABLET | Refills: 4 | Status: SHIPPED | OUTPATIENT
Start: 2023-01-30

## 2023-01-30 RX ORDER — IBANDRONATE SODIUM 150 MG/1
150 TABLET, FILM COATED ORAL
Qty: 3 TABLET | Refills: 3 | Status: SHIPPED | OUTPATIENT
Start: 2023-01-30 | End: 2023-01-30 | Stop reason: SDUPTHER

## 2023-02-16 DIAGNOSIS — E11.9 TYPE 2 DIABETES MELLITUS WITHOUT COMPLICATION, WITHOUT LONG-TERM CURRENT USE OF INSULIN (HCC): ICD-10-CM

## 2023-02-22 ENCOUNTER — RA CDI HCC (OUTPATIENT)
Dept: OTHER | Facility: HOSPITAL | Age: 73
End: 2023-02-22

## 2023-02-22 NOTE — PROGRESS NOTES
[address]  coding opportunities          Chart Reviewed number of suggestions sent to Provider: 1  [id number] [phone]     Patients Insurance     Medicare Insurance: [facility] Advantage

## 2023-03-01 ENCOUNTER — APPOINTMENT (OUTPATIENT)
Dept: LAB | Facility: CLINIC | Age: 73
End: 2023-03-01

## 2023-03-01 DIAGNOSIS — E03.9 HYPOTHYROIDISM, ACQUIRED: ICD-10-CM

## 2023-03-01 DIAGNOSIS — E11.9 TYPE 2 DIABETES MELLITUS WITHOUT COMPLICATION, WITHOUT LONG-TERM CURRENT USE OF INSULIN (HCC): ICD-10-CM

## 2023-03-01 LAB
ALBUMIN SERPL BCP-MCNC: 4.4 G/DL (ref 3.5–5)
ALP SERPL-CCNC: 74 U/L (ref 46–116)
ALT SERPL W P-5'-P-CCNC: 46 U/L (ref 12–78)
ANION GAP SERPL CALCULATED.3IONS-SCNC: 5 MMOL/L (ref 4–13)
AST SERPL W P-5'-P-CCNC: 34 U/L (ref 5–45)
BASOPHILS # BLD AUTO: 0.06 THOUSANDS/ÂΜL (ref 0–0.1)
BASOPHILS NFR BLD AUTO: 1 % (ref 0–1)
BILIRUB SERPL-MCNC: 0.46 MG/DL (ref 0.2–1)
BUN SERPL-MCNC: 14 MG/DL (ref 5–25)
CALCIUM SERPL-MCNC: 9.3 MG/DL (ref 8.3–10.1)
CHLORIDE SERPL-SCNC: 105 MMOL/L (ref 96–108)
CHOLEST SERPL-MCNC: 141 MG/DL
CO2 SERPL-SCNC: 24 MMOL/L (ref 21–32)
CREAT SERPL-MCNC: 0.7 MG/DL (ref 0.6–1.3)
EOSINOPHIL # BLD AUTO: 0.21 THOUSAND/ÂΜL (ref 0–0.61)
EOSINOPHIL NFR BLD AUTO: 3 % (ref 0–6)
ERYTHROCYTE [DISTWIDTH] IN BLOOD BY AUTOMATED COUNT: 13.4 % (ref 11.6–15.1)
GFR SERPL CREATININE-BSD FRML MDRD: 88 ML/MIN/1.73SQ M
GLUCOSE P FAST SERPL-MCNC: 190 MG/DL (ref 65–99)
HCT VFR BLD AUTO: 40.6 % (ref 34.8–46.1)
HDLC SERPL-MCNC: 46 MG/DL
HGB BLD-MCNC: 12.7 G/DL (ref 11.5–15.4)
IMM GRANULOCYTES # BLD AUTO: 0.02 THOUSAND/UL (ref 0–0.2)
IMM GRANULOCYTES NFR BLD AUTO: 0 % (ref 0–2)
LDLC SERPL CALC-MCNC: 65 MG/DL (ref 0–100)
LYMPHOCYTES # BLD AUTO: 2.02 THOUSANDS/ÂΜL (ref 0.6–4.47)
LYMPHOCYTES NFR BLD AUTO: 30 % (ref 14–44)
MCH RBC QN AUTO: 27.5 PG (ref 26.8–34.3)
MCHC RBC AUTO-ENTMCNC: 31.3 G/DL (ref 31.4–37.4)
MCV RBC AUTO: 88 FL (ref 82–98)
MONOCYTES # BLD AUTO: 0.39 THOUSAND/ÂΜL (ref 0.17–1.22)
MONOCYTES NFR BLD AUTO: 6 % (ref 4–12)
NEUTROPHILS # BLD AUTO: 3.99 THOUSANDS/ÂΜL (ref 1.85–7.62)
NEUTS SEG NFR BLD AUTO: 60 % (ref 43–75)
NONHDLC SERPL-MCNC: 95 MG/DL
NRBC BLD AUTO-RTO: 0 /100 WBCS
PLATELET # BLD AUTO: 111 THOUSANDS/UL (ref 149–390)
PMV BLD AUTO: 12.2 FL (ref 8.9–12.7)
POTASSIUM SERPL-SCNC: 3.8 MMOL/L (ref 3.5–5.3)
PROT SERPL-MCNC: 7.6 G/DL (ref 6.4–8.4)
RBC # BLD AUTO: 4.62 MILLION/UL (ref 3.81–5.12)
SODIUM SERPL-SCNC: 134 MMOL/L (ref 135–147)
T4 FREE SERPL-MCNC: 0.97 NG/DL (ref 0.76–1.46)
TRIGL SERPL-MCNC: 152 MG/DL
TSH SERPL DL<=0.05 MIU/L-ACNC: 1.02 UIU/ML (ref 0.45–4.5)
WBC # BLD AUTO: 6.69 THOUSAND/UL (ref 4.31–10.16)

## 2023-03-01 RX ORDER — BLOOD SUGAR DIAGNOSTIC
STRIP MISCELLANEOUS
Qty: 100 STRIP | Refills: 1 | Status: SHIPPED | OUTPATIENT
Start: 2023-03-01

## 2023-03-02 LAB
EST. AVERAGE GLUCOSE BLD GHB EST-MCNC: 186 MG/DL
HBA1C MFR BLD: 8.1 %

## 2023-03-30 ENCOUNTER — TELEPHONE (OUTPATIENT)
Dept: INTERNAL MEDICINE CLINIC | Facility: CLINIC | Age: 73
End: 2023-03-30

## 2023-03-30 ENCOUNTER — OFFICE VISIT (OUTPATIENT)
Dept: INTERNAL MEDICINE CLINIC | Facility: CLINIC | Age: 73
End: 2023-03-30

## 2023-03-30 VITALS
TEMPERATURE: 98.5 F | HEART RATE: 78 BPM | OXYGEN SATURATION: 99 % | WEIGHT: 132 LBS | RESPIRATION RATE: 16 BRPM | SYSTOLIC BLOOD PRESSURE: 136 MMHG | BODY MASS INDEX: 25.91 KG/M2 | DIASTOLIC BLOOD PRESSURE: 78 MMHG | HEIGHT: 60 IN

## 2023-03-30 DIAGNOSIS — E78.2 MIXED HYPERLIPIDEMIA: ICD-10-CM

## 2023-03-30 DIAGNOSIS — E11.9 TYPE 2 DIABETES MELLITUS WITHOUT COMPLICATION, WITHOUT LONG-TERM CURRENT USE OF INSULIN (HCC): ICD-10-CM

## 2023-03-30 DIAGNOSIS — Z00.00 MEDICARE ANNUAL WELLNESS VISIT, SUBSEQUENT: Primary | ICD-10-CM

## 2023-03-30 DIAGNOSIS — E03.9 HYPOTHYROIDISM, ACQUIRED: ICD-10-CM

## 2023-03-30 DIAGNOSIS — Z23 NEED FOR PNEUMOCOCCAL VACCINATION: ICD-10-CM

## 2023-03-30 LAB
LEFT EYE DIABETIC RETINOPATHY: NORMAL
LEFT EYE IMAGE QUALITY: NORMAL
LEFT EYE MACULAR EDEMA: NORMAL
LEFT EYE OTHER RETINOPATHY: NORMAL
RIGHT EYE DIABETIC RETINOPATHY: NORMAL
RIGHT EYE IMAGE QUALITY: NORMAL
RIGHT EYE MACULAR EDEMA: NORMAL
RIGHT EYE OTHER RETINOPATHY: NORMAL
SEVERITY (EYE EXAM): NORMAL

## 2023-03-30 NOTE — PATIENT INSTRUCTIONS
Medicare Preventive Visit Patient Instructions  Thank you for completing your Welcome to Medicare Visit or Medicare Annual Wellness Visit today  Your next wellness visit will be due in one year (3/30/2024)  The screening/preventive services that you may require over the next 5-10 years are detailed below  Some tests may not apply to you based off risk factors and/or age  Screening tests ordered at today's visit but not completed yet may show as past due  Also, please note that scanned in results may not display below  Preventive Screenings:  Service Recommendations Previous Testing/Comments   Colorectal Cancer Screening  * Colonoscopy    * Fecal Occult Blood Test (FOBT)/Fecal Immunochemical Test (FIT)  * Fecal DNA/Cologuard Test  * Flexible Sigmoidoscopy Age: 39-70 years old   Colonoscopy: every 10 years (may be performed more frequently if at higher risk)  OR  FOBT/FIT: every 1 year  OR  Cologuard: every 3 years  OR  Sigmoidoscopy: every 5 years  Screening may be recommended earlier than age 39 if at higher risk for colorectal cancer  Also, an individualized decision between you and your healthcare provider will decide whether screening between the ages of 74-80 would be appropriate  Colonoscopy: 05/18/2022  FOBT/FIT: Not on file  Cologuard: 05/18/2022  Sigmoidoscopy: Not on file    Screening Current     Breast Cancer Screening Age: 36 years old  Frequency: every 1-2 years  Not required if history of left and right mastectomy Mammogram: 01/12/2023    Screening Current   Cervical Cancer Screening Between the ages of 21-29, pap smear recommended once every 3 years  Between the ages of 33-67, can perform pap smear with HPV co-testing every 5 years     Recommendations may differ for women with a history of total hysterectomy, cervical cancer, or abnormal pap smears in past  Pap Smear: Not on file    Screening Not Indicated   Hepatitis C Screening Once for adults born between 1945 and 1965  More frequently in patients at high risk for Hepatitis C Hep C Antibody: 01/28/2019    Screening Current   Diabetes Screening 1-2 times per year if you're at risk for diabetes or have pre-diabetes Fasting glucose: 190 mg/dL (3/1/2023)  A1C: 8 1 % (3/1/2023)  Screening Not Indicated  History Diabetes   Cholesterol Screening Once every 5 years if you don't have a lipid disorder  May order more often based on risk factors  Lipid panel: 03/01/2023    Screening Not Indicated  History Lipid Disorder     Other Preventive Screenings Covered by Medicare:  1  Abdominal Aortic Aneurysm (AAA) Screening: covered once if your at risk  You're considered to be at risk if you have a family history of AAA  2  Lung Cancer Screening: covers low dose CT scan once per year if you meet all of the following conditions: (1) Age 50-69; (2) No signs or symptoms of lung cancer; (3) Current smoker or have quit smoking within the last 15 years; (4) You have a tobacco smoking history of at least 20 pack years (packs per day multiplied by number of years you smoked); (5) You get a written order from a healthcare provider  3  Glaucoma Screening: covered annually if you're considered high risk: (1) You have diabetes OR (2) Family history of glaucoma OR (3)  aged 48 and older OR (3)  American aged 72 and older  3  Osteoporosis Screening: covered every 2 years if you meet one of the following conditions: (1) You're estrogen deficient and at risk for osteoporosis based off medical history and other findings; (2) Have a vertebral abnormality; (3) On glucocorticoid therapy for more than 3 months; (4) Have primary hyperparathyroidism; (5) On osteoporosis medications and need to assess response to drug therapy  · Last bone density test (DXA Scan): 12/10/2018  5  HIV Screening: covered annually if you're between the age of 12-76  Also covered annually if you are younger than 13 and older than 72 with risk factors for HIV infection   For pregnant patients, it is covered up to 3 times per pregnancy  Immunizations:  Immunization Recommendations   Influenza Vaccine Annual influenza vaccination during flu season is recommended for all persons aged >= 6 months who do not have contraindications   Pneumococcal Vaccine   * Pneumococcal conjugate vaccine = PCV13 (Prevnar 13), PCV15 (Vaxneuvance), PCV20 (Prevnar 20)  * Pneumococcal polysaccharide vaccine = PPSV23 (Pneumovax) Adults 25-60 years old: 1-3 doses may be recommended based on certain risk factors  Adults 72 years old: 1-2 doses may be recommended based off what pneumonia vaccine you previously received   Hepatitis B Vaccine 3 dose series if at intermediate or high risk (ex: diabetes, end stage renal disease, liver disease)   Tetanus (Td) Vaccine - COST NOT COVERED BY MEDICARE PART B Following completion of primary series, a booster dose should be given every 10 years to maintain immunity against tetanus  Td may also be given as tetanus wound prophylaxis  Tdap Vaccine - COST NOT COVERED BY MEDICARE PART B Recommended at least once for all adults  For pregnant patients, recommended with each pregnancy  Shingles Vaccine (Shingrix) - COST NOT COVERED BY MEDICARE PART B  2 shot series recommended in those aged 48 and above     Health Maintenance Due:      Topic Date Due   • Breast Cancer Screening: Mammogram  01/12/2024   • Colorectal Cancer Screening  05/18/2025   • Hepatitis C Screening  Completed     Immunizations Due:  There are no preventive care reminders to display for this patient  Advance Directives   What are advance directives? Advance directives are legal documents that state your wishes and plans for medical care  These plans are made ahead of time in case you lose your ability to make decisions for yourself  Advance directives can apply to any medical decision, such as the treatments you want, and if you want to donate organs  What are the types of advance directives?   There are many types of advance directives, and each state has rules about how to use them  You may choose a combination of any of the following:  · Living will: This is a written record of the treatment you want  You can also choose which treatments you do not want, which to limit, and which to stop at a certain time  This includes surgery, medicine, IV fluid, and tube feedings  · Durable power of  for healthcare [facility]): This is a written record that states who you want to make healthcare choices for you when you are unable to make them for yourself  This person, called a proxy, is usually a family member or a friend  You may choose more than 1 proxy  · Do not resuscitate (DNR) order:  A DNR order is used in case your heart stops beating or you stop breathing  It is a request not to have certain forms of treatment, such as CPR  A DNR order may be included in other types of advance directives  · Medical directive: This covers the care that you want if you are in a coma, near death, or unable to make decisions for yourself  You can list the treatments you want for each condition  Treatment may include pain medicine, surgery, blood transfusions, dialysis, IV or tube feedings, and a ventilator (breathing machine)  · Values history: This document has questions about your views, beliefs, and how you feel and think about life  This information can help others choose the care that you would choose  Why are advance directives important? An advance directive helps you control your care  Although spoken wishes may be used, it is better to have your wishes written down  Spoken wishes can be misunderstood, or not followed  Treatments may be given even if you do not want them  An advance directive may make it easier for your family to make difficult choices about your care  Urinary Incontinence   Urinary incontinence (UI)  is when you lose control of your bladder   UI develops because your bladder cannot store or empty urine properly  The 3 most common types of UI are stress incontinence, urge incontinence, or both  Medicines:   · May be given to help strengthen your bladder control  Report any side effects of medication to your healthcare provider  Do pelvic muscle exercises often:  Your pelvic muscles help you stop urinating  Squeeze these muscles tight for 5 seconds, then relax for 5 seconds  Gradually work up to squeezing for 10 seconds  Do 3 sets of 15 repetitions a day, or as directed  This will help strengthen your pelvic muscles and improve bladder control  Train your bladder:  Go to the bathroom at set times, such as every 2 hours, even if you do not feel the urge to go  You can also try to hold your urine when you feel the urge to go  For example, hold your urine for 5 minutes when you feel the urge to go  As that becomes easier, hold your urine for 10 minutes  Self-care:   · Keep a UI record  Write down how often you leak urine and how much you leak  Make a note of what you were doing when you leaked urine  · Drink liquids as directed  You may need to limit the amount of liquid you drink to help control your urine leakage  Do not drink any liquid right before you go to bed  Limit or do not have drinks that contain caffeine or alcohol  · Prevent constipation  Eat a variety of high-fiber foods  Good examples are high-fiber cereals, beans, vegetables, and whole-grain breads  Walking is the best way to trigger your intestines to have a bowel movement  · Exercise regularly and maintain a healthy weight  Weight loss and exercise will decrease pressure on your bladder and help you control your leakage  · Use a catheter as directed  to help empty your bladder  A catheter is a tiny, plastic tube that is put into your bladder to drain your urine  · Go to behavior therapy as directed  Behavior therapy may be used to help you learn to control your urge to urinate      Weight Management   Why it is important to manage your weight:  Being overweight increases your risk of health conditions such as heart disease, high blood pressure, type 2 diabetes, and certain types of cancer  It can also increase your risk for osteoarthritis, sleep apnea, and other respiratory problems  Aim for a slow, steady weight loss  Even a small amount of weight loss can lower your risk of health problems  How to lose weight safely:  A safe and healthy way to lose weight is to eat fewer calories and get regular exercise  You can lose up about 1 pound a week by decreasing the number of calories you eat by 500 calories each day  Healthy meal plan for weight management:  A healthy meal plan includes a variety of foods, contains fewer calories, and helps you stay healthy  A healthy meal plan includes the following:  · Eat whole-grain foods more often  A healthy meal plan should contain fiber  Fiber is the part of grains, fruits, and vegetables that is not broken down by your body  Whole-grain foods are healthy and provide extra fiber in your diet  Some examples of whole-grain foods are whole-wheat breads and pastas, oatmeal, brown rice, and bulgur  · Eat a variety of vegetables every day  Include dark, leafy greens such as spinach, kale, keli greens, and mustard greens  Eat yellow and orange vegetables such as carrots, sweet potatoes, and winter squash  · Eat a variety of fruits every day  Choose fresh or canned fruit (canned in its own juice or light syrup) instead of juice  Fruit juice has very little or no fiber  · Eat low-fat dairy foods  Drink fat-free (skim) milk or 1% milk  Eat fat-free yogurt and low-fat cottage cheese  Try low-fat cheeses such as mozzarella and other reduced-fat cheeses  · Choose meat and other protein foods that are low in fat  Choose beans or other legumes such as split peas or lentils  Choose fish, skinless poultry (chicken or turkey), or lean cuts of red meat (beef or pork)   Before you cook meat or poultry, cut off any visible fat  · Use less fat and oil  Try baking foods instead of frying them  Add less fat, such as margarine, sour cream, regular salad dressing and mayonnaise to foods  Eat fewer high-fat foods  Some examples of high-fat foods include french fries, doughnuts, ice cream, and cakes  · Eat fewer sweets  Limit foods and drinks that are high in sugar  This includes candy, cookies, regular soda, and sweetened drinks  Exercise:  Exercise at least 30 minutes per day on most days of the week  Some examples of exercise include walking, biking, dancing, and swimming  You can also fit in more physical activity by taking the stairs instead of the elevator or parking farther away from stores  Ask your healthcare provider about the best exercise plan for you  © Copyright [employer] 2018 Information is for End User's use only and may not be sold, redistributed or otherwise used for commercial purposes   All illustrations and images included in CareNotes® are the copyrighted property of [employer]  or [address] [place] [address]

## 2023-03-30 NOTE — PROGRESS NOTES
Assessment and Plan:     Problem List Items Addressed This Visit        Endocrine    Hypothyroidism, acquired    Relevant Orders    T4, free    TSH, 3rd generation    Type 2 diabetes mellitus without complication, without long-term current use of insulin (HCC)    Relevant Orders    IRIS Diabetic eye exam    IRIS Diabetic eye exam    Comprehensive metabolic panel    CBC and differential    Hemoglobin A1C    Lipid panel       Other    Mixed hyperlipidemia   Other Visit Diagnoses     Medicare annual wellness visit, subsequent    -  Primary    Need for pneumococcal vaccination        Relevant Orders    Pneumococcal Conjugate Vaccine 20-valent (Pcv20) (Completed)      Chronic problems are stable except for the sugar  She feels her diet was poor  Wants to have 1 more chance  Otherwise, we need to increase her meds  Ordered labs for next visit  Continue other medications  BMI Counseling: Body mass index is 25 78 kg/m²  The BMI is above normal  Nutrition recommendations include encouraging healthy choices of fruits and vegetables  Rationale for BMI follow-up plan is due to patient being overweight or obese  Depression Screening and Follow-up Plan: Patient was screened for depression during today's encounter  They screened negative with a PHQ-2 score of 0  Preventive health issues were discussed with patient, and age appropriate screening tests were ordered as noted in patient's After Visit Summary  Personalized health advice and appropriate referrals for health education or preventive services given if needed, as noted in patient's After Visit Summary  History of Present Illness:     Patient presents for a Medicare Wellness Visit    Patient comes in today for routine follow-up and Medicare wellness  She states she is just getting over a cold  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  Tested negative for COVID  She is doing better    Her blood pressure is controlled  Thyroid levels are controlled  Sugars have gone up again but she states her diet was not good  She even had brownies the night before her blood work  Patient Care Team:  Keli Barahona MD as PCP - General (Internal Medicine)  Olive Callahan MD     Review of Systems:     Review of Systems   Respiratory: Negative for shortness of breath  Cardiovascular: Negative for chest pain  Gastrointestinal: Negative for abdominal pain  Problem List:     Patient Active Problem List   Diagnosis   • Basal cell carcinoma of back   • Multiple sclerosis (HCC)   • Hypothyroidism, acquired   • Mixed hyperlipidemia   • Age related osteoporosis   • Type 2 diabetes mellitus without complication, without long-term current use of insulin ([facility] )      Past Medical and Surgical History:     No past medical history on file  Past Surgical History:   Procedure Laterality Date   • MYRINGOTOMY W/ TUBES        Family History:     Family History   Problem Relation Age of Onset   • Thyroid cancer Mother    • Prostate cancer Father       Social History:     Social History     Socioeconomic History   • Marital status: /Civil Union     Spouse name: None   • Number of children: None   • Years of education: None   • Highest education level: None   Occupational History   • None   Tobacco Use   • Smoking status: Never   • Smokeless tobacco: Never   Vaping Use   • Vaping Use: Never used   Substance and Sexual Activity   • Alcohol use: Not Currently   • Drug use: Never   • Sexual activity: Yes   Other Topics Concern   • None   Social History Narrative   • None     Social Determinants of Health     Financial Resource Strain: Low Risk    • Difficulty of Paying Living Expenses: Not hard at all   Food Insecurity: Not on file   Transportation Needs: No Transportation Needs   • Lack of Transportation (Medical): No   • Lack of Transportation (Non-Medical):  No   Physical Activity: Not on file   Stress: Not on file   Social Connections: Not on file   Intimate Partner Violence: Not on file   Housing Stability: Not on file      Medications and Allergies:     Current Outpatient Medications   Medication Sig Dispense Refill   • Accu-Chek Guide test strip TEST BLOOD SUGAR ONE TIME DAILY AS DIRECTED 100 strip 1   • Accu-Chek Softclix Lancets lancets TEST BLOOD SUGAR ONE TIME DAILY AS DIRECTED 100 each 1   • Blood Glucose Monitoring Suppl (Accu-Chek Marsha Plus) w/Device KIT USE AS DIRECTED 1 kit 1   • busPIRone (BUSPAR) 10 mg tablet Take 1 tablet (10 mg total) by mouth 2 (two) times a day as needed (anxiety) 180 tablet 3   • cholecalciferol (VITAMIN D3) 1,000 units tablet Take 1,000 Units by mouth daily     • Elastic Bandages & Supports (Wrist Brace/Left Medium) MISC Use in the morning 1 each 3   • fluticasone (FLONASE) 50 mcg/act nasal spray      • ibandronate (BONIVA) 150 MG tablet Take 1 tablet (150 mg total) by mouth every 30 (thirty) days 3 tablet 4   • levocetirizine (XYZAL) 5 MG tablet      • levothyroxine 50 mcg tablet TAKE 1 TABLET EVERY DAY 90 tablet 3   • metFORMIN (GLUCOPHAGE) 1000 MG tablet Take 1 tablet (1,000 mg total) by mouth 2 (two) times a day with meals 180 tablet 1   • methocarbamol (ROBAXIN) 500 mg tablet Take 500 mg by mouth if needed for muscle spasms     • rosuvastatin (CRESTOR) 10 MG tablet TAKE 1 TABLET AT BEDTIME 90 tablet 1   • TURMERIC PO Take by mouth       No current facility-administered medications for this visit       Allergies   Allergen Reactions   • Bee Venom Shortness Of Breath and Swelling   • Latex Rash      Immunizations:     Immunization History   Administered Date(s) Administered   • COVID-19 PFIZER VACCINE 0 3 ML IM 03/26/2021, 04/16/2021, 10/20/2021   • COVID-19 Pfizer Vac BIVALENT Sedrick-sucrose 12 Yr+ IM (BOOSTER ONLY) 12/13/2022, 12/13/2022   • COVID-19 Pfizer vac (Sedrick-sucrose, gray cap) 12 yr+ IM 07/12/2022   • INFLUENZA 09/20/2016, 01/21/2018, 09/24/2021, 10/26/2022   • Influenza, high dose seasonal 0 7 mL 01/25/2019, 11/13/2019, 09/17/2020, 10/26/2022   • Pneumococcal Conjugate 13-Valent 12/11/2015, 06/27/2018   • Pneumococcal Conjugate Vaccine 20-valent (Pcv20), Polysace 03/30/2023   • Pneumococcal Polysaccharide PPV23 07/01/2019   • Tdap 02/02/2018   • Tuberculin Skin Test-PPD Intradermal 05/17/2017      Health Maintenance:         Topic Date Due   • Breast Cancer Screening: Mammogram  01/12/2024   • Colorectal Cancer Screening  05/18/2025   • Hepatitis C Screening  Completed     There are no preventive care reminders to display for this patient  Medicare Screening Tests and Risk Assessments:     Bety is here for her Subsequent Wellness visit  Health Risk Assessment:   Patient rates overall health as very good  Patient feels that their physical health rating is much better  Patient is very satisfied with their life  Eyesight was rated as same  Hearing was rated as much worse  Patient feels that their emotional and mental health rating is much better  Patients states they are never, rarely angry  Patient states they are sometimes unusually tired/fatigued  Pain experienced in the last 7 days has been some  Patient's pain rating has been 5/10  Patient states that she has experienced no weight loss or gain in last 6 months  Depression Screening:   PHQ-2 Score: 0      Fall Risk Screening: In the past year, patient has experienced: no history of falling in past year      Urinary Incontinence Screening:   Patient has leaked urine accidently in the last six months  Home Safety:  Patient does not have trouble with stairs inside or outside of their home  Patient has working smoke alarms and has working carbon monoxide detector  Home safety hazards include: none  Nutrition:   Current diet is Diabetic  Medications:   Patient is currently taking over-the-counter supplements  OTC medications include: Vitaminwater E, Hair, Skin and Nails Vitamins, and a multivitamin   Patient is able to manage medications  Activities of Daily Living (ADLs)/Instrumental Activities of Daily Living (IADLs):   Walk and transfer into and out of bed and chair?: Yes  Dress and groom yourself?: Yes    Bathe or shower yourself?: Yes    Feed yourself? Yes  Do your laundry/housekeeping?: Yes  Manage your money, pay your bills and track your expenses?: Yes  Make your own meals?: Yes    Do your own shopping?: Yes    Durable Medical Equipment Suppliers  None    Previous Hospitalizations:   Any hospitalizations or ED visits within the last 12 months?: No      Advance Care Planning:   Living will: Yes    Durable POA for healthcare: Yes    Advanced directive: Yes    Advanced directive counseling given: Yes      Cognitive Screening:   Provider or family/friend/caregiver concerned regarding cognition?: No    PREVENTIVE SCREENINGS      Cardiovascular Screening:    General: Screening Not Indicated and History Lipid Disorder      Diabetes Screening:     General: Screening Not Indicated and History Diabetes      Colorectal Cancer Screening:     General: Screening Current      Breast Cancer Screening:     General: Screening Current      Cervical Cancer Screening:    General: Screening Not Indicated      Osteoporosis Screening:    General: Screening Not Indicated and History Osteoporosis      Abdominal Aortic Aneurysm (AAA) Screening:        General: Screening Not Indicated      Lung Cancer Screening:     General: Screening Not Indicated      Hepatitis C Screening:    General: Screening Current    Screening, Brief Intervention, and Referral to Treatment (SBIRT)    Screening  Typical number of drinks in a day: 0  Typical number of drinks in a week: 0  Interpretation: Low risk drinking behavior      AUDIT-C Screenin) How often did you have a drink containing alcohol in the past year? never  2) How many drinks did you have on a typical day when you were drinking in the past year? 0  3) How often did you have 6 or more drinks on one "occasion in the past year? never    AUDIT-C Score: 0  Interpretation: Score 0-2 (female): Negative screen for alcohol misuse    Single Item Drug Screening:  How often have you used an illegal drug (including marijuana) or a prescription medication for non-medical reasons in the past year? monthly    Single Item Drug Screen Score: 2  Interpretation: POSITIVE screen for possible drug use disorder    Drug Abuse Screening Test (DAST-10):  1) Have you used drugs other than those required for medical reasons? No  2) Do you abuse more than one drug at a time? No  3) Are you always able to stop using drugs when you want to? Yes  4) Have you had \"blackouts\" or \"flashbacks\" as a result of drug use? No  5) Do you ever feel bad or guilty about your drug use? No  6) Does your spouse (or parents) ever complain about your involvement with drugs? No  7) Have you neglected your family because of your use of drugs? No  8) Have you engaged in illegal activities in order to obtain drugs? No  9) Have you ever experienced withdrawal symptoms (felt sick) when you stopped taking drugs? No  10) Have you had medical problems as a result of your drug use (e g , memory loss, hepatitis, convulsions, bleeding, etc )? No    DAST-10 Score: 0  Interpretation: No problems reported    Brief Intervention  Alcohol & drug use screenings were reviewed  No concerns regarding substance use disorder identified  No results found  Physical Exam:     /78 (BP Location: Left arm, Patient Position: Sitting, Cuff Size: Standard)   Pulse 78   Temp 98 5 °F (36 9 °C) (Tympanic)   Resp 16   Ht 5' (1 524 m)   Wt 59 9 kg (132 lb)   LMP  (LMP Unknown)   SpO2 99%   BMI 25 78 kg/m²     Physical Exam  Vitals and nursing note reviewed  Constitutional:       Appearance: She is well-developed  Cardiovascular:      Rate and Rhythm: Normal rate and regular rhythm     Pulmonary:      Effort: Pulmonary effort is normal       Breath sounds: Normal breath " sounds  Abdominal:      General: Abdomen is flat  Tenderness: There is no abdominal tenderness  Musculoskeletal:      Right lower leg: No edema  Left lower leg: No edema  Neurological:      Mental Status: She is alert and oriented to person, place, and time  Psychiatric:         Behavior: Behavior normal          Thought Content:  Thought content normal          Judgment: Judgment normal           Amanda Cisneros MD

## 2023-03-30 NOTE — TELEPHONE ENCOUNTER
Patient asked if you can please give her the name of the orthopedic doctor you referred her to previously  Patient would also like a script for a strong antihistamine for when she travels in May  Please advise  Meena Ybarra

## 2023-06-13 ENCOUNTER — VBI (OUTPATIENT)
Dept: ADMINISTRATIVE | Facility: OTHER | Age: 73
End: 2023-06-13

## 2023-07-12 DIAGNOSIS — J30.9 ALLERGIC RHINITIS, UNSPECIFIED SEASONALITY, UNSPECIFIED TRIGGER: Primary | ICD-10-CM

## 2023-07-12 RX ORDER — LEVOCETIRIZINE DIHYDROCHLORIDE 5 MG/1
5 TABLET, FILM COATED ORAL EVERY EVENING
Qty: 90 TABLET | Refills: 3 | Status: SHIPPED | OUTPATIENT
Start: 2023-07-12

## 2023-08-08 ENCOUNTER — VBI (OUTPATIENT)
Dept: ADMINISTRATIVE | Facility: OTHER | Age: 73
End: 2023-08-08

## 2023-08-18 DIAGNOSIS — E78.2 MIXED HYPERLIPIDEMIA: ICD-10-CM

## 2023-08-18 RX ORDER — ROSUVASTATIN CALCIUM 10 MG/1
TABLET, COATED ORAL
Qty: 90 TABLET | Refills: 1 | Status: SHIPPED | OUTPATIENT
Start: 2023-08-18

## 2023-08-23 DIAGNOSIS — M81.0 AGE-RELATED OSTEOPOROSIS WITHOUT CURRENT PATHOLOGICAL FRACTURE: ICD-10-CM

## 2023-08-23 RX ORDER — IBANDRONATE SODIUM 150 MG/1
150 TABLET, FILM COATED ORAL
Qty: 1 TABLET | Refills: 0 | Status: SHIPPED | OUTPATIENT
Start: 2023-08-23

## 2023-08-23 RX ORDER — IBANDRONATE SODIUM 150 MG/1
150 TABLET, FILM COATED ORAL
Qty: 3 TABLET | Refills: 4 | Status: SHIPPED | OUTPATIENT
Start: 2023-08-23 | End: 2023-08-23 | Stop reason: SDUPTHER

## 2023-08-23 NOTE — TELEPHONE ENCOUNTER
Patient is requesting a script for one tablet of ibandronate 150 mg, to be sent to pharmacy. It will take 7 to 10 days for her mail order to come and she needs to take one tablet by Monday.     [facility]    Call back #[phone]

## 2023-08-23 NOTE — TELEPHONE ENCOUNTER
My name is Radha . My phone number is [phone]. My insurance company, [employer] has a [facility] pharmacy that I get my medications from. They I'm they said I am out of refills on ibandronate sodium. I'm supposed to be taking it on Monday the 29th, 28th and I don't have any more left. So if you could possibly reach out to [facility] pharmacy, which is formerly [facility] and put that refill in so I can possibly get a rush order on that ibandronate, I would appreciate it. Again, my number is [phone]. Thank you.

## 2023-08-30 ENCOUNTER — APPOINTMENT (OUTPATIENT)
Dept: LAB | Facility: CLINIC | Age: 73
End: 2023-08-30
Payer: COMMERCIAL

## 2023-08-30 DIAGNOSIS — E03.9 HYPOTHYROIDISM, ACQUIRED: ICD-10-CM

## 2023-08-30 DIAGNOSIS — E11.9 TYPE 2 DIABETES MELLITUS WITHOUT COMPLICATION, WITHOUT LONG-TERM CURRENT USE OF INSULIN (HCC): ICD-10-CM

## 2023-08-30 LAB
ALBUMIN SERPL BCP-MCNC: 4.6 G/DL (ref 3.5–5)
ALP SERPL-CCNC: 80 U/L (ref 34–104)
ALT SERPL W P-5'-P-CCNC: 68 U/L (ref 7–52)
ANION GAP SERPL CALCULATED.3IONS-SCNC: 9 MMOL/L
AST SERPL W P-5'-P-CCNC: 47 U/L (ref 13–39)
BASOPHILS # BLD AUTO: 0.06 THOUSANDS/ÂΜL (ref 0–0.1)
BASOPHILS NFR BLD AUTO: 1 % (ref 0–1)
BILIRUB SERPL-MCNC: 0.43 MG/DL (ref 0.2–1)
BUN SERPL-MCNC: 11 MG/DL (ref 5–25)
CALCIUM SERPL-MCNC: 9.9 MG/DL (ref 8.4–10.2)
CHLORIDE SERPL-SCNC: 98 MMOL/L (ref 96–108)
CHOLEST SERPL-MCNC: 123 MG/DL
CO2 SERPL-SCNC: 28 MMOL/L (ref 21–32)
CREAT SERPL-MCNC: 0.7 MG/DL (ref 0.6–1.3)
EOSINOPHIL # BLD AUTO: 0.18 THOUSAND/ÂΜL (ref 0–0.61)
EOSINOPHIL NFR BLD AUTO: 3 % (ref 0–6)
ERYTHROCYTE [DISTWIDTH] IN BLOOD BY AUTOMATED COUNT: 13.5 % (ref 11.6–15.1)
EST. AVERAGE GLUCOSE BLD GHB EST-MCNC: 212 MG/DL
GFR SERPL CREATININE-BSD FRML MDRD: 88 ML/MIN/1.73SQ M
GLUCOSE P FAST SERPL-MCNC: 182 MG/DL (ref 65–99)
HBA1C MFR BLD: 9 %
HCT VFR BLD AUTO: 40 % (ref 34.8–46.1)
HDLC SERPL-MCNC: 39 MG/DL
HGB BLD-MCNC: 12.6 G/DL (ref 11.5–15.4)
IMM GRANULOCYTES # BLD AUTO: 0.02 THOUSAND/UL (ref 0–0.2)
IMM GRANULOCYTES NFR BLD AUTO: 0 % (ref 0–2)
LDLC SERPL CALC-MCNC: 49 MG/DL (ref 0–100)
LYMPHOCYTES # BLD AUTO: 1.97 THOUSANDS/ÂΜL (ref 0.6–4.47)
LYMPHOCYTES NFR BLD AUTO: 33 % (ref 14–44)
MCH RBC QN AUTO: 28.1 PG (ref 26.8–34.3)
MCHC RBC AUTO-ENTMCNC: 31.5 G/DL (ref 31.4–37.4)
MCV RBC AUTO: 89 FL (ref 82–98)
MONOCYTES # BLD AUTO: 0.56 THOUSAND/ÂΜL (ref 0.17–1.22)
MONOCYTES NFR BLD AUTO: 9 % (ref 4–12)
NEUTROPHILS # BLD AUTO: 3.22 THOUSANDS/ÂΜL (ref 1.85–7.62)
NEUTS SEG NFR BLD AUTO: 54 % (ref 43–75)
NONHDLC SERPL-MCNC: 84 MG/DL
NRBC BLD AUTO-RTO: 0 /100 WBCS
PLATELET # BLD AUTO: 236 THOUSANDS/UL (ref 149–390)
PMV BLD AUTO: 12.2 FL (ref 8.9–12.7)
POTASSIUM SERPL-SCNC: 4.9 MMOL/L (ref 3.5–5.3)
PROT SERPL-MCNC: 7.6 G/DL (ref 6.4–8.4)
RBC # BLD AUTO: 4.48 MILLION/UL (ref 3.81–5.12)
SODIUM SERPL-SCNC: 135 MMOL/L (ref 135–147)
T4 FREE SERPL-MCNC: 0.9 NG/DL (ref 0.61–1.12)
TRIGL SERPL-MCNC: 173 MG/DL
TSH SERPL DL<=0.05 MIU/L-ACNC: 1.76 UIU/ML (ref 0.45–4.5)
WBC # BLD AUTO: 6.01 THOUSAND/UL (ref 4.31–10.16)

## 2023-08-30 PROCEDURE — 84443 ASSAY THYROID STIM HORMONE: CPT

## 2023-08-30 PROCEDURE — 85025 COMPLETE CBC W/AUTO DIFF WBC: CPT

## 2023-08-30 PROCEDURE — 83036 HEMOGLOBIN GLYCOSYLATED A1C: CPT

## 2023-08-30 PROCEDURE — 80053 COMPREHEN METABOLIC PANEL: CPT

## 2023-08-30 PROCEDURE — 84439 ASSAY OF FREE THYROXINE: CPT

## 2023-08-30 PROCEDURE — 80061 LIPID PANEL: CPT

## 2023-08-30 PROCEDURE — 36415 COLL VENOUS BLD VENIPUNCTURE: CPT

## 2023-09-06 ENCOUNTER — OFFICE VISIT (OUTPATIENT)
Dept: INTERNAL MEDICINE CLINIC | Facility: CLINIC | Age: 73
End: 2023-09-06
Payer: COMMERCIAL

## 2023-09-06 VITALS
RESPIRATION RATE: 16 BRPM | WEIGHT: 135.6 LBS | HEART RATE: 83 BPM | OXYGEN SATURATION: 98 % | BODY MASS INDEX: 26.62 KG/M2 | DIASTOLIC BLOOD PRESSURE: 82 MMHG | HEIGHT: 60 IN | SYSTOLIC BLOOD PRESSURE: 128 MMHG | TEMPERATURE: 97.5 F

## 2023-09-06 DIAGNOSIS — E03.9 HYPOTHYROIDISM, ACQUIRED: ICD-10-CM

## 2023-09-06 DIAGNOSIS — G35 MULTIPLE SCLEROSIS (HCC): ICD-10-CM

## 2023-09-06 DIAGNOSIS — E11.9 TYPE 2 DIABETES MELLITUS WITHOUT COMPLICATION, WITHOUT LONG-TERM CURRENT USE OF INSULIN (HCC): ICD-10-CM

## 2023-09-06 DIAGNOSIS — E11.9 TYPE 2 DIABETES MELLITUS WITHOUT COMPLICATION, WITHOUT LONG-TERM CURRENT USE OF INSULIN (HCC): Primary | ICD-10-CM

## 2023-09-06 DIAGNOSIS — E78.2 MIXED HYPERLIPIDEMIA: ICD-10-CM

## 2023-09-06 PROCEDURE — 99214 OFFICE O/P EST MOD 30 MIN: CPT | Performed by: INTERNAL MEDICINE

## 2023-09-06 RX ORDER — PIOGLITAZONEHYDROCHLORIDE 15 MG/1
15 TABLET ORAL DAILY
Qty: 90 TABLET | Refills: 1 | Status: SHIPPED | OUTPATIENT
Start: 2023-09-06

## 2023-09-06 NOTE — PROGRESS NOTES
Assessment/Plan:     Her diabetes is not controlled. She was nervous about getting hypoglycemic episodes with glipizide so she would prefer to take the pioglitazone. The medicines in the [facility] protocol would be too expensive for her. Reviewed the new medicine with her. We will start her at the lowest dose. Repeat CMP and A1c in 1 month on this. We can also recheck her liver enzymes which were slightly elevated but she was taking a lot of ibuprofen at the time because of a tooth ache. Her thyroid levels are controlled with her levothyroxine 50 mcg daily. Her chronic hyperlipidemia is stable with her rosuvastatin 10 mg daily. Her MS remains asymptomatic off of medicines. Quality Measures:   Depression Screening and Follow-up Plan: Patient was screened for depression during today's encounter. They screened negative with a PHQ-2 score of 0. Return in about 4 months (around 1/6/2024) for Recheck. No problem-specific Assessment & Plan notes found for this encounter. Diagnoses and all orders for this visit:    Type 2 diabetes mellitus without complication, without long-term current use of insulin (HCC)  -     Comprehensive metabolic panel; Future  -     Hemoglobin A1C; Future  -     pioglitazone (ACTOS) 15 mg tablet; Take 1 tablet (15 mg total) by mouth daily  -     Albumin / creatinine urine ratio; Future    Hypothyroidism, acquired    Mixed hyperlipidemia    Multiple sclerosis (HCC)          Subjective:      Patient ID: Ahsan Edward is a 81 y.o. female. Patient comes in today for routine follow-up. Her sugars are still not controlled. She did look up with her insurance what next medicine would be covered. Her options are limited because of affordability. It was either glipizide or pioglitazone. Her blood pressure is controlled. Blood work shows her thyroid levels and cholesterol are controlled. Her MS still continues not to bother her at all. She reports no new complaints today. No further additions to her history.       ALLERGIES:  Allergies   Allergen Reactions   • Bee Venom Shortness Of Breath and Swelling   • Latex Rash       CURRENT MEDICATIONS:    Current Outpatient Medications:   •  Accu-Chek Guide test strip, TEST BLOOD SUGAR ONE TIME DAILY AS DIRECTED, Disp: 100 strip, Rfl: 1  •  Accu-Chek Softclix Lancets lancets, TEST BLOOD SUGAR ONE TIME DAILY AS DIRECTED, Disp: 100 each, Rfl: 1  •  Blood Glucose Monitoring Suppl (Accu-Chek Marsha Plus) w/Device KIT, USE AS DIRECTED, Disp: 1 kit, Rfl: 1  •  busPIRone (BUSPAR) 10 mg tablet, Take 1 tablet (10 mg total) by mouth 2 (two) times a day as needed (anxiety), Disp: 180 tablet, Rfl: 3  •  cholecalciferol (VITAMIN D3) 1,000 units tablet, Take 1,000 Units by mouth daily, Disp: , Rfl:   •  fluticasone (FLONASE) 50 mcg/act nasal spray, , Disp: , Rfl:   •  ibandronate (BONIVA) 150 MG tablet, Take 1 tablet (150 mg total) by mouth every 30 (thirty) days, Disp: 1 tablet, Rfl: 0  •  levocetirizine (XYZAL) 5 MG tablet, Take 1 tablet (5 mg total) by mouth every evening, Disp: 90 tablet, Rfl: 3  •  levothyroxine 50 mcg tablet, TAKE 1 TABLET EVERY DAY, Disp: 90 tablet, Rfl: 3  •  metFORMIN (GLUCOPHAGE) 1000 MG tablet, Take 1 tablet (1,000 mg total) by mouth 2 (two) times a day with meals, Disp: 180 tablet, Rfl: 1  •  methocarbamol (ROBAXIN) 500 mg tablet, Take 500 mg by mouth if needed for muscle spasms, Disp: , Rfl:   •  pioglitazone (ACTOS) 15 mg tablet, Take 1 tablet (15 mg total) by mouth daily, Disp: 90 tablet, Rfl: 1  •  rosuvastatin (CRESTOR) 10 MG tablet, TAKE 1 TABLET AT BEDTIME, Disp: 90 tablet, Rfl: 1  •  TURMERIC PO, Take by mouth, Disp: , Rfl:     ACTIVE PROBLEM LIST:  Patient Active Problem List   Diagnosis   • Basal cell carcinoma of back   • Multiple sclerosis (HCC)   • Hypothyroidism, acquired   • Mixed hyperlipidemia   • Age related osteoporosis   • Type 2 diabetes mellitus without complication, without long-term current use of insulin [facility])       PAST MEDICAL HISTORY:  Past Medical History:   Diagnosis Date   • Allergic Laytex   • Diabetes mellitus ([address]) [id number] mg 2x perday   • Disease of thyroid gland Levothyroxine 50 mg       PAST SURGICAL HISTORY:  Past Surgical History:   Procedure Laterality Date   • MYRINGOTOMY W/ TUBES         FAMILY HISTORY:  Family History   Problem Relation Age of Onset   • Thyroid cancer Mother    • Cancer Mother    • Prostate cancer Father    • Diabetes Maternal Grandmother        SOCIAL HISTORY:  Social History     Socioeconomic History   • Marital status: /Civil Union     Spouse name: Not on file   • Number of children: Not on file   • Years of education: Not on file   • Highest education level: Not on file   Occupational History   • Not on file   Tobacco Use   • Smoking status: Never   • Smokeless tobacco: Never   Vaping Use   • Vaping Use: Never used   Substance and Sexual Activity   • Alcohol use: Never   • Drug use: Never   • Sexual activity: Yes     Partners: Male     Comment: I am 81. I don’t I need birth control   Other Topics Concern   • Not on file   Social History Narrative   • Not on file     Social Determinants of Health     Financial Resource Strain: Low Risk  (3/29/2023)    Overall Financial Resource Strain (CARDIA)    • Difficulty of Paying Living Expenses: Not hard at all   Food Insecurity: Not on file   Transportation Needs: No Transportation Needs (3/29/2023)    PRAPARE - Transportation    • Lack of Transportation (Medical): No    • Lack of Transportation (Non-Medical): No   Physical Activity: Not on file   Stress: Not on file   Social Connections: Not on file   Intimate Partner Violence: Not on file   Housing Stability: Not on file       Review of Systems   Respiratory: Negative for shortness of breath. Cardiovascular: Negative for chest pain. Gastrointestinal: Negative for abdominal pain.          Objective:  Vitals:    09/06/23 1334   BP: 128/82   BP Location: Left arm   Patient Position: Sitting   Cuff Size: Adult   Pulse: 83   Resp: 16   Temp: 97.5 °F (36.4 °C)   TempSrc: Tympanic   SpO2: 98%   Weight: 61.5 kg (135 lb 9.6 oz)   Height: 5' (1.524 m)     Body mass index is 26.48 kg/m². Physical Exam  Vitals and nursing note reviewed. Constitutional:       Appearance: She is well-developed. Cardiovascular:      Rate and Rhythm: Normal rate and regular rhythm. Heart sounds: Normal heart sounds. Pulmonary:      Effort: Pulmonary effort is normal.      Breath sounds: Normal breath sounds. Abdominal:      Palpations: Abdomen is soft. Tenderness: There is no abdominal tenderness. Neurological:      Mental Status: She is alert and oriented to person, place, and time. RESULTS:  Hemoglobin A1C   Date/Time Value Ref Range Status   08/30/2023 09:57 AM 9.0 (H) Normal 4.0-5.6%; PreDiabetic 5.7-6.4%;  Diabetic >=6.5%; Glycemic control for adults with diabetes <7.0% % Final   06/19/2019 09:02 AM 7.3 (H) <5.7 % Final     Comment:     Reference Range  Non-diabetic                     <5.7  Pre-diabetic                     5.7-6.4  Diabetic                         >=6.5  ADA target for diabetic control  <=7     Cholesterol   Date/Time Value Ref Range Status   08/30/2023 09:57  See Comment mg/dL Final     Comment:     Cholesterol:         Pediatric <18 Years        Desirable          <170 mg/dL      Borderline High    170-199 mg/dL      High               >=200 mg/dL        Adult >=18 Years            Desirable        <200 mg/dL      Borderline High  200-239 mg/dL      High             >239 mg/dL       Triglycerides   Date/Time Value Ref Range Status   08/30/2023 09:57  (H) See Comment mg/dL Final     Comment:     Triglyceride:     0-9Y            <75mg/dL     10Y-17Y         <90 mg/dL       >=18Y     Normal          <150 mg/dL     Borderline High 150-199 mg/dL     High            200-499 mg/dL        Very High       >499 mg/dL    Specimen collection should occur prior to Metamizole administration due to the potential for falsely depressed results. HDL, Direct   Date/Time Value Ref Range Status   08/30/2023 09:57 AM 39 (L) >=50 mg/dL Final     LDL Calculated   Date/Time Value Ref Range Status   08/30/2023 09:57 AM 49 0 - 100 mg/dL Final     Comment:     LDL Cholesterol:     Optimal           <100 mg/dl     Near Optimal      100-129 mg/dl     Above Optimal       Borderline High 130-159 mg/dl       High            160-189 mg/dl       Very High       >189 mg/dl         This screening LDL is a calculated result. It does not have the accuracy of the Direct Measured LDL in the monitoring of patients with hyperlipidemia and/or statin therapy. Direct Measure LDL ([id number]) must be ordered separately in these patients. Hemoglobin   Date/Time Value Ref Range Status   08/30/2023 09:57 AM 12.6 11.5 - 15.4 g/dL Final     Hematocrit   Date/Time Value Ref Range Status   08/30/2023 09:57 AM 40.0 34.8 - 46.1 % Final     Platelets   Date/Time Value Ref Range Status   08/30/2023 09:57  149 - 390 Thousands/uL Final     TSH 3RD GENERATON   Date/Time Value Ref Range Status   08/30/2023 09:57 AM 1.763 0.450 - 4.500 uIU/mL Final     Comment:     The recommended reference ranges for TSH during pregnancy are as follows:   First trimester 0.1 to 2.5 uIU/mL   Second trimester  0.2 to 3.0 uIU/mL   Third trimester 0.3 to 3.0 uIU/m    Note: Normal ranges may not apply to patients who are transgender, non-binary, or whose legal sex, sex at birth, and gender identity differ. Adult TSH (3rd generation) reference range follows the recommended guidelines of the American Thyroid Association, January, 2020. Free T4   Date/Time Value Ref Range Status   08/30/2023 09:57 AM 0.90 0.61 - 1.12 ng/dL Final     Comment:     Specimens with biotin concentrations > 10 ng/mL can lead to significant (> 10%) positive bias in result.      Sodium   Date/Time Value Ref Range Status   08/30/2023 09:57  135 - 147 mmol/L Final     BUN   Date/Time Value Ref Range Status   08/30/2023 09:57 AM 11 5 - 25 mg/dL Final     Creatinine   Date/Time Value Ref Range Status   08/30/2023 09:57 AM 0.70 0.60 - 1.30 mg/dL Final     Comment:     Standardized to IDMS reference method      In chart    This note was created with voice recognition software. Phonic, grammatical and spelling errors may be present within the note as a result.

## 2023-09-11 ENCOUNTER — TELEPHONE (OUTPATIENT)
Dept: INTERNAL MEDICINE CLINIC | Facility: CLINIC | Age: 73
End: 2023-09-11

## 2023-09-11 NOTE — TELEPHONE ENCOUNTER
Pt can take the new med ACTOS once daily with her metformin in am correct? And she can still continue to take her 2nd metformin dose in the pm correct?      shes just making sure

## 2023-09-12 DIAGNOSIS — M81.0 AGE-RELATED OSTEOPOROSIS WITHOUT CURRENT PATHOLOGICAL FRACTURE: ICD-10-CM

## 2023-09-12 RX ORDER — IBANDRONATE SODIUM 150 MG/1
150 TABLET, FILM COATED ORAL
Qty: 1 TABLET | Refills: 0 | Status: CANCELLED | OUTPATIENT
Start: 2023-09-12

## 2023-09-13 ENCOUNTER — TELEPHONE (OUTPATIENT)
Dept: INTERNAL MEDICINE CLINIC | Facility: CLINIC | Age: 73
End: 2023-09-13

## 2023-09-13 NOTE — TELEPHONE ENCOUNTER
Spoke to Juaquin, per Dr. Abdiaziz Ribeiro "Yes that is correct   Actos in am along with metformin   Along with pm dose" Bety understood.

## 2023-09-13 NOTE — TELEPHONE ENCOUNTER
Patient is on ciprofloxacin 500 mg, take 2 times daily, for an ear infection. She started this on Monday, Sept 11 and is to take it for 7 days. She would like to know if it is ok to take this with the metformin 1000 MG tablet and [id number] MG tablet? Please advise. ...     Call back #[phone]

## 2023-09-14 ENCOUNTER — TELEPHONE (OUTPATIENT)
Dept: INTERNAL MEDICINE CLINIC | Facility: CLINIC | Age: 73
End: 2023-09-14

## 2023-09-14 DIAGNOSIS — M81.0 AGE-RELATED OSTEOPOROSIS WITHOUT CURRENT PATHOLOGICAL FRACTURE: ICD-10-CM

## 2023-09-14 DIAGNOSIS — M25.512 LEFT SHOULDER PAIN, UNSPECIFIED CHRONICITY: Primary | ICD-10-CM

## 2023-09-14 RX ORDER — METHOCARBAMOL 500 MG/1
500 TABLET, FILM COATED ORAL DAILY PRN
Qty: 90 TABLET | Refills: 1 | Status: SHIPPED | OUTPATIENT
Start: 2023-09-14

## 2023-09-14 RX ORDER — IBANDRONATE SODIUM 150 MG/1
150 TABLET, FILM COATED ORAL
Qty: 1 TABLET | Refills: 0 | Status: SHIPPED | OUTPATIENT
Start: 2023-09-14

## 2023-09-14 NOTE — TELEPHONE ENCOUNTER
Called patient to let her know that the medication was sent to [facility]

## 2023-09-14 NOTE — TELEPHONE ENCOUNTER
Ibandronate 150 MG tablet-take 1 tablet by mouth every 30 days    [address] [place] [address] Mail Delivery    Patient said that she needs this sent in immediately due to she needs it for 9/28.     Please notify patient as soon as this gets sent in!!

## 2023-10-02 ENCOUNTER — TELEPHONE (OUTPATIENT)
Age: 73
End: 2023-10-02

## 2023-10-11 ENCOUNTER — APPOINTMENT (OUTPATIENT)
Dept: LAB | Facility: CLINIC | Age: 73
End: 2023-10-11
Payer: COMMERCIAL

## 2023-10-11 DIAGNOSIS — E11.9 TYPE 2 DIABETES MELLITUS WITHOUT COMPLICATION, WITHOUT LONG-TERM CURRENT USE OF INSULIN (HCC): ICD-10-CM

## 2023-10-11 LAB
ALBUMIN SERPL BCP-MCNC: 4.5 G/DL (ref 3.5–5)
ALP SERPL-CCNC: 75 U/L (ref 34–104)
ALT SERPL W P-5'-P-CCNC: 44 U/L (ref 7–52)
ANION GAP SERPL CALCULATED.3IONS-SCNC: 8 MMOL/L
AST SERPL W P-5'-P-CCNC: 35 U/L (ref 13–39)
BILIRUB SERPL-MCNC: 0.32 MG/DL (ref 0.2–1)
BUN SERPL-MCNC: 13 MG/DL (ref 5–25)
CALCIUM SERPL-MCNC: 9.4 MG/DL (ref 8.4–10.2)
CHLORIDE SERPL-SCNC: 104 MMOL/L (ref 96–108)
CO2 SERPL-SCNC: 26 MMOL/L (ref 21–32)
CREAT SERPL-MCNC: 0.69 MG/DL (ref 0.6–1.3)
CREAT UR-MCNC: 69.2 MG/DL
EST. AVERAGE GLUCOSE BLD GHB EST-MCNC: 206 MG/DL
GFR SERPL CREATININE-BSD FRML MDRD: 88 ML/MIN/1.73SQ M
GLUCOSE P FAST SERPL-MCNC: 153 MG/DL (ref 65–99)
HBA1C MFR BLD: 8.8 %
MICROALBUMIN UR-MCNC: 29.7 MG/L
MICROALBUMIN/CREAT 24H UR: 43 MG/G CREATININE (ref 0–30)
POTASSIUM SERPL-SCNC: 4.5 MMOL/L (ref 3.5–5.3)
PROT SERPL-MCNC: 7.4 G/DL (ref 6.4–8.4)
SODIUM SERPL-SCNC: 138 MMOL/L (ref 135–147)

## 2023-10-11 PROCEDURE — 82043 UR ALBUMIN QUANTITATIVE: CPT

## 2023-10-11 PROCEDURE — 80053 COMPREHEN METABOLIC PANEL: CPT

## 2023-10-11 PROCEDURE — 82570 ASSAY OF URINE CREATININE: CPT

## 2023-10-11 PROCEDURE — 36415 COLL VENOUS BLD VENIPUNCTURE: CPT

## 2023-10-11 PROCEDURE — 83036 HEMOGLOBIN GLYCOSYLATED A1C: CPT

## 2023-10-12 DIAGNOSIS — E03.9 HYPOTHYROIDISM, ACQUIRED: ICD-10-CM

## 2023-10-12 DIAGNOSIS — E11.9 TYPE 2 DIABETES MELLITUS WITHOUT COMPLICATION, WITHOUT LONG-TERM CURRENT USE OF INSULIN (HCC): Primary | ICD-10-CM

## 2023-11-21 DIAGNOSIS — E03.9 HYPOTHYROIDISM, ACQUIRED: ICD-10-CM

## 2023-11-21 DIAGNOSIS — M81.0 AGE-RELATED OSTEOPOROSIS WITHOUT CURRENT PATHOLOGICAL FRACTURE: ICD-10-CM

## 2023-11-21 RX ORDER — LEVOTHYROXINE SODIUM 0.05 MG/1
TABLET ORAL
Qty: 90 TABLET | Refills: 3 | Status: SHIPPED | OUTPATIENT
Start: 2023-11-21

## 2023-11-21 RX ORDER — IBANDRONATE SODIUM 150 MG/1
TABLET, FILM COATED ORAL
Qty: 3 TABLET | Refills: 10 | Status: SHIPPED | OUTPATIENT
Start: 2023-11-21

## 2023-12-08 ENCOUNTER — VBI (OUTPATIENT)
Dept: ADMINISTRATIVE | Facility: OTHER | Age: 73
End: 2023-12-08

## 2024-01-04 ENCOUNTER — RA CDI HCC (OUTPATIENT)
Dept: OTHER | Facility: HOSPITAL | Age: 74
End: 2024-01-04

## 2024-01-04 NOTE — PROGRESS NOTES
HCC coding opportunities          Chart Reviewed number of suggestions sent to Provider: 1  E11.65     Patients Insurance     Medicare Insurance: [employer]

## 2024-01-10 ENCOUNTER — APPOINTMENT (OUTPATIENT)
Dept: LAB | Facility: CLINIC | Age: 74
End: 2024-01-10
Payer: COMMERCIAL

## 2024-01-10 DIAGNOSIS — E03.9 HYPOTHYROIDISM, ACQUIRED: ICD-10-CM

## 2024-01-10 DIAGNOSIS — E11.9 TYPE 2 DIABETES MELLITUS WITHOUT COMPLICATION, WITHOUT LONG-TERM CURRENT USE OF INSULIN (HCC): ICD-10-CM

## 2024-01-10 LAB
ALBUMIN SERPL BCP-MCNC: 4.6 G/DL (ref 3.5–5)
ALP SERPL-CCNC: 82 U/L (ref 34–104)
ALT SERPL W P-5'-P-CCNC: 31 U/L (ref 7–52)
ANION GAP SERPL CALCULATED.3IONS-SCNC: 8 MMOL/L
AST SERPL W P-5'-P-CCNC: 25 U/L (ref 13–39)
BASOPHILS # BLD AUTO: 0.06 THOUSANDS/ÂΜL (ref 0–0.1)
BASOPHILS NFR BLD AUTO: 1 % (ref 0–1)
BILIRUB SERPL-MCNC: 0.49 MG/DL (ref 0.2–1)
BUN SERPL-MCNC: 14 MG/DL (ref 5–25)
CALCIUM SERPL-MCNC: 10 MG/DL (ref 8.4–10.2)
CHLORIDE SERPL-SCNC: 103 MMOL/L (ref 96–108)
CHOLEST SERPL-MCNC: 143 MG/DL
CO2 SERPL-SCNC: 26 MMOL/L (ref 21–32)
CREAT SERPL-MCNC: 0.71 MG/DL (ref 0.6–1.3)
EOSINOPHIL # BLD AUTO: 0.31 THOUSAND/ÂΜL (ref 0–0.61)
EOSINOPHIL NFR BLD AUTO: 4 % (ref 0–6)
ERYTHROCYTE [DISTWIDTH] IN BLOOD BY AUTOMATED COUNT: 13.6 % (ref 11.6–15.1)
GFR SERPL CREATININE-BSD FRML MDRD: 85 ML/MIN/1.73SQ M
GLUCOSE P FAST SERPL-MCNC: 186 MG/DL (ref 65–99)
HCT VFR BLD AUTO: 40 % (ref 34.8–46.1)
HDLC SERPL-MCNC: 47 MG/DL
HGB BLD-MCNC: 13 G/DL (ref 11.5–15.4)
IMM GRANULOCYTES # BLD AUTO: 0.03 THOUSAND/UL (ref 0–0.2)
IMM GRANULOCYTES NFR BLD AUTO: 0 % (ref 0–2)
LDLC SERPL CALC-MCNC: 62 MG/DL (ref 0–100)
LYMPHOCYTES # BLD AUTO: 2.14 THOUSANDS/ÂΜL (ref 0.6–4.47)
LYMPHOCYTES NFR BLD AUTO: 26 % (ref 14–44)
MCH RBC QN AUTO: 28.7 PG (ref 26.8–34.3)
MCHC RBC AUTO-ENTMCNC: 32.5 G/DL (ref 31.4–37.4)
MCV RBC AUTO: 88 FL (ref 82–98)
MONOCYTES # BLD AUTO: 0.54 THOUSAND/ÂΜL (ref 0.17–1.22)
MONOCYTES NFR BLD AUTO: 7 % (ref 4–12)
NEUTROPHILS # BLD AUTO: 5.29 THOUSANDS/ÂΜL (ref 1.85–7.62)
NEUTS SEG NFR BLD AUTO: 62 % (ref 43–75)
NONHDLC SERPL-MCNC: 96 MG/DL
NRBC BLD AUTO-RTO: 0 /100 WBCS
PLATELET # BLD AUTO: 225 THOUSANDS/UL (ref 149–390)
PMV BLD AUTO: 12.1 FL (ref 8.9–12.7)
POTASSIUM SERPL-SCNC: 4.4 MMOL/L (ref 3.5–5.3)
PROT SERPL-MCNC: 7.6 G/DL (ref 6.4–8.4)
RBC # BLD AUTO: 4.53 MILLION/UL (ref 3.81–5.12)
SODIUM SERPL-SCNC: 137 MMOL/L (ref 135–147)
T4 FREE SERPL-MCNC: 0.89 NG/DL (ref 0.61–1.12)
TRIGL SERPL-MCNC: 171 MG/DL
TSH SERPL DL<=0.05 MIU/L-ACNC: 1.35 UIU/ML (ref 0.45–4.5)
WBC # BLD AUTO: 8.37 THOUSAND/UL (ref 4.31–10.16)

## 2024-01-10 PROCEDURE — 85025 COMPLETE CBC W/AUTO DIFF WBC: CPT

## 2024-01-10 PROCEDURE — 83036 HEMOGLOBIN GLYCOSYLATED A1C: CPT

## 2024-01-10 PROCEDURE — 36415 COLL VENOUS BLD VENIPUNCTURE: CPT

## 2024-01-10 PROCEDURE — 80061 LIPID PANEL: CPT

## 2024-01-10 PROCEDURE — 84439 ASSAY OF FREE THYROXINE: CPT

## 2024-01-10 PROCEDURE — 80053 COMPREHEN METABOLIC PANEL: CPT

## 2024-01-10 PROCEDURE — 84443 ASSAY THYROID STIM HORMONE: CPT

## 2024-01-11 LAB
EST. AVERAGE GLUCOSE BLD GHB EST-MCNC: 186 MG/DL
HBA1C MFR BLD: 8.1 %

## 2024-01-26 ENCOUNTER — OFFICE VISIT (OUTPATIENT)
Dept: INTERNAL MEDICINE CLINIC | Facility: CLINIC | Age: 74
End: 2024-01-26
Payer: COMMERCIAL

## 2024-01-26 VITALS
BODY MASS INDEX: 28.58 KG/M2 | HEART RATE: 82 BPM | DIASTOLIC BLOOD PRESSURE: 80 MMHG | RESPIRATION RATE: 18 BRPM | SYSTOLIC BLOOD PRESSURE: 138 MMHG | OXYGEN SATURATION: 99 % | TEMPERATURE: 98.4 F | HEIGHT: 60 IN | WEIGHT: 145.6 LBS

## 2024-01-26 DIAGNOSIS — E78.2 MIXED HYPERLIPIDEMIA: ICD-10-CM

## 2024-01-26 DIAGNOSIS — G35 MULTIPLE SCLEROSIS (HCC): ICD-10-CM

## 2024-01-26 DIAGNOSIS — E11.9 TYPE 2 DIABETES MELLITUS WITHOUT COMPLICATION, WITHOUT LONG-TERM CURRENT USE OF INSULIN (HCC): Primary | ICD-10-CM

## 2024-01-26 DIAGNOSIS — E03.9 HYPOTHYROIDISM, ACQUIRED: ICD-10-CM

## 2024-01-26 DIAGNOSIS — M25.532 LEFT WRIST PAIN: ICD-10-CM

## 2024-01-26 PROCEDURE — 99214 OFFICE O/P EST MOD 30 MIN: CPT | Performed by: INTERNAL MEDICINE

## 2024-01-26 NOTE — PROGRESS NOTES
Assessment/Plan:     Improving control of her diabetes but limited by the medication choices.  Continue the metformin 1000 mg twice a day and Actos 15 mg daily.  Keep working on the diet.  Double check with her formulary since it is the new year to see if something like Farxiga or Jardiance is covered.  For her hypothyroidism, continue her levothyroxine 50 mcg daily.  For her hyperlipidemia, controlled with her Crestor 10 mg daily.  For her MS, she still does not want to follow with any specialists or go on medicine.  Still appears to have a mild case.  Gave her referral to orthopedics when she is ready to go for her wrist.  Continue all other medications.    Continue followup with all specialists.   Continue diet and exercise.    Ordered labs for next visit.     Quality Measures:       Return in about 4 months (around 5/26/2024) for Regular visit and Medicare Wellness.    No problem-specific Assessment & Plan notes found for this encounter.       Diagnoses and all orders for this visit:    Type 2 diabetes mellitus without complication, without long-term current use of insulin (HCC)  -     Hemoglobin A1C; Future  -     Comprehensive metabolic panel; Future  -     CBC and differential; Future  -     Lipid Panel with Direct LDL reflex; Future    Mixed hyperlipidemia    Hypothyroidism, acquired  -     T4, free; Future  -     TSH, 3rd generation; Future    Left wrist pain  -     Ambulatory referral to Orthopedic Surgery; Future    Multiple sclerosis (HCC)          Subjective:      Patient ID: Bety Engle is a 73 y.o. female.    Patient comes in today for routine follow-up.  She states she is doing okay.  Her sugars come down but still not all the way.  She is not taking the Actos every day because it is causing some weight gain.  But that was the only medicine that was covered by her insurance that was cost effective for her.  Her blood pressure is controlled.  Her thyroid levels are controlled.  Cholesterol is  controlled.  Her MS is still not bothering her.  Taking all of her medicines as directed.  Denies any new complaints today but she does have an ongoing issue with her left wrist.  She would like referral to orthopedics but reports she is going to wait because they have some bills to pay.        ALLERGIES:  Allergies   Allergen Reactions   • Bee Venom Shortness Of Breath and Swelling   • Latex Rash       CURRENT MEDICATIONS:    Current Outpatient Medications:   •  Accu-Chek Guide test strip, TEST BLOOD SUGAR ONE TIME DAILY AS DIRECTED, Disp: 100 strip, Rfl: 1  •  Accu-Chek Softclix Lancets lancets, TEST BLOOD SUGAR ONE TIME DAILY AS DIRECTED, Disp: 100 each, Rfl: 1  •  Blood Glucose Monitoring Suppl (Accu-Chek Marsha Plus) w/Device KIT, USE AS DIRECTED, Disp: 1 kit, Rfl: 1  •  busPIRone (BUSPAR) 10 mg tablet, Take 1 tablet (10 mg total) by mouth 2 (two) times a day as needed (anxiety), Disp: 180 tablet, Rfl: 3  •  cholecalciferol (VITAMIN D3) 1,000 units tablet, Take 1,000 Units by mouth daily, Disp: , Rfl:   •  fluticasone (FLONASE) 50 mcg/act nasal spray, , Disp: , Rfl:   •  ibandronate (BONIVA) 150 MG tablet, TAKE 1 TABLET EVERY 30 DAYS AS DIRECTED, Disp: 3 tablet, Rfl: 10  •  levocetirizine (XYZAL) 5 MG tablet, Take 1 tablet (5 mg total) by mouth every evening, Disp: 90 tablet, Rfl: 3  •  levothyroxine 50 mcg tablet, TAKE 1 TABLET EVERY DAY, Disp: 90 tablet, Rfl: 3  •  metFORMIN (GLUCOPHAGE) 1000 MG tablet, TAKE 1 TABLET TWICE DAILY WITH MEALS, Disp: 180 tablet, Rfl: 1  •  methocarbamol (ROBAXIN) 500 mg tablet, Take 1 tablet (500 mg total) by mouth daily as needed for muscle spasms, Disp: 90 tablet, Rfl: 1  •  pioglitazone (ACTOS) 15 mg tablet, Take 1 tablet (15 mg total) by mouth daily, Disp: 90 tablet, Rfl: 1  •  rosuvastatin (CRESTOR) 10 MG tablet, TAKE 1 TABLET AT BEDTIME, Disp: 90 tablet, Rfl: 1  •  TURMERIC PO, Take by mouth, Disp: , Rfl:     ACTIVE PROBLEM LIST:  Patient Active Problem List   Diagnosis   •  Basal cell carcinoma of back   • Multiple sclerosis (HCC)   • Hypothyroidism, acquired   • Mixed hyperlipidemia   • Age related osteoporosis   • Type 2 diabetes mellitus without complication, without long-term current use of insulin (HCC)       PAST MEDICAL HISTORY:  Past Medical History:   Diagnosis Date   • Allergic Laytex   • Diabetes mellitus (HCC) [id number] mg 2x perday   • Disease of thyroid gland Levothyroxine 50 mg       PAST SURGICAL HISTORY:  Past Surgical History:   Procedure Laterality Date   • MYRINGOTOMY W/ TUBES         FAMILY HISTORY:  Family History   Problem Relation Age of Onset   • Thyroid cancer Mother    • Cancer Mother    • Prostate cancer Father    • Diabetes Maternal Grandmother        SOCIAL HISTORY:  Social History     Socioeconomic History   • Marital status: /Civil Union     Spouse name: Not on file   • Number of children: Not on file   • Years of education: Not on file   • Highest education level: Not on file   Occupational History   • Not on file   Tobacco Use   • Smoking status: Never   • Smokeless tobacco: Never   Vaping Use   • Vaping status: Never Used   Substance and Sexual Activity   • Alcohol use: Never   • Drug use: Never   • Sexual activity: Yes     Partners: Male     Comment: I am 72. I don’t I need birth control   Other Topics Concern   • Not on file   Social History Narrative   • Not on file     Social Determinants of Health     Financial Resource Strain: Low Risk  (3/29/2023)    Overall Financial Resource Strain (CARDIA)    • Difficulty of Paying Living Expenses: Not hard at all   Food Insecurity: Not on file   Transportation Needs: No Transportation Needs (3/29/2023)    PRAPARE - Transportation    • Lack of Transportation (Medical): No    • Lack of Transportation (Non-Medical): No   Physical Activity: Not on file   Stress: Not on file   Social Connections: Not on file   Intimate Partner Violence: Not on file   Housing Stability: Not on file       Review of  Systems   Respiratory:  Negative for shortness of breath.    Cardiovascular:  Negative for chest pain.   Gastrointestinal:  Negative for abdominal pain.         Objective:  Vitals:    01/26/24 1510   BP: 138/80   BP Location: Left arm   Patient Position: Sitting   Cuff Size: Standard   Pulse: 82   Resp: 18   Temp: 98.4 °F (36.9 °C)   TempSrc: Tympanic   SpO2: 99%   Weight: 66 kg (145 lb 9.6 oz)   Height: 5' (1.524 m)     Body mass index is 28.44 kg/m².     Physical Exam  Vitals and nursing note reviewed.   Constitutional:       Appearance: Normal appearance. She is well-developed.   Cardiovascular:      Rate and Rhythm: Normal rate and regular rhythm.      Heart sounds: Normal heart sounds.   Pulmonary:      Effort: Pulmonary effort is normal.      Breath sounds: Normal breath sounds.   Abdominal:      Palpations: Abdomen is soft.      Tenderness: There is no abdominal tenderness.   Neurological:      Mental Status: She is alert and oriented to person, place, and time.   Psychiatric:         Mood and Affect: Mood normal.         Behavior: Behavior normal.           RESULTS:  Hemoglobin A1C   Date/Time Value Ref Range Status   01/10/2024 09:52 AM 8.1 (H) Normal 4.0-5.6%; PreDiabetic 5.7-6.4%; Diabetic >=6.5%; Glycemic control for adults with diabetes <7.0% % Final   06/19/2019 09:02 AM 7.3 (H) <5.7 % Final     Comment:     Reference Range  Non-diabetic                     <5.7  Pre-diabetic                     5.7-6.4  Diabetic                         >=6.5  ADA target for diabetic control  <=7     Cholesterol   Date/Time Value Ref Range Status   01/10/2024 09:52  See Comment mg/dL Final     Comment:     Cholesterol:         Pediatric <18 Years        Desirable          <170 mg/dL      Borderline High    170-199 mg/dL      High               >=200 mg/dL        Adult >=18 Years            Desirable        <200 mg/dL      Borderline High  200-239 mg/dL      High             >239 mg/dL       Triglycerides    Date/Time Value Ref Range Status   01/10/2024 09:52  (H) See Comment mg/dL Final     Comment:     Triglyceride:     0-9Y            <75mg/dL     10Y-17Y         <90 mg/dL       >=18Y     Normal          <150 mg/dL     Borderline High 150-199 mg/dL     High            200-499 mg/dL        Very High       >499 mg/dL    Specimen collection should occur prior to Metamizole administration due to the potential for falsely depressed results.     HDL, Direct   Date/Time Value Ref Range Status   01/10/2024 09:52 AM 47 (L) >=50 mg/dL Final     LDL Calculated   Date/Time Value Ref Range Status   01/10/2024 09:52 AM 62 0 - 100 mg/dL Final     Comment:     LDL Cholesterol:     Optimal           <100 mg/dl     Near Optimal      100-129 mg/dl     Above Optimal       Borderline High 130-159 mg/dl       High            160-189 mg/dl       Very High       >189 mg/dl         This screening LDL is a calculated result.   It does not have the accuracy of the Direct Measured LDL in the monitoring of patients with hyperlipidemia and/or statin therapy.   Direct Measure LDL ([id number]) must be ordered separately in these patients.     Hemoglobin   Date/Time Value Ref Range Status   01/10/2024 09:52 AM 13.0 11.5 - 15.4 g/dL Final     Hematocrit   Date/Time Value Ref Range Status   01/10/2024 09:52 AM 40.0 34.8 - 46.1 % Final     Platelets   Date/Time Value Ref Range Status   01/10/2024 09:52  149 - 390 Thousands/uL Final     TSH 3RD GENERATON   Date/Time Value Ref Range Status   01/10/2024 09:52 AM 1.352 0.450 - 4.500 uIU/mL Final     Comment:     The recommended reference ranges for TSH during pregnancy are as follows:   First trimester 0.100 to 2.500 uIU/mL   Second trimester  0.200 to 3.000 uIU/mL   Third trimester 0.300 to 3.000 uIU/m    Note: Normal ranges may not apply to patients who are transgender, non-binary, or whose legal sex, sex at birth, and gender identity differ.  Adult TSH (3rd generation) reference range follows  the recommended guidelines of the American Thyroid Association, January, 2020.     Free T4   Date/Time Value Ref Range Status   01/10/2024 09:52 AM 0.89 0.61 - 1.12 ng/dL Final     Comment:     Specimens with biotin concentrations > 10 ng/mL can lead to significant (> 10%) positive bias in result.     Sodium   Date/Time Value Ref Range Status   01/10/2024 09:52  135 - 147 mmol/L Final     BUN   Date/Time Value Ref Range Status   01/10/2024 09:52 AM 14 5 - 25 mg/dL Final     Creatinine   Date/Time Value Ref Range Status   01/10/2024 09:52 AM 0.71 0.60 - 1.30 mg/dL Final     Comment:     Standardized to IDMS reference method      In chart    This note was created with voice recognition software.  Phonic, grammatical and spelling errors may be present within the note as a result.

## 2024-01-28 DIAGNOSIS — E78.2 MIXED HYPERLIPIDEMIA: ICD-10-CM

## 2024-01-29 RX ORDER — ROSUVASTATIN CALCIUM 10 MG/1
TABLET, COATED ORAL
Qty: 90 TABLET | Refills: 3 | Status: SHIPPED | OUTPATIENT
Start: 2024-01-29

## 2024-03-11 DIAGNOSIS — M25.512 LEFT SHOULDER PAIN, UNSPECIFIED CHRONICITY: ICD-10-CM

## 2024-03-11 RX ORDER — METHOCARBAMOL 500 MG/1
500 TABLET, FILM COATED ORAL DAILY PRN
Qty: 90 TABLET | Refills: 1 | Status: SHIPPED | OUTPATIENT
Start: 2024-03-11

## 2024-06-09 DIAGNOSIS — E11.9 TYPE 2 DIABETES MELLITUS WITHOUT COMPLICATION, WITHOUT LONG-TERM CURRENT USE OF INSULIN (HCC): ICD-10-CM

## 2024-07-25 LAB
LEFT EYE DIABETIC RETINOPATHY: NORMAL
RIGHT EYE DIABETIC RETINOPATHY: NORMAL

## 2024-08-06 ENCOUNTER — APPOINTMENT (OUTPATIENT)
Dept: LAB | Facility: CLINIC | Age: 74
End: 2024-08-06
Payer: COMMERCIAL

## 2024-08-06 DIAGNOSIS — E03.9 HYPOTHYROIDISM, ACQUIRED: ICD-10-CM

## 2024-08-06 DIAGNOSIS — E11.9 TYPE 2 DIABETES MELLITUS WITHOUT COMPLICATION, WITHOUT LONG-TERM CURRENT USE OF INSULIN (HCC): ICD-10-CM

## 2024-08-06 LAB
ALBUMIN SERPL BCG-MCNC: 4.4 G/DL (ref 3.5–5)
ALP SERPL-CCNC: 76 U/L (ref 34–104)
ALT SERPL W P-5'-P-CCNC: 51 U/L (ref 7–52)
ANION GAP SERPL CALCULATED.3IONS-SCNC: 10 MMOL/L (ref 4–13)
AST SERPL W P-5'-P-CCNC: 41 U/L (ref 13–39)
BASOPHILS # BLD AUTO: 0.05 THOUSANDS/ÂΜL (ref 0–0.1)
BASOPHILS NFR BLD AUTO: 1 % (ref 0–1)
BILIRUB SERPL-MCNC: 0.49 MG/DL (ref 0.2–1)
BUN SERPL-MCNC: 13 MG/DL (ref 5–25)
CALCIUM SERPL-MCNC: 9.6 MG/DL (ref 8.4–10.2)
CHLORIDE SERPL-SCNC: 97 MMOL/L (ref 96–108)
CHOLEST SERPL-MCNC: 146 MG/DL
CO2 SERPL-SCNC: 27 MMOL/L (ref 21–32)
CREAT SERPL-MCNC: 0.7 MG/DL (ref 0.6–1.3)
EOSINOPHIL # BLD AUTO: 0.4 THOUSAND/ÂΜL (ref 0–0.61)
EOSINOPHIL NFR BLD AUTO: 5 % (ref 0–6)
ERYTHROCYTE [DISTWIDTH] IN BLOOD BY AUTOMATED COUNT: 13 % (ref 11.6–15.1)
EST. AVERAGE GLUCOSE BLD GHB EST-MCNC: 223 MG/DL
GFR SERPL CREATININE-BSD FRML MDRD: 87 ML/MIN/1.73SQ M
GLUCOSE P FAST SERPL-MCNC: 252 MG/DL (ref 65–99)
HBA1C MFR BLD: 9.4 %
HCT VFR BLD AUTO: 38.3 % (ref 34.8–46.1)
HDLC SERPL-MCNC: 40 MG/DL
HGB BLD-MCNC: 12.4 G/DL (ref 11.5–15.4)
IMM GRANULOCYTES # BLD AUTO: 0.03 THOUSAND/UL (ref 0–0.2)
IMM GRANULOCYTES NFR BLD AUTO: 0 % (ref 0–2)
LDLC SERPL CALC-MCNC: 38 MG/DL (ref 0–100)
LYMPHOCYTES # BLD AUTO: 2.44 THOUSANDS/ÂΜL (ref 0.6–4.47)
LYMPHOCYTES NFR BLD AUTO: 31 % (ref 14–44)
MCH RBC QN AUTO: 28.6 PG (ref 26.8–34.3)
MCHC RBC AUTO-ENTMCNC: 32.4 G/DL (ref 31.4–37.4)
MCV RBC AUTO: 88 FL (ref 82–98)
MONOCYTES # BLD AUTO: 0.59 THOUSAND/ÂΜL (ref 0.17–1.22)
MONOCYTES NFR BLD AUTO: 7 % (ref 4–12)
NEUTROPHILS # BLD AUTO: 4.49 THOUSANDS/ÂΜL (ref 1.85–7.62)
NEUTS SEG NFR BLD AUTO: 56 % (ref 43–75)
NRBC BLD AUTO-RTO: 0 /100 WBCS
PLATELET # BLD AUTO: 248 THOUSANDS/UL (ref 149–390)
PMV BLD AUTO: 12.2 FL (ref 8.9–12.7)
POTASSIUM SERPL-SCNC: 3.7 MMOL/L (ref 3.5–5.3)
PROT SERPL-MCNC: 7.4 G/DL (ref 6.4–8.4)
RBC # BLD AUTO: 4.34 MILLION/UL (ref 3.81–5.12)
SODIUM SERPL-SCNC: 134 MMOL/L (ref 135–147)
T4 FREE SERPL-MCNC: 0.86 NG/DL (ref 0.61–1.12)
TRIGL SERPL-MCNC: 341 MG/DL
TSH SERPL DL<=0.05 MIU/L-ACNC: 1.88 UIU/ML (ref 0.45–4.5)
WBC # BLD AUTO: 8 THOUSAND/UL (ref 4.31–10.16)

## 2024-08-06 PROCEDURE — 80053 COMPREHEN METABOLIC PANEL: CPT

## 2024-08-06 PROCEDURE — 80061 LIPID PANEL: CPT

## 2024-08-06 PROCEDURE — 84443 ASSAY THYROID STIM HORMONE: CPT

## 2024-08-06 PROCEDURE — 83036 HEMOGLOBIN GLYCOSYLATED A1C: CPT

## 2024-08-06 PROCEDURE — 84439 ASSAY OF FREE THYROXINE: CPT

## 2024-08-06 PROCEDURE — 36415 COLL VENOUS BLD VENIPUNCTURE: CPT

## 2024-08-06 PROCEDURE — 85025 COMPLETE CBC W/AUTO DIFF WBC: CPT

## 2024-10-09 ENCOUNTER — OFFICE VISIT (OUTPATIENT)
Dept: INTERNAL MEDICINE CLINIC | Facility: CLINIC | Age: 74
End: 2024-10-09
Payer: COMMERCIAL

## 2024-10-09 VITALS
OXYGEN SATURATION: 98 % | HEART RATE: 79 BPM | WEIGHT: 135.8 LBS | DIASTOLIC BLOOD PRESSURE: 82 MMHG | RESPIRATION RATE: 18 BRPM | SYSTOLIC BLOOD PRESSURE: 142 MMHG | HEIGHT: 60 IN | BODY MASS INDEX: 26.66 KG/M2

## 2024-10-09 DIAGNOSIS — Z23 NEED FOR VACCINATION: ICD-10-CM

## 2024-10-09 DIAGNOSIS — H66.90 CHRONIC OTITIS MEDIA, UNSPECIFIED OTITIS MEDIA TYPE: ICD-10-CM

## 2024-10-09 DIAGNOSIS — E11.65 TYPE 2 DIABETES MELLITUS WITH HYPERGLYCEMIA, WITHOUT LONG-TERM CURRENT USE OF INSULIN (HCC): ICD-10-CM

## 2024-10-09 DIAGNOSIS — Z00.00 MEDICARE ANNUAL WELLNESS VISIT, SUBSEQUENT: Primary | ICD-10-CM

## 2024-10-09 DIAGNOSIS — E03.9 HYPOTHYROIDISM, ACQUIRED: ICD-10-CM

## 2024-10-09 PROCEDURE — G0008 ADMIN INFLUENZA VIRUS VAC: HCPCS | Performed by: INTERNAL MEDICINE

## 2024-10-09 PROCEDURE — 90662 IIV NO PRSV INCREASED AG IM: CPT | Performed by: INTERNAL MEDICINE

## 2024-10-09 PROCEDURE — G0439 PPPS, SUBSEQ VISIT: HCPCS | Performed by: INTERNAL MEDICINE

## 2024-10-09 PROCEDURE — 99214 OFFICE O/P EST MOD 30 MIN: CPT | Performed by: INTERNAL MEDICINE

## 2024-10-09 NOTE — ASSESSMENT & PLAN NOTE
Not controlled.  She is reluctant to start more medicine.  She feels a lot of it was diet.  Farxiga/Jardiance is too expensive for her.  She does not want to go on insulin.  Lab Results   Component Value Date    HGBA1C 9.4 (H) 08/06/2024       Orders:    Albumin / creatinine urine ratio; Future    Comprehensive metabolic panel; Future    Hemoglobin A1C; Future    CBC and differential; Future    Lipid Panel with Direct LDL reflex; Future

## 2024-10-09 NOTE — PROGRESS NOTES
Patient's shoes and socks removed.    Right Foot/Ankle   Right Foot Inspection  Skin Exam: skin normal, skin intact, callus and callus. No dry skin, no warmth, no erythema, no maceration, no abnormal color, no pre-ulcer and no ulcer.     Toe Exam: ROM and strength within normal limits.     Sensory   Monofilament testing: intact    Vascular  The right DP pulse is 2+.     Right Toe  - Comprehensive Exam  Ecchymosis: none  Swelling: none       Left Foot/Ankle  Left Foot Inspection  Skin Exam: skin normal, skin intact and callus. No warmth, no erythema, no maceration, normal color, no pre-ulcer and no ulcer.     Toe Exam: ROM and strength within normal limits.     Sensory   Monofilament testing: intact    Vascular  The left DP pulse is 2+.     Left Toe  - Comprehensive Exam  Ecchymosis: none  Swelling: none       Assign Risk Category  No deformity present  No loss of protective sensation  No weak pulses  Risk: 0

## 2024-10-09 NOTE — PROGRESS NOTES
Ambulatory Visit  Name: Bety Engle      : 1950      MRN: [id number]  Encounter Provider: Abdiaziz Wall MD  Encounter Date: 10/9/2024   Encounter department: [facility] INTERNAL MEDICINE [place]    Assessment & Plan  Medicare annual wellness visit, subsequent         Type 2 diabetes mellitus with hyperglycemia, without long-term current use of insulin (HCC)  Not controlled.  She is reluctant to start more medicine.  She feels a lot of it was diet.  Farxiga/Jardiance is too expensive for her.  She does not want to go on insulin.  Lab Results   Component Value Date    HGBA1C 9.4 (H) 2024       Orders:    Albumin / creatinine urine ratio; Future    Comprehensive metabolic panel; Future    Hemoglobin A1C; Future    CBC and differential; Future    Lipid Panel with Direct LDL reflex; Future    Hypothyroidism, acquired  Controlled.  Orders:    T4, free; Future    TSH, 3rd generation; Future    Chronic otitis media, unspecified otitis media type  Continue follow-up with ENT.       Need for vaccination    Orders:    Fluzone High-Dose 0.5 mL IM       Preventive health issues were discussed with patient, and age appropriate screening tests were ordered as noted in patient's After Visit Summary. Personalized health advice and appropriate referrals for health education or preventive services given if needed, as noted in patient's After Visit Summary.    History of Present Illness     Patient comes in today for routine follow-up and Medicare wellness.  Sugars are still running high but since her last visit she had COVID and has recurring issues with her right ear so she has been dealing with infections.  She knows that some of her sugars from her diet.  She is reluctant to start any more medicine.  She is going to need surgery for her ear.  She states they are aware of her sugars being high but still will proceed.  The ear problem is probably part of the sugars.  Her thyroid levels are controlled.  Taking  her other medications as directed.  Denies any other new complaints today.  No further additions to her history.       Patient Care Team:  Abdiaziz Wall MD as PCP - General (Internal Medicine)  Cole Dodd MD    Review of Systems   Respiratory:  Negative for shortness of breath.    Cardiovascular:  Negative for chest pain.   Gastrointestinal:  Negative for abdominal pain.     Medical History Reviewed by provider this encounter:       Annual Wellness Visit Questionnaire   Bety is here for her Subsequent Wellness visit.     Health Risk Assessment:   Patient rates overall health as good. Patient feels that their physical health rating is same. Patient is satisfied with their life. Eyesight was rated as slightly worse. Hearing was rated as slightly worse. Patient feels that their emotional and mental health rating is much better. Patients states they are sometimes angry. Patient states they are often unusually tired/fatigued. Pain experienced in the last 7 days has been none. Patient states that she has experienced no weight loss or gain in last 6 months.     Depression Screening:   PHQ-2 Score: 0      Fall Risk Screening:   In the past year, patient has experienced: no history of falling in past year      Urinary Incontinence Screening:   Patient has not leaked urine accidently in the last six months.     Home Safety:  Patient does not have trouble with stairs inside or outside of their home. Patient has working smoke alarms and has working carbon monoxide detector. Home safety hazards include: none.     Nutrition:   Current diet is Regular.     Medications:   Patient is currently taking over-the-counter supplements. OTC medications include: see medication list. Patient is able to manage medications.     Activities of Daily Living (ADLs)/Instrumental Activities of Daily Living (IADLs):   Walk and transfer into and out of bed and chair?: Yes  Dress and groom yourself?: Yes    Bathe or shower yourself?: Yes    Feed  yourself? Yes  Do your laundry/housekeeping?: Yes  Manage your money, pay your bills and track your expenses?: Yes  Make your own meals?: Yes    Do your own shopping?: Yes    Previous Hospitalizations:   Any hospitalizations or ED visits within the last 12 months?: Yes    How many hospitalizations have you had in the last year?: 1-2    Advance Care Planning:   Living will: Yes    Advanced directive: Yes    Advanced directive counseling given: Yes      Cognitive Screening:   Provider or family/friend/caregiver concerned regarding cognition?: No    PREVENTIVE SCREENINGS      Cardiovascular Screening:    General: Screening Not Indicated and History Lipid Disorder      Diabetes Screening:     General: Screening Not Indicated and History Diabetes      Colorectal Cancer Screening:     General: Screening Current      Breast Cancer Screening:     General: Screening Current      Cervical Cancer Screening:    General: Screening Not Indicated      Osteoporosis Screening:    General: Screening Not Indicated and History Osteoporosis      Abdominal Aortic Aneurysm (AAA) Screening:        General: Screening Not Indicated      Lung Cancer Screening:     General: Screening Not Indicated      Hepatitis C Screening:    General: Screening Current    Screening, Brief Intervention, and Referral to Treatment (SBIRT)    Screening  Typical number of drinks in a day: 0  Typical number of drinks in a week: 0  Interpretation: Low risk drinking behavior.    AUDIT-C Screenin) How often did you have a drink containing alcohol in the past year? never  2) How many drinks did you have on a typical day when you were drinking in the past year? 0  3) How often did you have 6 or more drinks on one occasion in the past year? never    AUDIT-C Score: 0  Interpretation: Score 0-2 (female): Negative screen for alcohol misuse    Single Item Drug Screening:  How often have you used an illegal drug (including marijuana) or a prescription medication for  non-medical reasons in the past year? never    Single Item Drug Screen Score: 0  Interpretation: Negative screen for possible drug use disorder    Brief Intervention  Alcohol & drug use screenings were reviewed. No concerns regarding substance use disorder identified.     Social Determinants of Health     Financial Resource Strain: Low Risk  (3/29/2023)    Overall Financial Resource Strain (CARDIA)     Difficulty of Paying Living Expenses: Not hard at all   Food Insecurity: No Food Insecurity (8/8/2024)    Hunger Vital Sign     Worried About Running Out of Food in the Last Year: Never true     Ran Out of Food in the Last Year: Never true   Transportation Needs: No Transportation Needs (8/8/2024)    PRAPARE - Transportation     Lack of Transportation (Medical): No     Lack of Transportation (Non-Medical): No   Housing Stability: Unknown (8/8/2024)    Housing Stability Vital Sign     Unable to Pay for Housing in the Last Year: No     Homeless in the Last Year: No   Utilities: Not At Risk (8/8/2024)    [facility] Utilities     Threatened with loss of utilities: No     No results found.    Objective     Ht 5' (1.524 m)   Wt 61.6 kg (135 lb 12.8 oz)   LMP  (LMP Unknown)   BMI 26.52 kg/m²     Physical Exam  Vitals and nursing note reviewed.   Constitutional:       Appearance: She is well-developed.   Cardiovascular:      Rate and Rhythm: Normal rate and regular rhythm.   Pulmonary:      Effort: Pulmonary effort is normal.      Breath sounds: Normal breath sounds.   Abdominal:      General: Abdomen is flat.      Tenderness: There is no abdominal tenderness.   Musculoskeletal:      Right lower leg: No edema.      Left lower leg: No edema.   Neurological:      Mental Status: She is alert and oriented to person, place, and time.   Psychiatric:         Behavior: Behavior normal.         Thought Content: Thought content normal.         Judgment: Judgment normal.

## 2024-10-09 NOTE — PATIENT INSTRUCTIONS
Medicare Preventive Visit Patient Instructions  Thank you for completing your Welcome to Medicare Visit or Medicare Annual Wellness Visit today. Your next wellness visit will be due in one year (10/10/2025).  The screening/preventive services that you may require over the next 5-10 years are detailed below. Some tests may not apply to you based off risk factors and/or age. Screening tests ordered at today's visit but not completed yet may show as past due. Also, please note that scanned in results may not display below.  Preventive Screenings:  Service Recommendations Previous Testing/Comments   Colorectal Cancer Screening  * Colonoscopy    * Fecal Occult Blood Test (FOBT)/Fecal Immunochemical Test (FIT)  * Fecal DNA/Cologuard Test  * Flexible Sigmoidoscopy Age: 45-75 years old   Colonoscopy: every 10 years (may be performed more frequently if at higher risk)  OR  FOBT/FIT: every 1 year  OR  Cologuard: every 3 years  OR  Sigmoidoscopy: every 5 years  Screening may be recommended earlier than age 45 if at higher risk for colorectal cancer. Also, an individualized decision between you and your healthcare provider will decide whether screening between the ages of 76-85 would be appropriate. Colonoscopy: Not on file  FOBT/FIT: Not on file  Cologuard: 05/18/2022  Sigmoidoscopy: Not on file    Screening Current     Breast Cancer Screening Age: 40+ years old  Frequency: every 1-2 years  Not required if history of left and right mastectomy Mammogram: 02/09/2024    Screening Current   Cervical Cancer Screening Between the ages of 21-29, pap smear recommended once every 3 years.   Between the ages of 30-65, can perform pap smear with HPV co-testing every 5 years.   Recommendations may differ for women with a history of total hysterectomy, cervical cancer, or abnormal pap smears in past. Pap Smear: Not on file    Screening Not Indicated   Hepatitis C Screening Once for adults born between 1945 and 1965  More frequently in  patients at high risk for Hepatitis C Hep C Antibody: 01/28/2019    Screening Current   Diabetes Screening 1-2 times per year if you're at risk for diabetes or have pre-diabetes Fasting glucose: 252 mg/dL (8/6/2024)  A1C: 9.4 % (8/6/2024)  Screening Not Indicated  History Diabetes   Cholesterol Screening Once every 5 years if you don't have a lipid disorder. May order more often based on risk factors. Lipid panel: 08/06/2024    Screening Not Indicated  History Lipid Disorder     Other Preventive Screenings Covered by Medicare:  Abdominal Aortic Aneurysm (AAA) Screening: covered once if your at risk. You're considered to be at risk if you have a family history of AAA.  Lung Cancer Screening: covers low dose CT scan once per year if you meet all of the following conditions: (1) Age 55-77; (2) No signs or symptoms of lung cancer; (3) Current smoker or have quit smoking within the last 15 years; (4) You have a tobacco smoking history of at least 20 pack years (packs per day multiplied by number of years you smoked); (5) You get a written order from a healthcare provider.  Glaucoma Screening: covered annually if you're considered high risk: (1) You have diabetes OR (2) Family history of glaucoma OR (3)  aged 50 and older OR (4)  American aged 65 and older  Osteoporosis Screening: covered every 2 years if you meet one of the following conditions: (1) You're estrogen deficient and at risk for osteoporosis based off medical history and other findings; (2) Have a vertebral abnormality; (3) On glucocorticoid therapy for more than 3 months; (4) Have primary hyperparathyroidism; (5) On osteoporosis medications and need to assess response to drug therapy.   Last bone density test (DXA Scan): 12/10/2018.  HIV Screening: covered annually if you're between the age of 15-65. Also covered annually if you are younger than 15 and older than 65 with risk factors for HIV infection. For pregnant patients, it is  covered up to 3 times per pregnancy.    Immunizations:  Immunization Recommendations   Influenza Vaccine Annual influenza vaccination during flu season is recommended for all persons aged >= 6 months who do not have contraindications   Pneumococcal Vaccine   * Pneumococcal conjugate vaccine = PCV13 (Prevnar 13), PCV15 (Vaxneuvance), PCV20 (Prevnar 20)  * Pneumococcal polysaccharide vaccine = PPSV23 (Pneumovax) Adults 19-65 yo with certain risk factors or if 65+ yo  If never received any pneumonia vaccine: recommend Prevnar 20 (PCV20)  Give PCV20 if previously received 1 dose of PCV13 or PPSV23   Hepatitis B Vaccine 3 dose series if at intermediate or high risk (ex: diabetes, end stage renal disease, liver disease)   Respiratory syncytial virus (RSV) Vaccine - COVERED BY MEDICARE PART D  * RSVPreF3 (Arexvy) CDC recommends that adults 60 years of age and older may receive a single dose of RSV vaccine using shared clinical decision-making (SCDM)   Tetanus (Td) Vaccine - COST NOT COVERED BY MEDICARE PART B Following completion of primary series, a booster dose should be given every 10 years to maintain immunity against tetanus. Td may also be given as tetanus wound prophylaxis.   Tdap Vaccine - COST NOT COVERED BY MEDICARE PART B Recommended at least once for all adults. For pregnant patients, recommended with each pregnancy.   Shingles Vaccine (Shingrix) - COST NOT COVERED BY MEDICARE PART B  2 shot series recommended in those 19 years and older who have or will have weakened immune systems or those 50 years and older     Health Maintenance Due:      Topic Date Due   • Breast Cancer Screening: Mammogram  02/09/2025   • Colorectal Cancer Screening  05/18/2025   • Hepatitis C Screening  Completed     Immunizations Due:      Topic Date Due   • Influenza Vaccine (1) 09/01/2024     Advance Directives   What are advance directives?  Advance directives are legal documents that state your wishes and plans for medical care.  These plans are made ahead of time in case you lose your ability to make decisions for yourself. Advance directives can apply to any medical decision, such as the treatments you want, and if you want to donate organs.   What are the types of advance directives?  There are many types of advance directives, and each state has rules about how to use them. You may choose a combination of any of the following:  Living will:  This is a written record of the treatment you want. You can also choose which treatments you do not want, which to limit, and which to stop at a certain time. This includes surgery, medicine, IV fluid, and tube feedings.   Durable power of  for healthcare (DPAHC):  This is a written record that states who you want to make healthcare choices for you when you are unable to make them for yourself. This person, called a proxy, is usually a family member or a friend. You may choose more than 1 proxy.  Do not resuscitate (DNR) order:  A DNR order is used in case your heart stops beating or you stop breathing. It is a request not to have certain forms of treatment, such as CPR. A DNR order may be included in other types of advance directives.  Medical directive:  This covers the care that you want if you are in a coma, near death, or unable to make decisions for yourself. You can list the treatments you want for each condition. Treatment may include pain medicine, surgery, blood transfusions, dialysis, IV or tube feedings, and a ventilator (breathing machine).  Values history:  This document has questions about your views, beliefs, and how you feel and think about life. This information can help others choose the care that you would choose.  Why are advance directives important?  An advance directive helps you control your care. Although spoken wishes may be used, it is better to have your wishes written down. Spoken wishes can be misunderstood, or not followed. Treatments may be given even if you  do not want them. An advance directive may make it easier for your family to make difficult choices about your care.   Weight Management   Why it is important to manage your weight:  Being overweight increases your risk of health conditions such as heart disease, high blood pressure, type 2 diabetes, and certain types of cancer. It can also increase your risk for osteoarthritis, sleep apnea, and other respiratory problems. Aim for a slow, steady weight loss. Even a small amount of weight loss can lower your risk of health problems.  How to lose weight safely:  A safe and healthy way to lose weight is to eat fewer calories and get regular exercise. You can lose up about 1 pound a week by decreasing the number of calories you eat by 500 calories each day.   Healthy meal plan for weight management:  A healthy meal plan includes a variety of foods, contains fewer calories, and helps you stay healthy. A healthy meal plan includes the following:  Eat whole-grain foods more often.  A healthy meal plan should contain fiber. Fiber is the part of grains, fruits, and vegetables that is not broken down by your body. Whole-grain foods are healthy and provide extra fiber in your diet. Some examples of whole-grain foods are whole-wheat breads and pastas, oatmeal, brown rice, and bulgur.  Eat a variety of vegetables every day.  Include dark, leafy greens such as spinach, kale, keli greens, and mustard greens. Eat yellow and orange vegetables such as carrots, sweet potatoes, and winter squash.   Eat a variety of fruits every day.  Choose fresh or canned fruit (canned in its own juice or light syrup) instead of juice. Fruit juice has very little or no fiber.  Eat low-fat dairy foods.  Drink fat-free (skim) milk or 1% milk. Eat fat-free yogurt and low-fat cottage cheese. Try low-fat cheeses such as mozzarella and other reduced-fat cheeses.  Choose meat and other protein foods that are low in fat.  Choose beans or other legumes such  as split peas or lentils. Choose fish, skinless poultry (chicken or turkey), or lean cuts of red meat (beef or pork). Before you cook meat or poultry, cut off any visible fat.   Use less fat and oil.  Try baking foods instead of frying them. Add less fat, such as margarine, sour cream, regular salad dressing and mayonnaise to foods. Eat fewer high-fat foods. Some examples of high-fat foods include french fries, doughnuts, ice cream, and cakes.  Eat fewer sweets.  Limit foods and drinks that are high in sugar. This includes candy, cookies, regular soda, and sweetened drinks.  Exercise:  Exercise at least 30 minutes per day on most days of the week. Some examples of exercise include walking, biking, dancing, and swimming. You can also fit in more physical activity by taking the stairs instead of the elevator or parking farther away from stores. Ask your healthcare provider about the best exercise plan for you.      © Copyright [employer] 2018 Information is for End User's use only and may not be sold, redistributed or otherwise used for commercial purposes. All illustrations and images included in CareNotes® are the copyrighted property of A.D.A.M., Inc. or [employer]

## 2024-10-10 ENCOUNTER — TELEPHONE (OUTPATIENT)
Dept: ADMINISTRATIVE | Facility: OTHER | Age: 74
End: 2024-10-10

## 2024-10-10 NOTE — TELEPHONE ENCOUNTER
----- Message from Silvana MONET sent at 10/9/2024  9:47 AM EDT -----  10/09/24 9:48 AM    Hello, our patient No patient name on file. has had Diabetic Eye Exam completed/performed. Please assist in updating the patient chart by making an External outreach to [facility] located in [facility]. The date of service is [phone].    Thank you,  Silvana Wood LPN  [facility] [place]

## 2024-10-10 NOTE — LETTER
Diabetic Eye Exam Form    Date Requested: 10/10/24  Patient: Bety Engle  Patient : 1950   Referring Provider: Abdiaziz Wall MD      DIABETIC Eye Exam Date _______________________________      Type of Exam MUST be documented for Diabetic Eye Exams. Please CHECK ONE.     Retinal Exam       Dilated Retinal Exam       OCT       Optomap-Iris Exam      Fundus Photography       Left Eye - Please check Retinopathy or No Retinopathy        Exam did show retinopathy    Exam did not show retinopathy       Right Eye - Please check Retinopathy or No Retinopathy       Exam did show retinopathy    Exam did not show retinopathy       Comments _____Most recent  _____________________________________________________    Practice Providing Exam ______________________________________________    Exam Performed By (print name) _______________________________________      Provider Signature ___________________________________________________      These reports are needed for  compliance.  Please fax this completed form and a copy of the Diabetic Eye Exam report to our office located at [address] [place] [address] as soon as possible via Fax [phone] attention Monica: Phone [phone]  We thank you for your assistance in treating our mutual patient.

## 2024-10-10 NOTE — TELEPHONE ENCOUNTER
Upon review of the In Basket request we were able to locate, review, and update the patient chart as requested for Diabetic Eye Exam.    Any additional questions or concerns should be emailed to the Practice Liaisons via the appropriate education email address, please do not reply via In Basket.    Thank you  Monica Sanchez MA   [facility]

## 2024-10-10 NOTE — TELEPHONE ENCOUNTER
Upon review of the In Basket request and the patient's chart, initial outreach has been made via fax to facility. Please see Contacts section for details.     Thank you  Monica Sanchez MA

## 2024-11-03 DIAGNOSIS — E11.9 TYPE 2 DIABETES MELLITUS WITHOUT COMPLICATION, WITHOUT LONG-TERM CURRENT USE OF INSULIN (HCC): ICD-10-CM

## 2024-11-04 DIAGNOSIS — E03.9 HYPOTHYROIDISM, ACQUIRED: ICD-10-CM

## 2024-11-05 RX ORDER — LEVOTHYROXINE SODIUM 50 UG/1
50 TABLET ORAL DAILY
Qty: 90 TABLET | Refills: 3 | Status: SHIPPED | OUTPATIENT
Start: 2024-11-05

## 2024-12-16 ENCOUNTER — VBI (OUTPATIENT)
Dept: ADMINISTRATIVE | Facility: OTHER | Age: 74
End: 2024-12-16

## 2024-12-16 NOTE — TELEPHONE ENCOUNTER
12/16/24 2:50 PM     Chart reviewed for Diabetic Eye Exam and Hemoglobin A1c  blood pressure readingwas/were submitted to the patient's insurance.     Justina Dunn   [facility]

## 2025-01-08 ENCOUNTER — RA CDI HCC (OUTPATIENT)
Dept: OTHER | Facility: HOSPITAL | Age: 75
End: 2025-01-08

## 2025-01-08 DIAGNOSIS — Z85.828 HISTORY OF BASAL CELL CANCER: Primary | ICD-10-CM

## 2025-01-28 ENCOUNTER — APPOINTMENT (OUTPATIENT)
Dept: LAB | Facility: CLINIC | Age: 75
End: 2025-01-28
Payer: COMMERCIAL

## 2025-01-28 DIAGNOSIS — E11.65 TYPE 2 DIABETES MELLITUS WITH HYPERGLYCEMIA, WITHOUT LONG-TERM CURRENT USE OF INSULIN (HCC): ICD-10-CM

## 2025-01-28 DIAGNOSIS — E03.9 HYPOTHYROIDISM, ACQUIRED: ICD-10-CM

## 2025-01-28 LAB
ALBUMIN SERPL BCG-MCNC: 4.7 G/DL (ref 3.5–5)
ALP SERPL-CCNC: 86 U/L (ref 34–104)
ALT SERPL W P-5'-P-CCNC: 22 U/L (ref 7–52)
ANION GAP SERPL CALCULATED.3IONS-SCNC: 13 MMOL/L (ref 4–13)
AST SERPL W P-5'-P-CCNC: 20 U/L (ref 13–39)
BASOPHILS # BLD AUTO: 0.06 THOUSANDS/ΜL (ref 0–0.1)
BASOPHILS NFR BLD AUTO: 1 % (ref 0–1)
BILIRUB SERPL-MCNC: 0.38 MG/DL (ref 0.2–1)
BUN SERPL-MCNC: 15 MG/DL (ref 5–25)
CALCIUM SERPL-MCNC: 9.7 MG/DL (ref 8.4–10.2)
CHLORIDE SERPL-SCNC: 99 MMOL/L (ref 96–108)
CHOLEST SERPL-MCNC: 159 MG/DL (ref ?–200)
CO2 SERPL-SCNC: 24 MMOL/L (ref 21–32)
CREAT SERPL-MCNC: 0.68 MG/DL (ref 0.6–1.3)
CREAT UR-MCNC: 73.6 MG/DL
EOSINOPHIL # BLD AUTO: 0.23 THOUSAND/ΜL (ref 0–0.61)
EOSINOPHIL NFR BLD AUTO: 3 % (ref 0–6)
ERYTHROCYTE [DISTWIDTH] IN BLOOD BY AUTOMATED COUNT: 13.1 % (ref 11.6–15.1)
EST. AVERAGE GLUCOSE BLD GHB EST-MCNC: 220 MG/DL
GFR SERPL CREATININE-BSD FRML MDRD: 87 ML/MIN/1.73SQ M
GLUCOSE P FAST SERPL-MCNC: 225 MG/DL (ref 65–99)
HBA1C MFR BLD: 9.3 %
HCT VFR BLD AUTO: 39.2 % (ref 34.8–46.1)
HDLC SERPL-MCNC: 46 MG/DL
HGB BLD-MCNC: 12.9 G/DL (ref 11.5–15.4)
IMM GRANULOCYTES # BLD AUTO: 0.04 THOUSAND/UL (ref 0–0.2)
IMM GRANULOCYTES NFR BLD AUTO: 1 % (ref 0–2)
LDLC SERPL CALC-MCNC: 63 MG/DL (ref 0–100)
LYMPHOCYTES # BLD AUTO: 2.44 THOUSANDS/ΜL (ref 0.6–4.47)
LYMPHOCYTES NFR BLD AUTO: 31 % (ref 14–44)
MCH RBC QN AUTO: 29.3 PG (ref 26.8–34.3)
MCHC RBC AUTO-ENTMCNC: 32.9 G/DL (ref 31.4–37.4)
MCV RBC AUTO: 89 FL (ref 82–98)
MICROALBUMIN UR-MCNC: 152.5 MG/L
MICROALBUMIN/CREAT 24H UR: 207 MG/G CREATININE (ref 0–30)
MONOCYTES # BLD AUTO: 0.53 THOUSAND/ΜL (ref 0.17–1.22)
MONOCYTES NFR BLD AUTO: 7 % (ref 4–12)
NEUTROPHILS # BLD AUTO: 4.67 THOUSANDS/ΜL (ref 1.85–7.62)
NEUTS SEG NFR BLD AUTO: 57 % (ref 43–75)
NRBC BLD AUTO-RTO: 0 /100 WBCS
PLATELET # BLD AUTO: 236 THOUSANDS/UL (ref 149–390)
PMV BLD AUTO: 12.6 FL (ref 8.9–12.7)
POTASSIUM SERPL-SCNC: 4 MMOL/L (ref 3.5–5.3)
PROT SERPL-MCNC: 7.7 G/DL (ref 6.4–8.4)
RBC # BLD AUTO: 4.4 MILLION/UL (ref 3.81–5.12)
SODIUM SERPL-SCNC: 136 MMOL/L (ref 135–147)
T4 FREE SERPL-MCNC: 0.91 NG/DL (ref 0.61–1.12)
TRIGL SERPL-MCNC: 252 MG/DL (ref ?–150)
TSH SERPL DL<=0.05 MIU/L-ACNC: 1.25 UIU/ML (ref 0.45–4.5)
WBC # BLD AUTO: 7.97 THOUSAND/UL (ref 4.31–10.16)

## 2025-01-28 PROCEDURE — 82570 ASSAY OF URINE CREATININE: CPT

## 2025-01-28 PROCEDURE — 84443 ASSAY THYROID STIM HORMONE: CPT

## 2025-01-28 PROCEDURE — 85025 COMPLETE CBC W/AUTO DIFF WBC: CPT

## 2025-01-28 PROCEDURE — 82043 UR ALBUMIN QUANTITATIVE: CPT

## 2025-01-28 PROCEDURE — 83036 HEMOGLOBIN GLYCOSYLATED A1C: CPT

## 2025-01-28 PROCEDURE — 80053 COMPREHEN METABOLIC PANEL: CPT

## 2025-01-28 PROCEDURE — 36415 COLL VENOUS BLD VENIPUNCTURE: CPT

## 2025-01-28 PROCEDURE — 80061 LIPID PANEL: CPT

## 2025-01-28 PROCEDURE — 84439 ASSAY OF FREE THYROXINE: CPT

## 2025-02-04 ENCOUNTER — OFFICE VISIT (OUTPATIENT)
Dept: INTERNAL MEDICINE CLINIC | Facility: CLINIC | Age: 75
End: 2025-02-04
Payer: COMMERCIAL

## 2025-02-04 VITALS
WEIGHT: 135 LBS | SYSTOLIC BLOOD PRESSURE: 138 MMHG | HEIGHT: 60 IN | HEART RATE: 83 BPM | OXYGEN SATURATION: 97 % | DIASTOLIC BLOOD PRESSURE: 70 MMHG | BODY MASS INDEX: 26.5 KG/M2

## 2025-02-04 DIAGNOSIS — G35 MULTIPLE SCLEROSIS (HCC): ICD-10-CM

## 2025-02-04 DIAGNOSIS — E11.9 TYPE 2 DIABETES MELLITUS WITHOUT COMPLICATION, WITHOUT LONG-TERM CURRENT USE OF INSULIN (HCC): Primary | ICD-10-CM

## 2025-02-04 DIAGNOSIS — E78.2 MIXED HYPERLIPIDEMIA: ICD-10-CM

## 2025-02-04 DIAGNOSIS — E03.9 HYPOTHYROIDISM, ACQUIRED: ICD-10-CM

## 2025-02-04 PROCEDURE — G2211 COMPLEX E/M VISIT ADD ON: HCPCS | Performed by: INTERNAL MEDICINE

## 2025-02-04 PROCEDURE — 99214 OFFICE O/P EST MOD 30 MIN: CPT | Performed by: INTERNAL MEDICINE

## 2025-02-04 RX ORDER — GLIPIZIDE 2.5 MG/1
2.5 TABLET, EXTENDED RELEASE ORAL DAILY
Qty: 100 TABLET | Refills: 1 | Status: SHIPPED | OUTPATIENT
Start: 2025-02-04

## 2025-02-04 NOTE — PROGRESS NOTES
Name: Bety Engle      : 1950      MRN: [id number]  Encounter Provider: Abdiaziz Wall MD  Encounter Date: 2025   Encounter department: [facility] [place]  :  Assessment & Plan  Type 2 diabetes mellitus without complication, without long-term current use of insulin (HCC)  She really does not want to go on insulin.  The other medicines are too expensive for her and not on her formulary.  We settled on glipizide.  But warned her of low sugar possibilities with that.  She needs to be careful.  Lab Results   Component Value Date    HGBA1C 9.3 (H) 2025       Orders:  •  glipiZIDE (GLUCOTROL XL) 2.5 mg 24 hr tablet; Take 1 tablet (2.5 mg total) by mouth daily  •  Hemoglobin A1C; Future  •  Comprehensive metabolic panel; Future  •  CBC and differential; Future  •  Lipid Panel with Direct LDL reflex; Future    Hypothyroidism, acquired  Controlled.  Continue current medication.       Mixed hyperlipidemia  Stable.  Continue to monitor.       Multiple sclerosis (HCC)  She feels she is fine.  Does not want to see a specialist.              History of Present Illness   Patient comes in today for routine follow-up.  She states she is doing okay but sugars have not come down at all.  She is still reluctant to go on insulin and even checked with the new year whether the other oral agents were affordable and they are not and not covered.  She has a family friend who is a physician and talk to her about glipizide or similar.  She feels she is doing okay with her diet.  Cholesterol is stable.  Taking all of her other medications as directed.  Denies any new complaints today.  No further additions to her history.      Review of Systems   Respiratory:  Negative for shortness of breath.    Cardiovascular:  Negative for chest pain.   Gastrointestinal:  Negative for abdominal pain.       Objective   /70 (BP Location: Left arm, Patient Position: Sitting, Cuff Size: Standard)   Pulse 83    Ht 5' (1.524 m)   Wt 61.2 kg (135 lb)   LMP  (LMP Unknown)   SpO2 97%   BMI 26.37 kg/m²      Physical Exam  Vitals and nursing note reviewed.   Constitutional:       Appearance: Normal appearance. She is well-developed.   Cardiovascular:      Rate and Rhythm: Normal rate and regular rhythm.      Heart sounds: Normal heart sounds.   Pulmonary:      Effort: Pulmonary effort is normal.      Breath sounds: Normal breath sounds.   Abdominal:      Palpations: Abdomen is soft.      Tenderness: There is no abdominal tenderness.   Neurological:      Mental Status: She is alert and oriented to person, place, and time.   Psychiatric:         Mood and Affect: Mood normal.         Behavior: Behavior normal.

## 2025-02-04 NOTE — ASSESSMENT & PLAN NOTE
She really does not want to go on insulin.  The other medicines are too expensive for her and not on her formulary.  We settled on glipizide.  But warned her of low sugar possibilities with that.  She needs to be careful.  Lab Results   Component Value Date    HGBA1C 9.3 (H) 01/28/2025       Orders:  •  glipiZIDE (GLUCOTROL XL) 2.5 mg 24 hr tablet; Take 1 tablet (2.5 mg total) by mouth daily  •  Hemoglobin A1C; Future  •  Comprehensive metabolic panel; Future  •  CBC and differential; Future  •  Lipid Panel with Direct LDL reflex; Future

## 2025-02-18 ENCOUNTER — TELEPHONE (OUTPATIENT)
Age: 75
End: 2025-02-18

## 2025-02-18 NOTE — TELEPHONE ENCOUNTER
Patient called asking if it can be possible for Dr. Wall to write a letter stating that patient has MS as a chronic medical condition. Patient called [employer] as she saw a commercial to get extra benefits though them but was informed that a medical statement is necessary. Patient couldn't provide a specific fax number as to where we should fax it so she will call [employer] to obtain that information and then call us back.

## 2025-02-27 DIAGNOSIS — M81.0 AGE-RELATED OSTEOPOROSIS WITHOUT CURRENT PATHOLOGICAL FRACTURE: ICD-10-CM

## 2025-02-28 RX ORDER — IBANDRONATE SODIUM 150 MG/1
150 TABLET, FILM COATED ORAL
Qty: 3 TABLET | Refills: 10 | Status: SHIPPED | OUTPATIENT
Start: 2025-02-28

## 2025-04-02 ENCOUNTER — OFFICE VISIT (OUTPATIENT)
Dept: INTERNAL MEDICINE CLINIC | Facility: CLINIC | Age: 75
End: 2025-04-02
Payer: COMMERCIAL

## 2025-04-02 VITALS
OXYGEN SATURATION: 98 % | SYSTOLIC BLOOD PRESSURE: 154 MMHG | HEIGHT: 60 IN | DIASTOLIC BLOOD PRESSURE: 82 MMHG | HEART RATE: 84 BPM | BODY MASS INDEX: 26.37 KG/M2

## 2025-04-02 DIAGNOSIS — H65.21 CHRONIC SEROUS OTITIS MEDIA OF RIGHT EAR: Primary | ICD-10-CM

## 2025-04-02 PROCEDURE — 99213 OFFICE O/P EST LOW 20 MIN: CPT | Performed by: PHYSICIAN ASSISTANT

## 2025-04-02 PROCEDURE — G2211 COMPLEX E/M VISIT ADD ON: HCPCS | Performed by: PHYSICIAN ASSISTANT

## 2025-04-02 RX ORDER — IBUPROFEN 800 MG/1
800 TABLET, FILM COATED ORAL EVERY 6 HOURS PRN
COMMUNITY
Start: 2025-03-26

## 2025-04-02 RX ORDER — AMOXICILLIN 500 MG/1
TABLET, FILM COATED ORAL
COMMUNITY
Start: 2025-03-26 | End: 2025-04-02 | Stop reason: ALTCHOICE

## 2025-04-02 NOTE — PROGRESS NOTES
Name: Bety Engle      : 1950      MRN: [id number]  Encounter Provider: Werner Bautista PA-C  Encounter Date: 2025   Encounter department: [facility] [place]  :  Assessment & Plan  Chronic serous otitis media of right ear    Orders:    Ambulatory Referral to Otolaryngology; Future           History of Present Illness   Acute visit    Patient with chronic problems with her ears with chronic serous otitis media, and history of myringotomy tube placement, last done in October in the right ear.  According to the patient she has also recently had dental problems, was to the dentist had dental work after receiving facial injections.  This to numb her face for the procedure.  Afterwards she developed spasmodic pain around her right ear that has been coming and going.  It has been uncomfortable and with her history of ear problems tried to contact her ENT specialist however he had no appointments until May.  With her history of ongoing ear problems she has had decreased hearing.  Patient would also like to entertain evaluation by [facility] ear nose and throat.      Review of Systems   Constitutional:  Negative for activity change, chills, fatigue and fever.   HENT:  Positive for ear pain and hearing loss. Negative for congestion.    Eyes:  Negative for discharge.   Respiratory:  Negative for cough, chest tightness and shortness of breath.    Cardiovascular:  Negative for chest pain, palpitations and leg swelling.   Gastrointestinal:  Negative for abdominal pain.   Genitourinary:  Negative for difficulty urinating.   Musculoskeletal:  Negative for arthralgias and myalgias.   Skin:  Negative for rash.   Allergic/Immunologic: Negative for immunocompromised state.   Neurological:  Negative for dizziness, syncope, weakness, light-headedness and headaches.   Hematological:  Negative for adenopathy. Does not bruise/bleed easily.   Psychiatric/Behavioral:  Negative for dysphoric mood, sleep  disturbance and suicidal ideas. The patient is not nervous/anxious.        Objective   /82 (BP Location: Left arm, Patient Position: Sitting, Cuff Size: Standard)   Pulse 84   Ht 5' (1.524 m)   LMP  (LMP Unknown)   SpO2 98%   BMI 26.37 kg/m²      Physical Exam  Constitutional:       General: She is not in acute distress.     Appearance: Normal appearance.   HENT:      Head: Normocephalic.      Right Ear: Ear canal and external ear normal.      Left Ear: Ear canal and external ear normal.      Ears:      Comments: Both TMs are scarred.  Right tympanic membrane is very cloudy with fluid behind.     Nose: Nose normal.   Eyes:      Extraocular Movements: Extraocular movements intact.      Conjunctiva/sclera: Conjunctivae normal.      Pupils: Pupils are equal, round, and reactive to light.   Neck:      Thyroid: No thyromegaly.      Vascular: No carotid bruit.      Trachea: Trachea normal.   Cardiovascular:      Rate and Rhythm: Normal rate and regular rhythm.      Heart sounds: No murmur heard.  Pulmonary:      Effort: Pulmonary effort is normal. No respiratory distress.      Breath sounds: Normal breath sounds.   Musculoskeletal:         General: No swelling.      Cervical back: Neck supple.      Right lower leg: No edema.      Left lower leg: No edema.   Lymphadenopathy:      Cervical: No cervical adenopathy.   Skin:     General: Skin is warm and dry.      Findings: No rash.   Neurological:      General: No focal deficit present.      Mental Status: She is alert and oriented to person, place, and time. Mental status is at baseline.   Psychiatric:         Mood and Affect: Mood normal.         Behavior: Behavior normal.

## 2025-04-02 NOTE — PATIENT INSTRUCTIONS
Stop the amoxicillin.  Since you have a supply of ciprofloxacin, start one 500 mg tablet twice daily.  Always wise to take a probiotic and or eat yogurt daily when on an antibiotic.  Will refer you to ear nose and throat for reevaluation.

## 2025-04-03 ENCOUNTER — TELEPHONE (OUTPATIENT)
Age: 75
End: 2025-04-03

## 2025-04-03 NOTE — TELEPHONE ENCOUNTER
Patient was advised by staff at PCP office to inform them when she scheduled appointment with ENT.    Patient states she will be seeing Dr. Bailey on 5/15/2025 at 9:00 am at the [facility].

## 2025-05-07 ENCOUNTER — APPOINTMENT (OUTPATIENT)
Dept: LAB | Facility: CLINIC | Age: 75
End: 2025-05-07
Payer: COMMERCIAL

## 2025-05-07 DIAGNOSIS — E11.9 TYPE 2 DIABETES MELLITUS WITHOUT COMPLICATION, WITHOUT LONG-TERM CURRENT USE OF INSULIN (HCC): ICD-10-CM

## 2025-05-07 LAB
ALBUMIN SERPL BCG-MCNC: 4.9 G/DL (ref 3.5–5)
ALP SERPL-CCNC: 72 U/L (ref 34–104)
ALT SERPL W P-5'-P-CCNC: 27 U/L (ref 7–52)
ANION GAP SERPL CALCULATED.3IONS-SCNC: 11 MMOL/L (ref 4–13)
AST SERPL W P-5'-P-CCNC: 23 U/L (ref 13–39)
BASOPHILS # BLD AUTO: 0.05 THOUSANDS/ÂΜL (ref 0–0.1)
BASOPHILS NFR BLD AUTO: 1 % (ref 0–1)
BILIRUB SERPL-MCNC: 0.51 MG/DL (ref 0.2–1)
BUN SERPL-MCNC: 12 MG/DL (ref 5–25)
CALCIUM SERPL-MCNC: 9.9 MG/DL (ref 8.4–10.2)
CHLORIDE SERPL-SCNC: 100 MMOL/L (ref 96–108)
CHOLEST SERPL-MCNC: 139 MG/DL (ref ?–200)
CO2 SERPL-SCNC: 28 MMOL/L (ref 21–32)
CREAT SERPL-MCNC: 0.66 MG/DL (ref 0.6–1.3)
EOSINOPHIL # BLD AUTO: 0.22 THOUSAND/ÂΜL (ref 0–0.61)
EOSINOPHIL NFR BLD AUTO: 3 % (ref 0–6)
ERYTHROCYTE [DISTWIDTH] IN BLOOD BY AUTOMATED COUNT: 13.4 % (ref 11.6–15.1)
EST. AVERAGE GLUCOSE BLD GHB EST-MCNC: 166 MG/DL
GFR SERPL CREATININE-BSD FRML MDRD: 88 ML/MIN/1.73SQ M
GLUCOSE P FAST SERPL-MCNC: 167 MG/DL (ref 65–99)
HBA1C MFR BLD: 7.4 %
HCT VFR BLD AUTO: 39.9 % (ref 34.8–46.1)
HDLC SERPL-MCNC: 43 MG/DL
HGB BLD-MCNC: 12.7 G/DL (ref 11.5–15.4)
IMM GRANULOCYTES # BLD AUTO: 0.05 THOUSAND/UL (ref 0–0.2)
IMM GRANULOCYTES NFR BLD AUTO: 1 % (ref 0–2)
LDLC SERPL CALC-MCNC: 57 MG/DL (ref 0–100)
LYMPHOCYTES # BLD AUTO: 2.25 THOUSANDS/ÂΜL (ref 0.6–4.47)
LYMPHOCYTES NFR BLD AUTO: 30 % (ref 14–44)
MCH RBC QN AUTO: 28.4 PG (ref 26.8–34.3)
MCHC RBC AUTO-ENTMCNC: 31.8 G/DL (ref 31.4–37.4)
MCV RBC AUTO: 89 FL (ref 82–98)
MONOCYTES # BLD AUTO: 0.58 THOUSAND/ÂΜL (ref 0.17–1.22)
MONOCYTES NFR BLD AUTO: 8 % (ref 4–12)
NEUTROPHILS # BLD AUTO: 4.45 THOUSANDS/ÂΜL (ref 1.85–7.62)
NEUTS SEG NFR BLD AUTO: 57 % (ref 43–75)
NRBC BLD AUTO-RTO: 0 /100 WBCS
PLATELET # BLD AUTO: 266 THOUSANDS/UL (ref 149–390)
PMV BLD AUTO: 11.7 FL (ref 8.9–12.7)
POTASSIUM SERPL-SCNC: 3.7 MMOL/L (ref 3.5–5.3)
PROT SERPL-MCNC: 7.7 G/DL (ref 6.4–8.4)
RBC # BLD AUTO: 4.47 MILLION/UL (ref 3.81–5.12)
SODIUM SERPL-SCNC: 139 MMOL/L (ref 135–147)
TRIGL SERPL-MCNC: 196 MG/DL (ref ?–150)
WBC # BLD AUTO: 7.6 THOUSAND/UL (ref 4.31–10.16)

## 2025-05-07 PROCEDURE — 80061 LIPID PANEL: CPT

## 2025-05-07 PROCEDURE — 36415 COLL VENOUS BLD VENIPUNCTURE: CPT

## 2025-05-07 PROCEDURE — 80053 COMPREHEN METABOLIC PANEL: CPT

## 2025-05-07 PROCEDURE — 83036 HEMOGLOBIN GLYCOSYLATED A1C: CPT

## 2025-05-07 PROCEDURE — 85025 COMPLETE CBC W/AUTO DIFF WBC: CPT

## 2025-05-14 ENCOUNTER — OFFICE VISIT (OUTPATIENT)
Dept: INTERNAL MEDICINE CLINIC | Facility: CLINIC | Age: 75
End: 2025-05-14
Payer: COMMERCIAL

## 2025-05-14 VITALS
BODY MASS INDEX: 26.5 KG/M2 | SYSTOLIC BLOOD PRESSURE: 156 MMHG | DIASTOLIC BLOOD PRESSURE: 76 MMHG | WEIGHT: 135 LBS | OXYGEN SATURATION: 99 % | RESPIRATION RATE: 18 BRPM | HEART RATE: 85 BPM | HEIGHT: 60 IN

## 2025-05-14 DIAGNOSIS — E11.9 TYPE 2 DIABETES MELLITUS WITHOUT COMPLICATION, WITHOUT LONG-TERM CURRENT USE OF INSULIN (HCC): Primary | ICD-10-CM

## 2025-05-14 DIAGNOSIS — E78.2 MIXED HYPERLIPIDEMIA: ICD-10-CM

## 2025-05-14 DIAGNOSIS — E03.9 HYPOTHYROIDISM, ACQUIRED: ICD-10-CM

## 2025-05-14 PROCEDURE — G2211 COMPLEX E/M VISIT ADD ON: HCPCS | Performed by: INTERNAL MEDICINE

## 2025-05-14 PROCEDURE — 99214 OFFICE O/P EST MOD 30 MIN: CPT | Performed by: INTERNAL MEDICINE

## 2025-05-14 NOTE — ASSESSMENT & PLAN NOTE
Controlled.  No change in medication.  Continue to monitor.  Orders:  •  T4, free; Future  •  TSH, 3rd generation; Future

## 2025-05-14 NOTE — PROGRESS NOTES
Name: Bety Engle      : 1950      MRN: [id number]  Encounter Provider: Abdiaziz Wall MD  Encounter Date: 2025   Encounter department: [facility] [place]  :  Assessment & Plan  Type 2 diabetes mellitus without complication, without long-term current use of insulin (HCC)  Much better control.  Continue current medications.  Lab Results   Component Value Date    HGBA1C 7.4 (H) 2025       Orders:  •  Comprehensive metabolic panel; Future  •  CBC and differential; Future  •  Hemoglobin A1C; Future  •  Albumin / creatinine urine ratio; Future    Hypothyroidism, acquired  Controlled.  No change in medication.  Continue to monitor.  Orders:  •  T4, free; Future  •  TSH, 3rd generation; Future    Mixed hyperlipidemia  Stable.  Orders:  •  Lipid panel; Future           History of Present Illness   Patient comes in today for routine follow-up.  Her sugars are doing much better with the addition of the glipizide.  She notes no side effects on the medicine.  She has had no trouble with low sugars.  Cholesterol is stable.  Blood pressure was high today but she had gotten into an argument with her .  Usually is controlled.  She is scheduled to see ENT next week because of ongoing issues with her ears.      Review of Systems   Respiratory:  Negative for shortness of breath.    Cardiovascular:  Negative for chest pain.   Gastrointestinal:  Negative for abdominal pain.       Objective   /76 (BP Location: Left arm, Patient Position: Sitting, Cuff Size: Adult)   Pulse 85   Resp 18   Ht 5' (1.524 m)   Wt 61.2 kg (135 lb)   LMP  (LMP Unknown)   SpO2 99%   BMI 26.37 kg/m²      Physical Exam  Vitals and nursing note reviewed.   Constitutional:       Appearance: Normal appearance. She is well-developed.     Cardiovascular:      Rate and Rhythm: Normal rate and regular rhythm.      Heart sounds: Normal heart sounds.   Pulmonary:      Effort: Pulmonary effort is normal.       Breath sounds: Normal breath sounds.   Abdominal:      Palpations: Abdomen is soft.      Tenderness: There is no abdominal tenderness.     Neurological:      Mental Status: She is alert and oriented to person, place, and time.     Psychiatric:         Mood and Affect: Mood normal.         Behavior: Behavior normal.

## 2025-05-14 NOTE — ASSESSMENT & PLAN NOTE
Much better control.  Continue current medications.  Lab Results   Component Value Date    HGBA1C 7.4 (H) 05/07/2025       Orders:  •  Comprehensive metabolic panel; Future  •  CBC and differential; Future  •  Hemoglobin A1C; Future  •  Albumin / creatinine urine ratio; Future

## 2025-06-02 DIAGNOSIS — E78.2 MIXED HYPERLIPIDEMIA: ICD-10-CM

## 2025-06-03 RX ORDER — ROSUVASTATIN CALCIUM 10 MG/1
10 TABLET, COATED ORAL
Qty: 90 TABLET | Refills: 1 | Status: SHIPPED | OUTPATIENT
Start: 2025-06-03

## 2025-06-06 DIAGNOSIS — E11.9 TYPE 2 DIABETES MELLITUS WITHOUT COMPLICATION, WITHOUT LONG-TERM CURRENT USE OF INSULIN (HCC): ICD-10-CM

## 2025-06-06 RX ORDER — GLIPIZIDE 2.5 MG/1
2.5 TABLET, EXTENDED RELEASE ORAL DAILY
Qty: 90 TABLET | Refills: 1 | Status: SHIPPED | OUTPATIENT
Start: 2025-06-06

## 2025-06-15 ENCOUNTER — HOSPITAL ENCOUNTER (OUTPATIENT)
Dept: MRI IMAGING | Facility: HOSPITAL | Age: 75
Discharge: HOME | End: 2025-06-15
Payer: COMMERCIAL

## 2025-06-15 DIAGNOSIS — H91.8X3 ASYMMETRICAL HEARING LOSS: ICD-10-CM

## 2025-06-15 PROCEDURE — A9585 GADOBUTROL INJECTION: HCPCS

## 2025-06-15 PROCEDURE — 70553 MRI BRAIN STEM W/O & W/DYE: CPT

## 2025-06-15 RX ORDER — GADOBUTROL 604.72 MG/ML
6 INJECTION INTRAVENOUS
Status: COMPLETED | OUTPATIENT
Start: 2025-06-15 | End: 2025-06-15

## 2025-06-15 RX ADMIN — GADOBUTROL 6 ML: 604.72 INJECTION INTRAVENOUS at 16:43

## 2025-06-18 DIAGNOSIS — M25.512 LEFT SHOULDER PAIN, UNSPECIFIED CHRONICITY: ICD-10-CM

## 2025-06-18 DIAGNOSIS — E11.9 TYPE 2 DIABETES MELLITUS WITHOUT COMPLICATION, WITHOUT LONG-TERM CURRENT USE OF INSULIN (HCC): ICD-10-CM

## 2025-06-19 ENCOUNTER — VBI (OUTPATIENT)
Dept: ADMINISTRATIVE | Facility: OTHER | Age: 75
End: 2025-06-19

## 2025-06-19 RX ORDER — METHOCARBAMOL 500 MG/1
TABLET, FILM COATED ORAL
Qty: 90 TABLET | Refills: 3 | Status: SHIPPED | OUTPATIENT
Start: 2025-06-19

## 2025-06-19 NOTE — TELEPHONE ENCOUNTER
06/19/25 2:10 PM    The patient was called and a message was left for patient to return a call to the [facility] Department at [place]: Phone [phone] .    Thank you.  Jessica Sanchez MA  [facility]

## 2025-06-24 ENCOUNTER — NURSE TRIAGE (OUTPATIENT)
Age: 75
End: 2025-06-24

## 2025-06-24 ENCOUNTER — OFFICE VISIT (OUTPATIENT)
Dept: INTERNAL MEDICINE CLINIC | Facility: CLINIC | Age: 75
End: 2025-06-24
Payer: COMMERCIAL

## 2025-06-24 VITALS
DIASTOLIC BLOOD PRESSURE: 84 MMHG | OXYGEN SATURATION: 97 % | BODY MASS INDEX: 26.5 KG/M2 | HEIGHT: 60 IN | SYSTOLIC BLOOD PRESSURE: 162 MMHG | WEIGHT: 135 LBS | HEART RATE: 88 BPM | RESPIRATION RATE: 17 BRPM

## 2025-06-24 DIAGNOSIS — M79.602 LEFT ARM PAIN: Primary | ICD-10-CM

## 2025-06-24 PROCEDURE — 99213 OFFICE O/P EST LOW 20 MIN: CPT

## 2025-06-24 PROCEDURE — G2211 COMPLEX E/M VISIT ADD ON: HCPCS

## 2025-06-24 NOTE — PROGRESS NOTES
Name: Bety Engle      : 1950      MRN: [id number]  Encounter Provider: PEREZ Cervantes  Encounter Date: 2025   Encounter department: [facility] INTERNAL MEDICINE [place]  :  Assessment & Plan  Left arm pain  Patient presented a picture at her visit today of the original results of the failed IV attempts to the left arm.  Significantly ecchymotic and edematous at that time.  In office today it is still edematous, less than in the photo.  Ecchymosis improving.  Very painful to touch, she states it is like an electrical shock.  Patient has been using cold compresses initially like she was directed to, encouraged her to try warm compresses.  Pain may continue to improve but did inform the patient that there may be chronic residual tenderness to the area. No concern for phlebitis at this time, symptoms are tissue inflammation versus vascular.              History of Present Illness {?Quick Links Encounters * My Last Note * Last Note in Specialty * Snapshot * Since Last Visit * History :61897}  Bety is here today with complaints of pain in her arm.  She had an MRI completed on 6/15, they did had a difficult time getting the IV inserted for contrast. She had informed them when she arrived that she id a difficult IV stick and requested someone who is more skilled with doing so. The radiology technicians had tried and failed in the right AC and attempted the left AC. Patient states that they eventually called a nurse to perform the IV. After leaving she was informed to use cold compresses. Patient presents a picture from after the IV attempts. She notes a lot of improvement, but the pain when touching the site is very bothersome to her.      Review of Systems   Constitutional:  Negative for chills and fever.   HENT:  Negative for congestion, ear pain, rhinorrhea and sore throat.    Eyes:  Negative for pain and visual disturbance.   Respiratory:  Negative for cough, chest tightness and  shortness of breath.    Cardiovascular:  Negative for chest pain, palpitations and leg swelling.   Gastrointestinal:  Negative for abdominal pain, constipation, diarrhea, nausea and vomiting.   Musculoskeletal:  Positive for arthralgias. Negative for back pain.   Skin:  Positive for color change. Negative for rash.   Neurological:  Negative for dizziness, seizures, syncope and headaches.   All other systems reviewed and are negative.      Objective {?Quick Links Trend Vitals * Enter New Vitals * Results Review * Timeline (Adult) * Labs * Imaging * Cardiology * Procedures * Lung Cancer Screening * Surgical eConsent :48970}  /84 (BP Location: Right arm, Patient Position: Sitting, Cuff Size: Adult)   Pulse 88   Resp 17   Ht 5' (1.524 m)   Wt 61.2 kg (135 lb)   LMP  (LMP Unknown)   SpO2 97%   BMI 26.37 kg/m²      Physical Exam  Vitals and nursing note reviewed.   Constitutional:       General: She is not in acute distress.     Appearance: She is well-developed.     Cardiovascular:      Rate and Rhythm: Normal rate and regular rhythm.      Pulses: Normal pulses.      Heart sounds: Normal heart sounds. No murmur heard.  Pulmonary:      Effort: Pulmonary effort is normal. No respiratory distress.      Breath sounds: Normal breath sounds.   Abdominal:      General: Bowel sounds are normal.     Skin:     General: Skin is warm and dry.      Capillary Refill: Capillary refill takes less than 2 seconds.          Neurological:      Mental Status: She is alert and oriented to person, place, and time.     Psychiatric:         Mood and Affect: Mood normal.

## 2025-06-24 NOTE — TELEPHONE ENCOUNTER
"REASON FOR CONVERSATION: Arm Pain    SYMPTOMS: moderate to Severe pinching pain in left arm/left antecubital area. No redness, no warmth, no swelling    OTHER HEALTH INFORMATION: patient had MRI last week on 6/15/25. Patient stuck multiple times for IV. Pinching/burning pain started last Friday 6/20/25.    PROTOCOL DISPOSITION: See Within 3 Days in Office-appt made for today at 200pm, unable to come in tomorrow due to other appts.    CARE ADVICE PROVIDED: call back if sx worsen    PRACTICE FOLLOW-UP: N/A    Reason for Disposition   MODERATE pain (e.g., interferes with normal activities) and present > 3 days    Answer Assessment - Initial Assessment Questions  1. ONSET: \"When did the pain start?\"      Last Friday   2. LOCATION: \"Where is the pain located?\"      Left antecubital   3. PAIN: \"How bad is the pain?\" (Scale 0-10; or none, mild, moderate, severe)      Pinching pain that shoots, burning sensation  5. CAUSE: \"What do you think is causing the arm pain?\"      Had MRI last week on 6/15/25 and IV placed- now pinching pain     6. OTHER SYMPTOMS: \"Do you have any other symptoms?\" (e.g., n  neck pain, swelling, rash, fever, numbness, weakness)      denies     No redness, no warmth, no swelling    Protocols used: Arm Pain-Adult-[place]    "

## 2025-06-25 ENCOUNTER — TELEPHONE (OUTPATIENT)
Age: 75
End: 2025-06-25

## 2025-06-25 NOTE — TELEPHONE ENCOUNTER
Patient called and stated that she was in to see Violetta yesterday and was told to apply heat on her arm, patient is wanting to know if she means dry heat or wet heat.    Please advise

## 2025-07-23 ENCOUNTER — TELEPHONE (OUTPATIENT)
Age: 75
End: 2025-07-23

## 2025-07-23 NOTE — TELEPHONE ENCOUNTER
Patient called to verify if she needed to have any testing done for Dr. Wall. States she is scheduled for a procedure with ENT on 8/6/25.  Patient aware Dr. Wall ordered labs for her to complete at last visit and ENT has an EKG ordered for her to have done.      Patient was advised to call ENT to inquire whether she needs to have any other testing done prior to procedure and also if she needs to have a pre-op clearance appointment with Dr. Wlal prior.      Patient agreeable and will call ENT

## 2025-07-24 ENCOUNTER — OFFICE VISIT (OUTPATIENT)
Dept: LAB | Facility: HOSPITAL | Age: 75
End: 2025-07-24
Payer: COMMERCIAL

## 2025-07-24 DIAGNOSIS — Z01.818 PRE-OP TESTING: ICD-10-CM

## 2025-07-24 PROCEDURE — 93005 ELECTROCARDIOGRAM TRACING: CPT

## 2025-07-25 LAB
ATRIAL RATE: 81 BPM
P AXIS: 6 DEGREES
PR INTERVAL: 130 MS
QRS AXIS: 18 DEGREES
QRSD INTERVAL: 86 MS
QT INTERVAL: 364 MS
QTC INTERVAL: 423 MS
T WAVE AXIS: 22 DEGREES
VENTRICULAR RATE: 81 BPM

## 2025-07-25 PROCEDURE — 93010 ELECTROCARDIOGRAM REPORT: CPT | Performed by: INTERNAL MEDICINE

## 2025-08-06 ENCOUNTER — VBI (OUTPATIENT)
Dept: ADMINISTRATIVE | Facility: OTHER | Age: 75
End: 2025-08-06

## 2025-08-20 ENCOUNTER — ANESTHESIA EVENT (OUTPATIENT)
Dept: PERIOP | Facility: HOSPITAL | Age: 75
End: 2025-08-20
Payer: COMMERCIAL

## 2025-08-20 ENCOUNTER — HOSPITAL ENCOUNTER (OUTPATIENT)
Facility: HOSPITAL | Age: 75
Setting detail: OUTPATIENT SURGERY
Discharge: HOME | End: 2025-08-20
Attending: OTOLARYNGOLOGY | Admitting: OTOLARYNGOLOGY
Payer: COMMERCIAL

## 2025-08-20 ENCOUNTER — ANESTHESIA (OUTPATIENT)
Dept: PERIOP | Facility: HOSPITAL | Age: 75
End: 2025-08-20
Payer: COMMERCIAL

## 2025-08-20 VITALS
BODY MASS INDEX: 26.88 KG/M2 | DIASTOLIC BLOOD PRESSURE: 73 MMHG | SYSTOLIC BLOOD PRESSURE: 180 MMHG | RESPIRATION RATE: 18 BRPM | OXYGEN SATURATION: 98 % | WEIGHT: 136.91 LBS | HEART RATE: 66 BPM | TEMPERATURE: 97.5 F | HEIGHT: 60 IN

## 2025-08-20 DIAGNOSIS — H65.493 COME (CHRONIC OTITIS MEDIA WITH EFFUSION), BILATERAL: Primary | ICD-10-CM

## 2025-08-20 PROBLEM — H69.93 DYSFUNCTION OF BOTH EUSTACHIAN TUBES: Status: ACTIVE | Noted: 2025-08-20

## 2025-08-20 PROBLEM — H69.91 UNSPECIFIED EUSTACHIAN TUBE DISORDER, RIGHT EAR: Status: ACTIVE | Noted: 2025-08-20

## 2025-08-20 LAB
GLUCOSE SERPL-MCNC: 213 MG/DL (ref 65–140)
GLUCOSE SERPL-MCNC: 233 MG/DL (ref 65–140)

## 2025-08-20 PROCEDURE — 82948 REAGENT STRIP/BLOOD GLUCOSE: CPT

## 2025-08-20 PROCEDURE — C1726 CATH, BAL DIL, NON-VASCULAR: HCPCS | Performed by: OTOLARYNGOLOGY

## 2025-08-20 DEVICE — IMPLANTABLE DEVICE: Type: IMPLANTABLE DEVICE | Site: EAR | Status: FUNCTIONAL

## 2025-08-20 RX ORDER — PROPOFOL 10 MG/ML
INJECTION, EMULSION INTRAVENOUS AS NEEDED
Status: DISCONTINUED | OUTPATIENT
Start: 2025-08-20 | End: 2025-08-20

## 2025-08-20 RX ORDER — MAGNESIUM HYDROXIDE 1200 MG/15ML
LIQUID ORAL AS NEEDED
Status: DISCONTINUED | OUTPATIENT
Start: 2025-08-20 | End: 2025-08-20 | Stop reason: HOSPADM

## 2025-08-20 RX ORDER — ONDANSETRON 2 MG/ML
INJECTION INTRAMUSCULAR; INTRAVENOUS AS NEEDED
Status: DISCONTINUED | OUTPATIENT
Start: 2025-08-20 | End: 2025-08-20

## 2025-08-20 RX ORDER — OXYMETAZOLINE HYDROCHLORIDE 0.05 G/100ML
SPRAY NASAL AS NEEDED
Status: DISCONTINUED | OUTPATIENT
Start: 2025-08-20 | End: 2025-08-20 | Stop reason: HOSPADM

## 2025-08-20 RX ORDER — EPHEDRINE SULFATE 50 MG/ML
INJECTION INTRAVENOUS AS NEEDED
Status: DISCONTINUED | OUTPATIENT
Start: 2025-08-20 | End: 2025-08-20

## 2025-08-20 RX ORDER — FENTANYL CITRATE/PF 50 MCG/ML
25 SYRINGE (ML) INJECTION
Status: DISCONTINUED | OUTPATIENT
Start: 2025-08-20 | End: 2025-08-20 | Stop reason: HOSPADM

## 2025-08-20 RX ORDER — DEXAMETHASONE SODIUM PHOSPHATE 10 MG/ML
INJECTION, SOLUTION INTRAMUSCULAR; INTRAVENOUS AS NEEDED
Status: DISCONTINUED | OUTPATIENT
Start: 2025-08-20 | End: 2025-08-20

## 2025-08-20 RX ORDER — OFLOXACIN 3 MG/ML
SOLUTION/ DROPS OPHTHALMIC AS NEEDED
Status: DISCONTINUED | OUTPATIENT
Start: 2025-08-20 | End: 2025-08-20 | Stop reason: HOSPADM

## 2025-08-20 RX ORDER — PROMETHAZINE HYDROCHLORIDE 25 MG/ML
6.25 INJECTION, SOLUTION INTRAMUSCULAR; INTRAVENOUS ONCE AS NEEDED
Status: DISCONTINUED | OUTPATIENT
Start: 2025-08-20 | End: 2025-08-20 | Stop reason: HOSPADM

## 2025-08-20 RX ORDER — MIDAZOLAM HYDROCHLORIDE 2 MG/2ML
INJECTION, SOLUTION INTRAMUSCULAR; INTRAVENOUS AS NEEDED
Status: DISCONTINUED | OUTPATIENT
Start: 2025-08-20 | End: 2025-08-20

## 2025-08-20 RX ORDER — LIDOCAINE HYDROCHLORIDE 10 MG/ML
INJECTION, SOLUTION EPIDURAL; INFILTRATION; INTRACAUDAL; PERINEURAL AS NEEDED
Status: DISCONTINUED | OUTPATIENT
Start: 2025-08-20 | End: 2025-08-20

## 2025-08-20 RX ORDER — FENTANYL CITRATE 50 UG/ML
INJECTION, SOLUTION INTRAMUSCULAR; INTRAVENOUS AS NEEDED
Status: DISCONTINUED | OUTPATIENT
Start: 2025-08-20 | End: 2025-08-20

## 2025-08-20 RX ORDER — OFLOXACIN 3 MG/ML
5 SOLUTION AURICULAR (OTIC) 2 TIMES DAILY
Qty: 1.5 ML | Refills: 0 | Status: SHIPPED | OUTPATIENT
Start: 2025-08-20 | End: 2025-08-23

## 2025-08-20 RX ORDER — SODIUM CHLORIDE, SODIUM LACTATE, POTASSIUM CHLORIDE, CALCIUM CHLORIDE 600; 310; 30; 20 MG/100ML; MG/100ML; MG/100ML; MG/100ML
INJECTION, SOLUTION INTRAVENOUS CONTINUOUS PRN
Status: DISCONTINUED | OUTPATIENT
Start: 2025-08-20 | End: 2025-08-20

## 2025-08-20 RX ORDER — ACETAMINOPHEN 10 MG/ML
1000 INJECTION, SOLUTION INTRAVENOUS ONCE
Status: DISCONTINUED | OUTPATIENT
Start: 2025-08-20 | End: 2025-08-20

## 2025-08-20 RX ORDER — LABETALOL HYDROCHLORIDE 5 MG/ML
5 INJECTION, SOLUTION INTRAVENOUS
Status: DISCONTINUED | OUTPATIENT
Start: 2025-08-20 | End: 2025-08-20 | Stop reason: HOSPADM

## 2025-08-20 RX ADMIN — PROPOFOL 30 MG: 10 INJECTION, EMULSION INTRAVENOUS at 08:45

## 2025-08-20 RX ADMIN — FENTANYL CITRATE 25 MCG: 50 INJECTION INTRAMUSCULAR; INTRAVENOUS at 08:42

## 2025-08-20 RX ADMIN — PROPOFOL 20 MG: 10 INJECTION, EMULSION INTRAVENOUS at 09:20

## 2025-08-20 RX ADMIN — FENTANYL CITRATE 25 MCG: 50 INJECTION INTRAMUSCULAR; INTRAVENOUS at 08:52

## 2025-08-20 RX ADMIN — PROPOFOL 20 MG: 10 INJECTION, EMULSION INTRAVENOUS at 08:53

## 2025-08-20 RX ADMIN — LABETALOL HYDROCHLORIDE 5 MG: 5 INJECTION, SOLUTION INTRAVENOUS at 10:16

## 2025-08-20 RX ADMIN — EPHEDRINE SULFATE 5 MG: 50 INJECTION, SOLUTION INTRAVENOUS at 09:08

## 2025-08-20 RX ADMIN — MIDAZOLAM HYDROCHLORIDE 0.5 MG: 1 INJECTION, SOLUTION INTRAMUSCULAR; INTRAVENOUS at 08:28

## 2025-08-20 RX ADMIN — PROPOFOL 120 MG: 10 INJECTION, EMULSION INTRAVENOUS at 08:37

## 2025-08-20 RX ADMIN — LABETALOL HYDROCHLORIDE 5 MG: 5 INJECTION, SOLUTION INTRAVENOUS at 10:05

## 2025-08-20 RX ADMIN — SODIUM CHLORIDE, SODIUM LACTATE, POTASSIUM CHLORIDE, AND CALCIUM CHLORIDE: .6; .31; .03; .02 INJECTION, SOLUTION INTRAVENOUS at 08:15

## 2025-08-20 RX ADMIN — PROPOFOL 20 MG: 10 INJECTION, EMULSION INTRAVENOUS at 09:29

## 2025-08-20 RX ADMIN — LIDOCAINE HYDROCHLORIDE 50 MG: 10 INJECTION, SOLUTION EPIDURAL; INFILTRATION; INTRACAUDAL at 08:37

## 2025-08-20 RX ADMIN — PROPOFOL 30 MG: 10 INJECTION, EMULSION INTRAVENOUS at 09:00

## 2025-08-20 RX ADMIN — LABETALOL HYDROCHLORIDE 5 MG: 5 INJECTION, SOLUTION INTRAVENOUS at 09:55

## 2025-08-20 RX ADMIN — DEXAMETHASONE SODIUM PHOSPHATE 10 MG: 10 INJECTION, SOLUTION INTRAMUSCULAR; INTRAVENOUS at 08:37

## 2025-08-20 RX ADMIN — ONDANSETRON 4 MG: 2 INJECTION INTRAMUSCULAR; INTRAVENOUS at 08:49

## 2025-08-20 RX ADMIN — PROPOFOL 20 MG: 10 INJECTION, EMULSION INTRAVENOUS at 09:28

## 2025-08-20 RX ADMIN — MIDAZOLAM HYDROCHLORIDE 0.5 MG: 1 INJECTION, SOLUTION INTRAMUSCULAR; INTRAVENOUS at 08:36

## 2025-08-20 RX ADMIN — ACETAMINOPHEN 1000 MG: 10 INJECTION INTRAVENOUS at 10:14

## 2025-08-20 RX ADMIN — FENTANYL CITRATE 25 MCG: 50 INJECTION INTRAMUSCULAR; INTRAVENOUS at 08:45

## 2025-08-20 RX ADMIN — FENTANYL CITRATE 25 MCG: 50 INJECTION INTRAMUSCULAR; INTRAVENOUS at 09:00

## (undated) DEVICE — Device

## (undated) DEVICE — SPECIMEN CONTAINER STERILE PEEL PACK

## (undated) DEVICE — COVER STAND W23 IN REINFORCE PLASTIC

## (undated) DEVICE — SPONGE GAUZE 4 X 4 16 PLY STRL PLASTIC TRAY LF

## (undated) DEVICE — PACK PBDS NASAL RF

## (undated) DEVICE — DRAPE SHEET THREE QUARTER